# Patient Record
Sex: FEMALE | Race: WHITE | NOT HISPANIC OR LATINO | Employment: OTHER | URBAN - NONMETROPOLITAN AREA
[De-identification: names, ages, dates, MRNs, and addresses within clinical notes are randomized per-mention and may not be internally consistent; named-entity substitution may affect disease eponyms.]

---

## 2017-01-09 RX ORDER — TAMOXIFEN CITRATE 10 MG/1
10 TABLET ORAL 2 TIMES DAILY
Qty: 180 TABLET | Refills: 3 | Status: SHIPPED | OUTPATIENT
Start: 2017-01-09 | End: 2017-01-16 | Stop reason: SDUPTHER

## 2017-01-16 RX ORDER — TAMOXIFEN CITRATE 10 MG/1
10 TABLET ORAL 2 TIMES DAILY
Qty: 180 TABLET | Refills: 3 | Status: SHIPPED | OUTPATIENT
Start: 2017-01-16 | End: 2017-05-15 | Stop reason: SDUPTHER

## 2017-01-16 NOTE — TELEPHONE ENCOUNTER
Refill request from [facility] to fill tamoxifen.  Order from Dr. Copeland to fill script.

## 2017-03-05 ENCOUNTER — HOSPITAL ENCOUNTER (INPATIENT)
Facility: HOSPITAL | Age: 77
LOS: 5 days | Discharge: HOME HEALTH | End: 2017-03-10
Attending: EMERGENCY MEDICINE | Admitting: FAMILY MEDICINE

## 2017-03-05 ENCOUNTER — APPOINTMENT (OUTPATIENT)
Dept: GENERAL RADIOLOGY | Facility: HOSPITAL | Age: 77
End: 2017-03-05

## 2017-03-05 ENCOUNTER — APPOINTMENT (OUTPATIENT)
Dept: CT IMAGING | Facility: HOSPITAL | Age: 77
End: 2017-03-05

## 2017-03-05 DIAGNOSIS — N28.1 RENAL CYST: ICD-10-CM

## 2017-03-05 DIAGNOSIS — K52.9 ENTERITIS: ICD-10-CM

## 2017-03-05 DIAGNOSIS — J18.9 PNEUMONIA OF BOTH LOWER LOBES DUE TO INFECTIOUS ORGANISM: ICD-10-CM

## 2017-03-05 DIAGNOSIS — A41.9 SEPSIS, DUE TO UNSPECIFIED ORGANISM: Primary | ICD-10-CM

## 2017-03-05 DIAGNOSIS — A41.51 SEPSIS DUE TO ESCHERICHIA COLI (HCC): ICD-10-CM

## 2017-03-05 DIAGNOSIS — R74.01 TRANSAMINITIS: ICD-10-CM

## 2017-03-05 DIAGNOSIS — K52.9 ACUTE COLITIS: ICD-10-CM

## 2017-03-05 LAB
ALBUMIN SERPL-MCNC: 3.6 G/DL (ref 3.5–5)
ALBUMIN/GLOB SERPL: 1.4 G/DL (ref 1.1–2.5)
ALP SERPL-CCNC: 76 U/L (ref 24–120)
ALT SERPL W P-5'-P-CCNC: 269 U/L (ref 0–54)
AMYLASE SERPL-CCNC: 71 U/L (ref 30–110)
ANION GAP SERPL CALCULATED.3IONS-SCNC: 10 MMOL/L (ref 4–13)
ARTERIAL PATENCY WRIST A: ABNORMAL
AST SERPL-CCNC: 468 U/L (ref 7–45)
ATMOSPHERIC PRESS: ABNORMAL MMHG
BACTERIA UR QL AUTO: ABNORMAL /HPF
BASE EXCESS BLDA CALC-SCNC: -1.6 MMOL/L (ref -2–2)
BASOPHILS # BLD AUTO: 0.02 10*3/MM3 (ref 0–0.2)
BASOPHILS NFR BLD AUTO: 0.1 % (ref 0–2)
BDY SITE: ABNORMAL
BILIRUB SERPL-MCNC: 4.4 MG/DL (ref 0.1–1)
BILIRUB UR QL STRIP: ABNORMAL
BILIRUB UR QL STRIP: NEGATIVE
BUN BLD-MCNC: 20 MG/DL (ref 5–21)
BUN/CREAT SERPL: 32.3 (ref 7–25)
CALCIUM SPEC-SCNC: 9.4 MG/DL (ref 8.4–10.4)
CHLORIDE SERPL-SCNC: 99 MMOL/L (ref 98–110)
CLARITY UR: CLEAR
CLARITY UR: CLEAR
CO2 SERPL-SCNC: 25 MMOL/L (ref 24–31)
COLOR UR: ABNORMAL
COLOR UR: ABNORMAL
CREAT BLD-MCNC: 0.62 MG/DL (ref 0.5–1.4)
D-LACTATE SERPL-SCNC: 2.8 MMOL/L (ref 0.5–2)
D-LACTATE SERPL-SCNC: 3.7 MMOL/L (ref 0.5–2)
DEPRECATED RDW RBC AUTO: 41.2 FL (ref 40–54)
EOSINOPHIL # BLD AUTO: 0.01 10*3/MM3 (ref 0–0.7)
EOSINOPHIL NFR BLD AUTO: 0.1 % (ref 0–4)
ERYTHROCYTE [DISTWIDTH] IN BLOOD BY AUTOMATED COUNT: 12.2 % (ref 12–15)
GFR SERPL CREATININE-BSD FRML MDRD: 93 ML/MIN/1.73
GLOBULIN UR ELPH-MCNC: 2.6 GM/DL
GLUCOSE BLD-MCNC: 196 MG/DL (ref 70–100)
GLUCOSE UR STRIP-MCNC: ABNORMAL MG/DL
GLUCOSE UR STRIP-MCNC: NEGATIVE MG/DL
HCO3 BLDA-SCNC: 22.1 MMOL/L (ref 22–26)
HCT VFR BLD AUTO: 37.8 % (ref 37–47)
HGB BLD-MCNC: 13.4 G/DL (ref 12–16)
HGB UR QL STRIP.AUTO: NEGATIVE
HGB UR QL STRIP.AUTO: NEGATIVE
HOLD SPECIMEN: NORMAL
HOLD SPECIMEN: NORMAL
HYALINE CASTS UR QL AUTO: ABNORMAL /LPF
IMM GRANULOCYTES # BLD: 0.04 10*3/MM3 (ref 0–0.03)
IMM GRANULOCYTES NFR BLD: 0.3 % (ref 0–5)
KETONES UR QL STRIP: ABNORMAL
KETONES UR QL STRIP: NEGATIVE
LEUKOCYTE ESTERASE UR QL STRIP.AUTO: ABNORMAL
LEUKOCYTE ESTERASE UR QL STRIP.AUTO: NEGATIVE
LIPASE SERPL-CCNC: 113 U/L (ref 23–203)
LYMPHOCYTES # BLD AUTO: 0.35 10*3/MM3 (ref 0.72–4.86)
LYMPHOCYTES NFR BLD AUTO: 2.3 % (ref 15–45)
MAGNESIUM SERPL-MCNC: 1.1 MG/DL (ref 1.4–2.2)
MCH RBC QN AUTO: 32.6 PG (ref 28–32)
MCHC RBC AUTO-ENTMCNC: 35.4 G/DL (ref 33–36)
MCV RBC AUTO: 92 FL (ref 82–98)
MODALITY: ABNORMAL
MONOCYTES # BLD AUTO: 0.41 10*3/MM3 (ref 0.19–1.3)
MONOCYTES NFR BLD AUTO: 2.7 % (ref 4–12)
NEUTROPHILS # BLD AUTO: 14.57 10*3/MM3 (ref 1.87–8.4)
NEUTROPHILS NFR BLD AUTO: 94.5 % (ref 39–78)
NITRITE UR QL STRIP: NEGATIVE
NITRITE UR QL STRIP: NEGATIVE
PCO2 BLDA: 34.4 MM HG (ref 35–45)
PH BLDA: 7.42 PH UNITS (ref 7.35–7.45)
PH UR STRIP.AUTO: 6 [PH] (ref 5–8)
PH UR STRIP.AUTO: 6.5 [PH] (ref 5–8)
PLATELET # BLD AUTO: 127 10*3/MM3 (ref 130–400)
PMV BLD AUTO: 10.6 FL (ref 6–12)
PO2 BLDA: 64.4 MM HG (ref 80–100)
POTASSIUM BLD-SCNC: 3 MMOL/L (ref 3.5–5.3)
PROCALCITONIN SERPL-MCNC: 4.58 NG/ML
PROT SERPL-MCNC: 6.2 G/DL (ref 6.3–8.7)
PROT UR QL STRIP: NEGATIVE
PROT UR QL STRIP: NEGATIVE
RBC # BLD AUTO: 4.11 10*6/MM3 (ref 4.2–5.4)
RBC # UR: ABNORMAL /HPF
REF LAB TEST METHOD: ABNORMAL
SAO2 % BLDCOA: 93.2 % (ref 94–100)
SAO2 % BLDCOA: 93.2 % (ref 94–100)
SODIUM BLD-SCNC: 134 MMOL/L (ref 135–145)
SP GR UR STRIP: 1.02 (ref 1–1.03)
SP GR UR STRIP: 1.03 (ref 1–1.03)
SQUAMOUS #/AREA URNS HPF: ABNORMAL /HPF
TROPONIN I SERPL-MCNC: 0 NG/ML (ref 0–0.07)
UROBILINOGEN UR QL STRIP: ABNORMAL
UROBILINOGEN UR QL STRIP: ABNORMAL
WBC NRBC COR # BLD: 15.4 10*3/MM3 (ref 4.8–10.8)
WBC UR QL AUTO: ABNORMAL /HPF
WHOLE BLOOD HOLD SPECIMEN: NORMAL
WHOLE BLOOD HOLD SPECIMEN: NORMAL

## 2017-03-05 PROCEDURE — 71010 HC CHEST PA OR AP: CPT

## 2017-03-05 PROCEDURE — 94640 AIRWAY INHALATION TREATMENT: CPT

## 2017-03-05 PROCEDURE — 80053 COMPREHEN METABOLIC PANEL: CPT | Performed by: EMERGENCY MEDICINE

## 2017-03-05 PROCEDURE — 84484 ASSAY OF TROPONIN QUANT: CPT

## 2017-03-05 PROCEDURE — 0 IOPAMIDOL 61 % SOLUTION: Performed by: EMERGENCY MEDICINE

## 2017-03-05 PROCEDURE — 87150 DNA/RNA AMPLIFIED PROBE: CPT | Performed by: EMERGENCY MEDICINE

## 2017-03-05 PROCEDURE — 85025 COMPLETE CBC W/AUTO DIFF WBC: CPT | Performed by: EMERGENCY MEDICINE

## 2017-03-05 PROCEDURE — 87040 BLOOD CULTURE FOR BACTERIA: CPT | Performed by: EMERGENCY MEDICINE

## 2017-03-05 PROCEDURE — 87186 SC STD MICRODIL/AGAR DIL: CPT | Performed by: EMERGENCY MEDICINE

## 2017-03-05 PROCEDURE — 36600 WITHDRAWAL OF ARTERIAL BLOOD: CPT

## 2017-03-05 PROCEDURE — 81001 URINALYSIS AUTO W/SCOPE: CPT | Performed by: EMERGENCY MEDICINE

## 2017-03-05 PROCEDURE — 25010000002 ENOXAPARIN PER 10 MG: Performed by: FAMILY MEDICINE

## 2017-03-05 PROCEDURE — 93005 ELECTROCARDIOGRAM TRACING: CPT | Performed by: EMERGENCY MEDICINE

## 2017-03-05 PROCEDURE — 94760 N-INVAS EAR/PLS OXIMETRY 1: CPT

## 2017-03-05 PROCEDURE — 74177 CT ABD & PELVIS W/CONTRAST: CPT

## 2017-03-05 PROCEDURE — 83690 ASSAY OF LIPASE: CPT | Performed by: EMERGENCY MEDICINE

## 2017-03-05 PROCEDURE — 82150 ASSAY OF AMYLASE: CPT | Performed by: EMERGENCY MEDICINE

## 2017-03-05 PROCEDURE — 84145 PROCALCITONIN (PCT): CPT | Performed by: EMERGENCY MEDICINE

## 2017-03-05 PROCEDURE — 83735 ASSAY OF MAGNESIUM: CPT | Performed by: EMERGENCY MEDICINE

## 2017-03-05 PROCEDURE — 25010000002 POTASSIUM CHLORIDE PER 2 MEQ: Performed by: EMERGENCY MEDICINE

## 2017-03-05 PROCEDURE — 87077 CULTURE AEROBIC IDENTIFY: CPT | Performed by: EMERGENCY MEDICINE

## 2017-03-05 PROCEDURE — 99285 EMERGENCY DEPT VISIT HI MDM: CPT

## 2017-03-05 PROCEDURE — 81003 URINALYSIS AUTO W/O SCOPE: CPT | Performed by: EMERGENCY MEDICINE

## 2017-03-05 PROCEDURE — 94799 UNLISTED PULMONARY SVC/PX: CPT

## 2017-03-05 PROCEDURE — 25010000002 ONDANSETRON PER 1 MG: Performed by: EMERGENCY MEDICINE

## 2017-03-05 PROCEDURE — 82803 BLOOD GASES ANY COMBINATION: CPT

## 2017-03-05 PROCEDURE — P9612 CATHETERIZE FOR URINE SPEC: HCPCS

## 2017-03-05 PROCEDURE — 83605 ASSAY OF LACTIC ACID: CPT | Performed by: EMERGENCY MEDICINE

## 2017-03-05 PROCEDURE — 87086 URINE CULTURE/COLONY COUNT: CPT | Performed by: EMERGENCY MEDICINE

## 2017-03-05 PROCEDURE — 93010 ELECTROCARDIOGRAM REPORT: CPT | Performed by: INTERNAL MEDICINE

## 2017-03-05 RX ORDER — POTASSIUM CHLORIDE 14.9 MG/ML
20 INJECTION INTRAVENOUS ONCE
Status: COMPLETED | OUTPATIENT
Start: 2017-03-05 | End: 2017-03-05

## 2017-03-05 RX ORDER — ONDANSETRON 2 MG/ML
4 INJECTION INTRAMUSCULAR; INTRAVENOUS ONCE
Status: COMPLETED | OUTPATIENT
Start: 2017-03-05 | End: 2017-03-05

## 2017-03-05 RX ORDER — BUDESONIDE 0.5 MG/2ML
0.5 INHALANT ORAL
Status: DISCONTINUED | OUTPATIENT
Start: 2017-03-05 | End: 2017-03-10 | Stop reason: HOSPADM

## 2017-03-05 RX ORDER — METRONIDAZOLE 500 MG/1
500 TABLET ORAL EVERY 8 HOURS SCHEDULED
Status: DISCONTINUED | OUTPATIENT
Start: 2017-03-05 | End: 2017-03-09

## 2017-03-05 RX ORDER — SODIUM CHLORIDE 9 MG/ML
1000 INJECTION, SOLUTION INTRAVENOUS ONCE
Status: COMPLETED | OUTPATIENT
Start: 2017-03-05 | End: 2017-03-05

## 2017-03-05 RX ORDER — AZELASTINE 1 MG/ML
2 SPRAY, METERED NASAL 2 TIMES DAILY
COMMUNITY
End: 2017-11-02

## 2017-03-05 RX ORDER — MONTELUKAST SODIUM 10 MG/1
10 TABLET ORAL NIGHTLY
Status: DISCONTINUED | OUTPATIENT
Start: 2017-03-05 | End: 2017-03-10 | Stop reason: HOSPADM

## 2017-03-05 RX ORDER — FLUTICASONE PROPIONATE 50 MCG
2 SPRAY, SUSPENSION (ML) NASAL DAILY
COMMUNITY
End: 2018-07-09

## 2017-03-05 RX ORDER — FLUTICASONE PROPIONATE 50 MCG
2 SPRAY, SUSPENSION (ML) NASAL DAILY
Status: DISCONTINUED | OUTPATIENT
Start: 2017-03-05 | End: 2017-03-10 | Stop reason: HOSPADM

## 2017-03-05 RX ORDER — MONTELUKAST SODIUM 10 MG/1
10 TABLET ORAL DAILY
Status: DISCONTINUED | OUTPATIENT
Start: 2017-03-05 | End: 2017-03-05

## 2017-03-05 RX ORDER — SODIUM CHLORIDE 0.9 % (FLUSH) 0.9 %
10 SYRINGE (ML) INJECTION AS NEEDED
Status: DISCONTINUED | OUTPATIENT
Start: 2017-03-05 | End: 2017-03-10 | Stop reason: HOSPADM

## 2017-03-05 RX ORDER — ONDANSETRON 2 MG/ML
4 INJECTION INTRAMUSCULAR; INTRAVENOUS EVERY 6 HOURS PRN
Status: DISCONTINUED | OUTPATIENT
Start: 2017-03-05 | End: 2017-03-10 | Stop reason: HOSPADM

## 2017-03-05 RX ORDER — ACETAMINOPHEN 325 MG/1
650 TABLET ORAL EVERY 4 HOURS PRN
Status: DISCONTINUED | OUTPATIENT
Start: 2017-03-05 | End: 2017-03-10 | Stop reason: HOSPADM

## 2017-03-05 RX ORDER — TAMOXIFEN CITRATE 10 MG/1
10 TABLET ORAL 2 TIMES DAILY
Status: DISCONTINUED | OUTPATIENT
Start: 2017-03-05 | End: 2017-03-10 | Stop reason: HOSPADM

## 2017-03-05 RX ORDER — SODIUM CHLORIDE 0.9 % (FLUSH) 0.9 %
1-10 SYRINGE (ML) INJECTION AS NEEDED
Status: DISCONTINUED | OUTPATIENT
Start: 2017-03-05 | End: 2017-03-10 | Stop reason: HOSPADM

## 2017-03-05 RX ORDER — SODIUM CHLORIDE 9 MG/ML
100 INJECTION, SOLUTION INTRAVENOUS CONTINUOUS
Status: DISCONTINUED | OUTPATIENT
Start: 2017-03-05 | End: 2017-03-07

## 2017-03-05 RX ORDER — ASPIRIN 81 MG/1
81 TABLET ORAL DAILY
Status: DISCONTINUED | OUTPATIENT
Start: 2017-03-05 | End: 2017-03-10 | Stop reason: HOSPADM

## 2017-03-05 RX ADMIN — MONTELUKAST SODIUM 10 MG: 10 TABLET ORAL at 21:44

## 2017-03-05 RX ADMIN — ASPIRIN 81 MG: 81 TABLET ORAL at 15:39

## 2017-03-05 RX ADMIN — TAMOXIFEN CITRATE 10 MG: 10 TABLET ORAL at 13:50

## 2017-03-05 RX ADMIN — METRONIDAZOLE 500 MG: 500 TABLET ORAL at 23:13

## 2017-03-05 RX ADMIN — SODIUM CHLORIDE 1000 ML: 9 INJECTION, SOLUTION INTRAVENOUS at 09:00

## 2017-03-05 RX ADMIN — ENOXAPARIN SODIUM 30 MG: 100 INJECTION SUBCUTANEOUS at 13:49

## 2017-03-05 RX ADMIN — ERTAPENEM SODIUM 1 G: 1 INJECTION, POWDER, LYOPHILIZED, FOR SOLUTION INTRAMUSCULAR; INTRAVENOUS at 14:01

## 2017-03-05 RX ADMIN — FLUTICASONE PROPIONATE 2 SPRAY: 50 SPRAY, METERED NASAL at 13:48

## 2017-03-05 RX ADMIN — SODIUM CHLORIDE 1000 ML: 9 INJECTION, SOLUTION INTRAVENOUS at 07:16

## 2017-03-05 RX ADMIN — ACETAMINOPHEN 650 MG: 325 TABLET ORAL at 15:42

## 2017-03-05 RX ADMIN — BUDESONIDE 0.5 MG: 0.5 INHALANT RESPIRATORY (INHALATION) at 20:07

## 2017-03-05 RX ADMIN — AZTREONAM 2 G: 2 INJECTION, POWDER, FOR SOLUTION INTRAMUSCULAR; INTRAVENOUS at 09:04

## 2017-03-05 RX ADMIN — ONDANSETRON 4 MG: 2 INJECTION INTRAMUSCULAR; INTRAVENOUS at 07:16

## 2017-03-05 RX ADMIN — POTASSIUM CHLORIDE 20 MEQ: 200 INJECTION, SOLUTION INTRAVENOUS at 12:05

## 2017-03-05 RX ADMIN — SODIUM CHLORIDE 125 ML/HR: 9 INJECTION, SOLUTION INTRAVENOUS at 12:05

## 2017-03-05 RX ADMIN — IOPAMIDOL 100 ML: 612 INJECTION, SOLUTION INTRAVENOUS at 07:51

## 2017-03-05 RX ADMIN — VANCOMYCIN HYDROCHLORIDE 1000 MG: 1 INJECTION, POWDER, LYOPHILIZED, FOR SOLUTION INTRAVENOUS at 10:15

## 2017-03-05 NOTE — ED PROVIDER NOTES
Subjective   Patient is a 76 y.o. female presenting with abdominal pain.   Abdominal Pain   Pain location:  Epigastric  Pain quality: aching, fullness and pressure    Pain radiates to:  Does not radiate  Pain severity:  Moderate  Onset quality:  Gradual  Timing:  Constant  Progression:  Worsening  Chronicity:  New  Context: not alcohol use, not awakening from sleep, not diet changes, not eating, not medication withdrawal, not previous surgeries, not recent illness, not recent travel, not retching, not sick contacts and not trauma    Relieved by:  Nothing  Worsened by:  Nothing  Ineffective treatments:  None tried  Associated symptoms: anorexia, belching, diarrhea, nausea and vomiting    Associated symptoms: no chest pain, no chills, no constipation, no cough, no fever, no flatus, no hematemesis, no hematochezia, no hematuria, no melena, no sore throat and no vaginal bleeding    Risk factors: no alcohol abuse, has not had multiple surgeries, no NSAID use and not obese        Review of Systems   Constitutional: Negative.  Negative for chills and fever.   HENT: Negative.  Negative for sore throat.    Eyes: Negative.    Respiratory: Negative.  Negative for cough.    Cardiovascular: Negative.  Negative for chest pain.   Gastrointestinal: Positive for abdominal pain, anorexia, diarrhea, nausea and vomiting. Negative for constipation, flatus, hematemesis, hematochezia and melena.   Endocrine: Negative.    Genitourinary: Negative.  Negative for hematuria and vaginal bleeding.   Skin: Negative.    Neurological: Negative.    Hematological: Negative.    All other systems reviewed and are negative.      Past Medical History   Diagnosis Date   • Anemia, unspecified    • Bone/cartilage disorder    • Bronchitis    • Chronic obstructive asthma with status asthmaticus    • History of bone density study      DR. 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 breast      RIGHT SIDED BREAST CANCER  WITH LUMPECTOMY   • Osteoporosis    • Pure hypercholesterolemia    • Type 2 diabetes mellitus without complications        Allergies   Allergen Reactions   • Septra [Sulfamethoxazole-Trimethoprim] Hives   • Keflex [Cephalexin] Hives and GI Intolerance   • Ceftin [Cefuroxime] Hives and GI Intolerance   • Ciprofloxacin Hives and Rash       Past Surgical History   Procedure Laterality Date   • Breast biopsy Right 11/11/2013   • Breast lumpectomy Right 12/03/2013   • Breast biopsy Right 1972      with Benign findings   • Cholecystectomy     • Tubal abdominal ligation     • Skin biopsy Right 11/16/2013     FOOT AND LEG; BENIGN FINDINGS   • Foot surgery  09/2014      Dr. Sherri Saha in [facility]   • Colonoscopy  2010      Dr. Gardiner. facility used [facility]   • Mammo bilateral  08/19/2014   • Pap smear     • Breast lumpectomy       RIGHT       Family History   Problem Relation Age of Onset   • Stroke Brother    • No Known Problems Daughter    • No Known Problems Son    • Other Brother      hx of many comorbidities   • Down syndrome Brother    • Stroke Mother    • Pneumonia Father    • Stroke Father        Social History     Social History   • Marital status:      Spouse name: N/A   • Number of children: N/A   • Years of education: N/A     Social History Main Topics   • Smoking status: Never Smoker   • Smokeless tobacco: None   • Alcohol use No   • Drug use: No   • Sexual activity: Not Asked     Other Topics Concern   • None     Social History Narrative   • None           Objective   Physical Exam   Constitutional: She is oriented to person, place, and time. She appears well-developed and well-nourished.  Non-toxic appearance.   HENT:   Head: Normocephalic and atraumatic.   Mouth/Throat: Oropharynx is clear and moist.   Eyes: Conjunctivae are normal. Pupils are equal, round, and reactive to light.   Neck: Normal range of motion. Neck supple. No hepatojugular reflux and no JVD present.   Cardiovascular:  Normal rate, regular rhythm, normal heart sounds and intact distal pulses.  PMI is not displaced.  Exam reveals no decreased pulses.    No murmur heard.  Pulmonary/Chest: Effort normal and breath sounds normal. No accessory muscle usage. No apnea. No respiratory distress. She has no decreased breath sounds. She has no wheezes.   Abdominal: Normal appearance, normal aorta and bowel sounds are normal. She exhibits no shifting dullness, no distension, no fluid wave, no abdominal bruit, no ascites, no pulsatile midline mass and no mass. There is no tenderness. There is no guarding.   Musculoskeletal: Normal range of motion.   Neurological: She is alert and oriented to person, place, and time. She has normal strength and normal reflexes. No cranial nerve deficit. GCS eye subscore is 4. GCS verbal subscore is 5. GCS motor subscore is 6.   Skin: Skin is warm and dry.   Psychiatric: She has a normal mood and affect. Her behavior is normal.   Nursing note and vitals reviewed.      Procedures         ED Course  ED Course   Value Comment By Time    TACHYCARDIA Robert Guthrie MD 03/05 0826   Glucose: (!) 196 (Reviewed) Robert Guthrie MD 03/05 0925   ALT (SGPT): (!) 269 (Reviewed) Robert Guthrie MD 03/05 0925   AST (SGOT): (!) 468 (Reviewed) Robert Guthrie MD 03/05 0925   WBC: (!) 15.40 (Reviewed) Robert Guthrie MD 03/05 0925   Color, UA: (!) Dark Yellow (Reviewed) Robert Guthrie MD 03/05 0925   pCO2, Arterial: (!) 34.4 (Reviewed) Robert Guthrie MD 03/05 0925   pO2, Arterial: (!) 64.4 (Reviewed) Robert Guthrie MD 03/05 0926    Patient is a do not discomfort and shortness of breath lactic acid 3.7 was given fluid boluses IV antibiotics and Robert Guthrie MD 03/05 0927    mild small bowel wall thickening is not excluded. Mild enteritis  considered. Correlate with patient presentation. No dilated bowel loops  or evidence of obstruction or definite acute bowel pathology otherwise.  2. Right nephrogenic cyst.  This report has been reviewed  with this patient as well as admitting physician and they need to follow up on the nephrogenic cyst has been conveyed Robert Guthrie MD 03/05 0927    I think the patient has got pneumonia will be admitted to the hospital hospitalist Dr. Valles informed Robert Guthrie MD 03/05 0928                  [facility]      Final diagnoses:   Sepsis, due to unspecified organism   Pneumonia of both lower lobes due to infectious organism   Transaminitis   Renal cyst   Enteritis            Robert Guthrie MD  03/05/17 3041

## 2017-03-05 NOTE — H&P
[facility]  HISTORY AND PHYSICAL    Date of Admission: 3/5/2017  Primary Care Physician: Lorenzo Vogel MD    Subjective     Chief Complaint: abd pain, diarrhea, vomiting    History of Present Illness  She is a 76-year-old white female who states that she had a sudden onset of mid epigastric discomfort that began the evening prior to presentation.  She states a few hours after that she began having diarrhea and had about 3 episodes of diarrhea and 1 episode of vomiting followed by dry heaving.  Abdominal discomfort began to improve and she had no further episodes of diarrhea however she developed chills and rigors.  His is what prompted her to present to the ER.  She had no sick contacts.  She did not have any new food intake and did not go out to eat.  She has not had recent antibiotic use.  She denied symptoms of shortness of breath, cough, chest pain.  She states that she did have some mild bilateral lower rib discomfort the evening prior.  Currently she has no abdominal discomfort and has tolerated clear liquids in her hospital room.  In the ER patient had a CT scan with IV contrast however no oral contrast commented on difficult to tell but possibly a mild enteritis.  She had a chest x-ray commented on mild bilateral opacities concerning for atelectasis versus pneumonia.  She had an elevated white count and elevated lactic acid and mildly low blood pressure therefore admission was requested for concern of sepsis possibly related to pneumonia.        Review of Systems     Otherwise complete ROS reviewed and negative except as mentioned in the HPI.      Past Medical History:   Past Medical History   Diagnosis Date   • Anemia, unspecified    • Bone/cartilage disorder    • Bronchitis    • Chronic obstructive asthma with status asthmaticus    • History of bone density study      DR. 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ri [place] [facility] [place]   • Colonoscopy  2010      Dr. Gardiner. facility used [facility]   • Mammo bilateral  08/19/2014   • Pap smear     • Breast lumpectomy       RIGHT       Social History:  reports that she has never smoked. She does not have any smokeless tobacco history on file. She reports that she does not drink alcohol or use illicit drugs.    Family History: family history includes Down syndrome in her brother; No Known Problems in her daughter and son; Other in her brother; Pneumonia in her father; Stroke in her brother, father, and mother.       Allergies:  Allergies   Allergen Reactions   • Septra [Sulfamethoxazole-Trimethoprim] Hives   • Keflex [Cephalexin] Hives and GI Intolerance   • Ceftin [Cefuroxime] Hives and GI Intolerance   • Ciprofloxacin Hives and Rash       Medications:  Prior to Admission medications    Medication Sig Start Date End Date Taking? Authorizing Provider   aspirin 81 MG EC tablet Take 81 mg by mouth Daily.   Yes Historical Provider, MD   azelastine (ASTELIN) 0.1 % nasal spray 2 sprays into each nostril 2 (Two) Times a Day. Use in each nostril as directed   Yes Historical Provider, MD   beclomethasone (QVAR) 80 MCG/ACT inhaler Inhale 1 puff 2 (Two) Times a Day.   Yes Historical Provider, MD   esomeprazole (nexIUM) 40 MG capsule Take 40 mg by mouth Daily.   Yes Historical Provider, MD   fluticasone (FLONASE) 50 MCG/ACT nasal spray 2 sprays into each nostril  "Daily.   Yes Historical Provider, MD   levocetirizine (XYZAL) 5 MG tablet Take 5 mg by mouth Daily.   Yes Historical Provider, MD   metFORMIN (GLUCOPHAGE) 500 MG tablet Take 500 mg by mouth 2 (Two) Times a Day.   Yes Historical Provider, MD   montelukast (SINGULAIR) 10 MG tablet Take 10 mg by mouth Daily.   Yes Historical Provider, MD   rosuvastatin (CRESTOR) 10 MG tablet Take 10 mg by mouth Daily.   Yes Historical Provider, MD   tamoxifen (NOLVADEX) 10 MG tablet Take 1 tablet by mouth 2 (Two) Times a Day. 1/16/17  Yes Robert Copeland MD   triamterene-hydrochlorothiazide (DYAZIDE) 37.5-25 MG per capsule Take 1 capsule by mouth Daily.   Yes Historical Provider, MD   Azelastine-Fluticasone (DYMISTA) 137-50 MCG/ACT suspension into each nostril. as directed  3/5/17  Historical Provider, MD   budesonide (PULMICORT) 180 MCG/ACT inhaler as directed  3/5/17  Historical Provider, MD   ibandronate (BONIVA) 150 MG tablet as directed  3/5/17  Historical Provider, MD       Objective     Vital Signs:   Visit Vitals   • /52 (BP Location: Left arm, Patient Position: Lying)   • Pulse 91   • Temp 98.1 °F (36.7 °C) (Oral)   • Resp 18   • Ht 63\" (160 cm)   • Wt 149 lb (67.6 kg)   • SpO2 97%   • BMI 26.39 kg/m2     Physical Exam   Constitutional: She is oriented to person, place, and time. She appears well-developed and well-nourished.   HENT:   Head: Normocephalic and atraumatic.   Eyes: Conjunctivae and EOM are normal. Pupils are equal, round, and reactive to light.   Neck: Neck supple. No JVD present. No thyromegaly present.   Cardiovascular: Normal rate, regular rhythm, normal heart sounds and intact distal pulses.  Exam reveals no gallop and no friction rub.    No murmur heard.  Pulmonary/Chest: Effort normal and breath sounds normal. No respiratory distress. She has no wheezes. She has no rales. She exhibits no tenderness.   Abdominal: Soft. Bowel sounds are normal. She exhibits no distension. There is tenderness (enderness " with palpation more so on the left). There is no rebound and no guarding.   Musculoskeletal: Normal range of motion. She exhibits no edema, tenderness or deformity.   Lymphadenopathy:     She has no cervical adenopathy.   Neurological: She is alert and oriented to person, place, and time. She displays normal reflexes. No cranial nerve deficit. She exhibits normal muscle tone.   Skin: Skin is warm and dry. No rash noted.   Psychiatric: She has a normal mood and affect. Her behavior is normal. Judgment and thought content normal.           Results Reviewed:  Lab Results (last 24 hours)     Procedure Component Value Units Date/Time    Comprehensive Metabolic Panel [[id number]]  (Abnormal) Collected:  03/05/17 0635    Specimen:  Blood from Arm, Left Updated:  03/05/17 0713     Glucose 196 (H) mg/dL      BUN 20 mg/dL      Creatinine 0.62 mg/dL      Sodium 134 (L) mmol/L      Potassium 3.0 (L) mmol/L      Chloride 99 mmol/L      CO2 25.0 mmol/L      Calcium 9.4 mg/dL      Total Protein 6.2 (L) g/dL      Albumin 3.60 g/dL      ALT (SGPT) 269 (H) U/L      AST (SGOT) 468 (H) U/L      Alkaline Phosphatase 76 U/L      Total Bilirubin 4.4 (H) mg/dL      eGFR Non African Amer 93 mL/min/1.73      Globulin 2.6 gm/dL      A/G Ratio 1.4 g/dL      BUN/Creatinine Ratio 32.3 (H)      Anion Gap 10.0 mmol/L     Narrative:       The MDRD GFR formula is only valid for adults with stable renal function between ages 18 and 70.    Lipase [[id number]]  (Normal) Collected:  03/05/17 0635    Specimen:  Blood from Arm, Left Updated:  03/05/17 0713     Lipase 113 U/L     Amylase [[id number]]  (Normal) Collected:  03/05/17 0635    Specimen:  Blood from Arm, Left Updated:  03/05/17 0713     Amylase 71 U/L     POC Troponin, Rapid [[id number]]  (Normal) Collected:  03/05/17 0714    Specimen:  Blood Updated:  03/05/17 0726     Troponin I 0.00 ng/mL       Serial Number: [id number]    : [id number]       Lactic Acid, Plasma [[id number]]  (Abnormal) Collected:   03/05/17 0708    Specimen:  Blood Updated:  03/05/17 0726     Lactate 3.7 (C) mmol/L     CBC & Differential [[id number]] Collected:  03/05/17 0635    Specimen:  Blood Updated:  03/05/17 0845    Narrative:       The following orders were created for panel order CBC & Differential.  Procedure                               Abnormality         Status                     ---------                               -----------         ------                     CBC Auto Differential[[id number]]         Abnormal            Final result                 Please view results for these tests on the individual orders.    CBC Auto Differential [[id number]]  (Abnormal) Collected:  03/05/17 0635    Specimen:  Blood from Arm, Left Updated:  03/05/17 0845     WBC 15.40 (H) 10*3/mm3      RBC 4.11 (L) 10*6/mm3      Hemoglobin 13.4 g/dL      Hematocrit 37.8 %      MCV 92.0 fL      MCH 32.6 (H) pg      MCHC 35.4 g/dL      RDW 12.2 %      RDW-SD 41.2 fl      MPV 10.6 fL      Platelets 127 (L) 10*3/mm3      Neutrophil % 94.5 (H) %      Lymphocyte % 2.3 (L) %      Monocyte % 2.7 (L) %      Eosinophil % 0.1 %      Basophil % 0.1 %      Immature Grans % 0.3 %      Neutrophils, Absolute 14.57 (H) 10*3/mm3      Lymphocytes, Absolute 0.35 (L) 10*3/mm3      Monocytes, Absolute 0.41 10*3/mm3      Eosinophils, Absolute 0.01 10*3/mm3      Basophils, Absolute 0.02 10*3/mm3      Immature Grans, Absolute 0.04 (H) 10*3/mm3     Magnesium [[id number]]  (Abnormal) Collected:  03/05/17 0635    Specimen:  Blood from Arm, Left Updated:  03/05/17 0847     Magnesium 1.1 (L) mg/dL     Blood Gas, Arterial [[id number]]  (Abnormal) Collected:  03/05/17 0851    Specimen:  Arterial Blood Updated:  03/05/17 0853     Site Arterial: left brachial      Ronnie's Test --       Documented in Rapid Comm        pH, Arterial 7.425 pH units      pCO2, Arterial 34.4 (L) mm Hg      pO2, Arterial 64.4 (L) mm Hg      HCO3, Arterial 22.1 mmol/L      Base Excess, Arterial -1.6 mmol/L      O2  Saturation, Arterial 93.2 (L) %      O2 Saturation Calculated 93.2 (L) %      Barometric Pressure for Blood Gas -- mmHg       Component not reported at this site.        Modality Room air     Narrative:       Serial Number: [id number]    : [id number]    Urinalysis, Microscopic Only [[id number]]  (Abnormal) Collected:  03/05/17 0751    Specimen:  Urine from Urine, Clean Catch Updated:  03/05/17 0914     RBC, UA None Seen /HPF      WBC, UA 3-5 (A) /HPF      Bacteria, UA None Seen /HPF      Squamous Epithelial Cells, UA 0-2 /HPF      Hyaline Casts, UA None Seen /LPF      Methodology Automated Microscopy     Urinalysis With / Culture If Indicated [[id number]]  (Abnormal) Collected:  03/05/17 0751    Specimen:  Urine from Urine, Clean Catch Updated:  03/05/17 0914     Color, UA Dark Yellow (A)      Appearance, UA Clear      pH, UA 6.0      Specific Gravity, UA 1.016      Glucose,  mg/dL (Trace) (A)      Ketones, UA Trace (A)      Bilirubin, UA Small (1+) (A)      Blood, UA Negative      Protein, UA Negative      Leuk Esterase, UA Trace (A)      Nitrite, UA Negative      Urobilinogen, UA 1.0 E.U./dL     Procalcitonin [[id number]]  (Abnormal) Collected:  03/05/17 0635    Specimen:  Blood from Arm, Left Updated:  03/05/17 0916     Procalcitonin 4.58 (H) ng/mL     Narrative:       SIRS, sepsis, severe sepsis, and septic shock are categorized according to the criteria of the consensus conference of the American College of Chest Physicians/Society of Critical Care Medicine.    PCT < 0.5 ng/mL     Systemic infection (sepsis) is not likely.    PCT >0.5 and < 2.0 ng/mL Systemic infection (sepsis) is possible, but other conditions are known to elevate PCT as well.    PCT > 2.0 ng/mL     Systemic infection (sepsis) is likely, unless other causes are known.      PCT > 10.0 ng/mL    Important systemic inflammatory response, almost exclusively due to severe bacterial sepsis or septic shock.    PCT values of < 0.5 ng/mL do not exclude  an infection, because localized infections (without systemic signs) may be associated with such low concentrations, or a systemic infection in its initial stages (<6 hours).  Increased PCT can occur without infection.  PCT concentrations between 0.5 and 2.0 ng/mL should be interpreted taking into account the patients history.  It is recommended to retest PCT within 6-24 hours if any concentrations < 2.0 ng/mL are obtained.    Blood Culture [[id number]] Collected:  03/05/17 0854    Specimen:  Blood from Arm, Left Updated:  03/05/17 1003    Blood Culture [[id number]] Collected:  03/05/17 0859    Specimen:  Blood from Arm, Left Updated:  03/05/17 1004    Urinalysis With / Culture If Indicated [[id number]]  (Abnormal) Collected:  03/05/17 1000    Specimen:  Urine from Urine, Catheter Updated:  03/05/17 1013     Color, UA Dark Yellow (A)      Appearance, UA Clear      pH, UA 6.5      Specific Gravity, UA 1.027      Glucose, UA Negative      Ketones, UA Negative      Bilirubin, UA Negative      Blood, UA Negative      Protein, UA Negative      Leuk Esterase, UA Negative      Nitrite, UA Negative      Urobilinogen, UA 1.0 E.U./dL     Narrative:       Urine microscopic not indicated.    Urine Culture [[id number]] Collected:  03/05/17 1000    Specimen:  Urine from Urine, Catheter Updated:  03/05/17 1055    Green Top (Gel) [[id number]] Collected:  03/05/17 0635    Specimen:  Blood from Arm, Left Updated:  03/05/17 1101     Extra Tube Hold for add-ons.       Auto resulted.       Red Top [[id number]] Collected:  03/05/17 0635    Specimen:  Blood from Arm, Left Updated:  03/05/17 1101     Extra Tube Hold for add-ons.       Auto resulted.       Pender Draw [[id number]] Collected:  03/05/17 0635    Specimen:  Blood Updated:  03/05/17 1101    Narrative:       The following orders were created for panel order Pender Draw.  Procedure                               Abnormality         Status                     ---------                                -----------         ------                     Light Blue Top[[id number]]                                    Final result               Green Top (Gel)[[id number]]                                   Final result               Lavender Top[[id number]]                                      Final result               Red Top[[id number]]                                           Final result               Green Top (No Gel)[[id number]]                                                             Please view results for these tests on the individual orders.    Light Blue Top [[id number]] Collected:  03/05/17 0635    Specimen:  Blood from Arm, Left Updated:  03/05/17 1101     Extra Tube hold for add-on       Auto resulted       Lavender Top [[id number]] Collected:  03/05/17 0635    Specimen:  Blood from Arm, Left Updated:  03/05/17 1101     Extra Tube hold for add-on       Auto resulted       Lactate Acid, Reflex [[id number]]  (Abnormal) Collected:  03/05/17 1106    Specimen:  Blood Updated:  03/05/17 1143     Lactate 2.8 (C) mmol/L         Imaging Results (last 24 hours)     Procedure Component Value Units Date/Time    CT Abdomen Pelvis With Contrast [[id number]] Collected:  03/05/17 0813     Updated:  03/05/17 1258    Narrative:       HISTORY: Abdominal pain. History of breast cancer.     CT ABDOMEN PELVIS W CONTRAST-  CT evaluation of the abdomen and pelvis was performed with intravenous  contrast. Oral contrast was not ingested. 5 mm sequential transaxial  images were obtained. 2-D sagittal and coronal reconstructions images  were generated.     Calcified granuloma noted in the left lung base anteriorly. Left  hemidiaphragm elevation appreciated with associated atelectasis/chronic  changes, scar. Mild probable linear scarring also noted in the right  lung base.     The gallbladder is surgically absent. Biliary tree is minimally  prominent, suspect mild reservoir effect post cholecystectomy. There are  no focal hepatic lesions with  calcified hepatic granuloma observed. The  hepatic venous and portal venous structures are imaged normally.     Calcified granuloma identified in the spleen.     There are no adrenal masses. No pancreatic lesions observed.     A pedunculated 3 cm cyst arising from the interpolar region of the right  kidney is observed. There are no urinary tract calculi or evidence of  obstruction. The urinary bladder is minimally distended without focal  bladder wall abnormality.     The GYN structures are appreciated without adnexal masses.     There is stool and gas identified in the colon which is not dilated.  There is no CT evidence of significant diverticular disease.     Evaluation of the bowel without enteric contrast is suboptimal.  Particularly, the small bowel is difficult to assess for possible small  bowel wall thickening. Particularly, small bowel loops in the left upper  quadrant are difficult to assess for mild circumferential wall  thickening.     Diverticulum along the proximal duodenal sweep suspected. The stomach is  not distended.     There is no ascites or pneumoperitoneum.     There are no pathologically enlarged lymph nodes.     The bony structures are intact. Spondylosis appreciated in the  thoracolumbar spine with disc degeneration.     These findings were discussed with Dr. Robert Guthrie, emergency room  physician, at the time of dictation.       Impression:       1. Suboptimal evaluation of the bowel without enteric contrast. Mild  small bowel wall thickening is not excluded. Mild enteritis considered.  Correlate with patient presentation. No dilated bowel loops or evidence  of obstruction or definite acute bowel pathology otherwise.  2. Right nephrogenic cyst.  This report was finalized on 03/05/2017 12:56 by Dr. Oni Barrera MD.    XR Chest 1 View [[id number]] Collected:  03/05/17 0739     Updated:  03/05/17 1305    Narrative:       CHEST, ONE VIEW:     HISTORY: Abdominal pain     COMPARISON: 11/27/2013  and 11/9/2012     A single frontal chest radiograph was obtained.     FINDINGS:     The level of inspiration is shallow and lung volumes diminished.     There is mild interstitial and vague increased airspace opacities in the  lower lobes. Atelectasis and/or infiltrate considered. Correlate with  patient presentation.     The upper lung fields are clear.     The heart is normal in size without evidence of heart failure.     Degenerative spurring noted in the thoracic spine.                                  Impression:       Shallow inspiration with diminished lung volumes with interstitial and  mild airspace opacities in the lung bases. Differential considerations  include atelectasis and/or infiltrate.     This report was finalized on 03/05/2017 13:03 by Dr. Oni Barrera MD.          I have personally reviewed and interpreted the radiology studies and ECG obtained at time of admission.     Assessment / Plan     Assessment & Plan  Hospital Problem List     Sepsis        1. Sepsis felt to more related to a colitis or enteritis rather than pneumonia.  Patient is been given adequate fluid resuscitation at this point.  We will keep on a low-dose IV maintenance rate.  Will continue to cover with ertapenem has multiple allergies.  Will add Flagyl to her regimen.  More lactic acid until normal.  Follow up labs in the morning.  Follow up final blood cultures.    2.  Colitis versus enteritis continue ertapenem.  Add Flagyl.  Check GI panel.  Clear liquid diet today and hope to advance tomorrow    3.  chest x-ray suggesting atelectasis versus pneumonia.  Patient with no symptoms at this time to suggest pneumonia.  We will check a PA lateral chest x-ray in the morning.    4 .  mildly low blood pressure with a history of hypertension.  Continue to hold home medications at this time      Code Status: full     I discussed the patients findings and my recommendations with pt and   Estimated length of stay 1-2    Erna  Chidi Ramos MD   03/05/17   1:39 PM

## 2017-03-05 NOTE — PLAN OF CARE
Problem: Patient Care Overview (Adult)  Goal: Plan of Care Review  Outcome: Ongoing (interventions implemented as appropriate)  Admitted from ER with symptoms of abdominal pain which pt denies at this time.  Nausea and Vomitting none present since arrival to ER.  Lung sounds WNL but chest xray shows pneumonia.  Pt denies cough or SOA.  Antibiotics ordered continue to monitor for fever.    Problem: Pneumonia (Adult)  Goal: Signs and Symptoms of Listed Potential Problems Will be Absent or Manageable (Pneumonia)  Outcome: Ongoing (interventions implemented as appropriate)    Problem: Sepsis (Adult)  Goal: Signs and Symptoms of Listed Potential Problems Will be Absent or Manageable (Sepsis)  Outcome: Ongoing (interventions implemented as appropriate)

## 2017-03-06 ENCOUNTER — APPOINTMENT (OUTPATIENT)
Dept: GENERAL RADIOLOGY | Facility: HOSPITAL | Age: 77
End: 2017-03-06

## 2017-03-06 LAB
ALBUMIN SERPL-MCNC: 2.7 G/DL (ref 3.5–5)
ALBUMIN/GLOB SERPL: 1.1 G/DL (ref 1.1–2.5)
ALP SERPL-CCNC: 56 U/L (ref 24–120)
ALT SERPL W P-5'-P-CCNC: 170 U/L (ref 0–54)
ANION GAP SERPL CALCULATED.3IONS-SCNC: 7 MMOL/L (ref 4–13)
AST SERPL-CCNC: 135 U/L (ref 7–45)
BASOPHILS # BLD AUTO: 0.02 10*3/MM3 (ref 0–0.2)
BASOPHILS NFR BLD AUTO: 0.1 % (ref 0–2)
BILIRUB SERPL-MCNC: 3.1 MG/DL (ref 0.1–1)
BUN BLD-MCNC: 10 MG/DL (ref 5–21)
BUN/CREAT SERPL: 18.9 (ref 7–25)
CALCIUM SPEC-SCNC: 7.7 MG/DL (ref 8.4–10.4)
CHLORIDE SERPL-SCNC: 108 MMOL/L (ref 98–110)
CO2 SERPL-SCNC: 24 MMOL/L (ref 24–31)
CREAT BLD-MCNC: 0.53 MG/DL (ref 0.5–1.4)
D-LACTATE SERPL-SCNC: 0.7 MMOL/L (ref 0.5–2)
DEPRECATED RDW RBC AUTO: 43.2 FL (ref 40–54)
EOSINOPHIL # BLD AUTO: 0.07 10*3/MM3 (ref 0–0.7)
EOSINOPHIL NFR BLD AUTO: 0.4 % (ref 0–4)
ERYTHROCYTE [DISTWIDTH] IN BLOOD BY AUTOMATED COUNT: 12.9 % (ref 12–15)
GFR SERPL CREATININE-BSD FRML MDRD: 112 ML/MIN/1.73
GLOBULIN UR ELPH-MCNC: 2.4 GM/DL
GLUCOSE BLD-MCNC: 77 MG/DL (ref 70–100)
HCT VFR BLD AUTO: 31.2 % (ref 37–47)
HGB BLD-MCNC: 11.1 G/DL (ref 12–16)
IMM GRANULOCYTES # BLD: 0.06 10*3/MM3 (ref 0–0.03)
IMM GRANULOCYTES NFR BLD: 0.4 % (ref 0–5)
LYMPHOCYTES # BLD AUTO: 0.92 10*3/MM3 (ref 0.72–4.86)
LYMPHOCYTES NFR BLD AUTO: 5.7 % (ref 15–45)
MCH RBC QN AUTO: 32.8 PG (ref 28–32)
MCHC RBC AUTO-ENTMCNC: 35.6 G/DL (ref 33–36)
MCV RBC AUTO: 92.3 FL (ref 82–98)
MONOCYTES # BLD AUTO: 0.73 10*3/MM3 (ref 0.19–1.3)
MONOCYTES NFR BLD AUTO: 4.5 % (ref 4–12)
NEUTROPHILS # BLD AUTO: 14.44 10*3/MM3 (ref 1.87–8.4)
NEUTROPHILS NFR BLD AUTO: 88.9 % (ref 39–78)
PLATELET # BLD AUTO: 100 10*3/MM3 (ref 130–400)
PMV BLD AUTO: 10.3 FL (ref 6–12)
POTASSIUM BLD-SCNC: 2.9 MMOL/L (ref 3.5–5.3)
PROT SERPL-MCNC: 5.1 G/DL (ref 6.3–8.7)
RBC # BLD AUTO: 3.38 10*6/MM3 (ref 4.2–5.4)
SODIUM BLD-SCNC: 139 MMOL/L (ref 135–145)
WBC NRBC COR # BLD: 16.24 10*3/MM3 (ref 4.8–10.8)

## 2017-03-06 PROCEDURE — 25010000002 ONDANSETRON PER 1 MG: Performed by: FAMILY MEDICINE

## 2017-03-06 PROCEDURE — 80053 COMPREHEN METABOLIC PANEL: CPT | Performed by: FAMILY MEDICINE

## 2017-03-06 PROCEDURE — 83605 ASSAY OF LACTIC ACID: CPT | Performed by: FAMILY MEDICINE

## 2017-03-06 PROCEDURE — 94799 UNLISTED PULMONARY SVC/PX: CPT

## 2017-03-06 PROCEDURE — 25010000002 ENOXAPARIN PER 10 MG: Performed by: FAMILY MEDICINE

## 2017-03-06 PROCEDURE — 85025 COMPLETE CBC W/AUTO DIFF WBC: CPT | Performed by: FAMILY MEDICINE

## 2017-03-06 PROCEDURE — 71020 HC CHEST PA AND LATERAL: CPT

## 2017-03-06 RX ORDER — POTASSIUM CHLORIDE 750 MG/1
40 CAPSULE, EXTENDED RELEASE ORAL 2 TIMES DAILY
Status: COMPLETED | OUTPATIENT
Start: 2017-03-06 | End: 2017-03-06

## 2017-03-06 RX ADMIN — ENOXAPARIN SODIUM 30 MG: 100 INJECTION SUBCUTANEOUS at 12:30

## 2017-03-06 RX ADMIN — METRONIDAZOLE 500 MG: 500 TABLET ORAL at 21:34

## 2017-03-06 RX ADMIN — FLUTICASONE PROPIONATE 2 SPRAY: 50 SPRAY, METERED NASAL at 09:28

## 2017-03-06 RX ADMIN — METRONIDAZOLE 500 MG: 500 TABLET ORAL at 13:57

## 2017-03-06 RX ADMIN — SODIUM CHLORIDE 100 ML/HR: 9 INJECTION, SOLUTION INTRAVENOUS at 11:05

## 2017-03-06 RX ADMIN — BUDESONIDE 0.5 MG: 0.5 INHALANT RESPIRATORY (INHALATION) at 20:31

## 2017-03-06 RX ADMIN — METRONIDAZOLE 500 MG: 500 TABLET ORAL at 08:08

## 2017-03-06 RX ADMIN — SODIUM CHLORIDE 100 ML/HR: 9 INJECTION, SOLUTION INTRAVENOUS at 21:37

## 2017-03-06 RX ADMIN — TAMOXIFEN CITRATE 10 MG: 10 TABLET ORAL at 20:40

## 2017-03-06 RX ADMIN — ONDANSETRON HYDROCHLORIDE 4 MG: 2 SOLUTION INTRAMUSCULAR; INTRAVENOUS at 18:59

## 2017-03-06 RX ADMIN — ASPIRIN 81 MG: 81 TABLET ORAL at 08:08

## 2017-03-06 RX ADMIN — POTASSIUM CHLORIDE 40 MEQ: 750 CAPSULE, EXTENDED RELEASE ORAL at 20:39

## 2017-03-06 RX ADMIN — TAMOXIFEN CITRATE 10 MG: 10 TABLET ORAL at 08:09

## 2017-03-06 RX ADMIN — POTASSIUM CHLORIDE 40 MEQ: 750 CAPSULE, EXTENDED RELEASE ORAL at 08:09

## 2017-03-06 RX ADMIN — MONTELUKAST SODIUM 10 MG: 10 TABLET ORAL at 20:40

## 2017-03-06 RX ADMIN — ERTAPENEM SODIUM 1 G: 1 INJECTION, POWDER, LYOPHILIZED, FOR SOLUTION INTRAMUSCULAR; INTRAVENOUS at 13:57

## 2017-03-06 RX ADMIN — BUDESONIDE 0.5 MG: 0.5 INHALANT RESPIRATORY (INHALATION) at 07:38

## 2017-03-06 NOTE — PLAN OF CARE
Problem: Patient Care Overview (Adult)  Goal: Plan of Care Review  Outcome: Ongoing (interventions implemented as appropriate)    03/06/17 0651   Coping/Psychosocial Response Interventions   Plan Of Care Reviewed With patient   Patient Care Overview   Progress progress toward functional goals as expected

## 2017-03-06 NOTE — PROGRESS NOTES
[facility] Medicine Services  INPATIENT PROGRESS NOTE    Length of Stay: 1  Date of Admission: 3/5/2017  Primary Care Physician: Lorenzo Vogel MD    Subjective   Chief Complaint: no more abd pain or diarrhea  HPI   She feeling much better.  She denies any further abdominal pain or diarrhea.  She denies shortness of breath or chest pain.  She has tolerated a clear liquid diet all day and would like to be advanced.  ambulating to the bathroom and back to her bed.  She and her  had many questions.        Review of Systems     All pertinent negatives and positives are as above. All other systems have been reviewed and are negative unless otherwise stated.     Objective    Temp:  [96.7 °F (35.9 °C)-98.4 °F (36.9 °C)] 98.4 °F (36.9 °C)  Heart Rate:  [] 81  Resp:  [18] 18  BP: (115-138)/(49-59) 126/51  Physical Exam   Constitutional: She is oriented to person, place, and time. She appears well-developed and well-nourished.   HENT:   Head: Normocephalic and atraumatic.   Eyes: Conjunctivae and EOM are normal. Pupils are equal, round, and reactive to light.   Neck: Neck supple. No JVD present. No thyromegaly present.   Cardiovascular: Normal rate, regular rhythm, normal heart sounds and intact distal pulses.  Exam reveals no gallop and no friction rub.    No murmur heard.  Pulmonary/Chest: Effort normal and breath sounds normal. No respiratory distress. She has no wheezes. She has no rales. She exhibits no tenderness.   Abdominal: Soft. Bowel sounds are normal. She exhibits no distension. There is no tenderness. There is no rebound and no guarding.   Musculoskeletal: Normal range of motion. She exhibits no edema, tenderness or deformity.   Lymphadenopathy:     She has no cervical adenopathy.   Neurological: She is alert and oriented to person, place, and time. She displays normal reflexes. No cranial nerve deficit. She exhibits normal muscle tone.   Skin: Skin is warm and  dry. No rash noted.   Psychiatric: She has a normal mood and affect. Her behavior is normal. Judgment and thought content normal.           Results Review:  I have reviewed the labs, radiology results, and diagnostic studies.    Laboratory Data:     Results from last 7 days  Lab Units 03/06/17  0657 03/05/17  0635   WBC 10*3/mm3 16.24* 15.40*   HEMOGLOBIN g/dL 11.1* 13.4   HEMATOCRIT % 31.2* 37.8   PLATELETS 10*3/mm3 100* 127*          Results from last 7 days  Lab Units 03/06/17  0657 03/05/17  0635   SODIUM mmol/L 139 134*   POTASSIUM mmol/L 2.9* 3.0*   CHLORIDE mmol/L 108 99   TOTAL CO2 mmol/L 24.0 25.0   BUN mg/dL 10 20   CREATININE mg/dL 0.53 0.62   CALCIUM mg/dL 7.7* 9.4   BILIRUBIN mg/dL 3.1* 4.4*   ALK PHOS U/L 56 76   ALT (SGPT) U/L 170* 269*   AST (SGOT) U/L 135* 468*   GLUCOSE mg/dL 77 196*       Culture Data:   BLOOD CULTURE   Date Value Ref Range Status   03/05/2017 No growth at less than 24 hours  Preliminary   03/05/2017 No growth at less than 24 hours  Preliminary     URINE CULTURE   Date Value Ref Range Status   03/05/2017 No growth at 24 hours  Preliminary       Radiology Data:   Imaging Results (last 24 hours)     Procedure Component Value Units Date/Time    CT Abdomen Pelvis With Contrast [[id number]] Collected:  03/05/17 0813     Updated:  03/05/17 1258    Narrative:       HISTORY: Abdominal pain. History of breast cancer.     CT ABDOMEN PELVIS W CONTRAST-  CT evaluation of the abdomen and pelvis was performed with intravenous  contrast. Oral contrast was not ingested. 5 mm sequential transaxial  images were obtained. 2-D sagittal and coronal reconstructions images  were generated.     Calcified granuloma noted in the left lung base anteriorly. Left  hemidiaphragm elevation appreciated with associated atelectasis/chronic  changes, scar. Mild probable linear scarring also noted in the right  lung base.     The gallbladder is surgically absent. Biliary tree is minimally  prominent, suspect mild  reservoir effect post cholecystectomy. There are  no focal hepatic lesions with calcified hepatic granuloma observed. The  hepatic venous and portal venous structures are imaged normally.     Calcified granuloma identified in the spleen.     There are no adrenal masses. No pancreatic lesions observed.     A pedunculated 3 cm cyst arising from the interpolar region of the right  kidney is observed. There are no urinary tract calculi or evidence of  obstruction. The urinary bladder is minimally distended without focal  bladder wall abnormality.     The GYN structures are appreciated without adnexal masses.     There is stool and gas identified in the colon which is not dilated.  There is no CT evidence of significant diverticular disease.     Evaluation of the bowel without enteric contrast is suboptimal.  Particularly, the small bowel is difficult to assess for possible small  bowel wall thickening. Particularly, small bowel loops in the left upper  quadrant are difficult to assess for mild circumferential wall  thickening.     Diverticulum along the proximal duodenal sweep suspected. The stomach is  not distended.     There is no ascites or pneumoperitoneum.     There are no pathologically enlarged lymph nodes.     The bony structures are intact. Spondylosis appreciated in the  thoracolumbar spine with disc degeneration.     These findings were discussed with Dr. Robert Guthrie, emergency room  physician, at the time of dictation.       Impression:       1. Suboptimal evaluation of the bowel without enteric contrast. Mild  small bowel wall thickening is not excluded. Mild enteritis considered.  Correlate with patient presentation. No dilated bowel loops or evidence  of obstruction or definite acute bowel pathology otherwise.  2. Right nephrogenic cyst.  This report was finalized on 03/05/2017 12:56 by Dr. Oni Barrera MD.    XR Chest 1 View [[id number]] Collected:  03/05/17 0739     Updated:  03/05/17 1303     Narrative:       CHEST, ONE VIEW:     HISTORY: Abdominal pain     COMPARISON: 11/27/2013 and 11/9/2012     A single frontal chest radiograph was obtained.     FINDINGS:     The level of inspiration is shallow and lung volumes diminished.     There is mild interstitial and vague increased airspace opacities in the  lower lobes. Atelectasis and/or infiltrate considered. Correlate with  patient presentation.     The upper lung fields are clear.     The heart is normal in size without evidence of heart failure.     Degenerative spurring noted in the thoracic spine.                                  Impression:       Shallow inspiration with diminished lung volumes with interstitial and  mild airspace opacities in the lung bases. Differential considerations  include atelectasis and/or infiltrate.     This report was finalized on 03/05/2017 13:03 by Dr. Oni Barrera MD.          I have reviewed the patient current medications.     Assessment/Plan     Hospital Problem List     Sepsis        1. Sepsis-will d/c fluids. Improving however bp still borderline. White count increased today despite abx. Follow up cbc and procalcitonin in am. Cont abx. Consider repeat CT abd if still elevated in am  2. Colitis-cont abx. GI panel pending b/c pt with no bm. Family asking about a GI consult. Consider in am if not going home  3. N/v/d-resolved  4. Leukocytosis-likely related to abd issues  5. Hypokalemia-replace  6. DM2-stable  7. Hx HTN-hold fluids. Hold home meds. Monitor.              Discharge Planning: I expect patient to be discharged to home tomorrow    Erna Ramos MD   03/06/17   7:57 AM

## 2017-03-06 NOTE — PLAN OF CARE
Problem: Patient Care Overview (Adult)  Goal: Plan of Care Review  Outcome: Ongoing (interventions implemented as appropriate)    03/06/17 1541   Coping/Psychosocial Response Interventions   Plan Of Care Reviewed With patient   Patient Care Overview   Progress improving   Outcome Evaluation   Outcome Summary/Follow up Plan Await cultures and lab work in the AM. IV and PO antibiotics given. Tolerated. Continue to monitor.         Problem: Pneumonia (Adult)  Goal: Signs and Symptoms of Listed Potential Problems Will be Absent or Manageable (Pneumonia)  Outcome: Ongoing (interventions implemented as appropriate)    Problem: Sepsis (Adult)  Goal: Signs and Symptoms of Listed Potential Problems Will be Absent or Manageable (Sepsis)  Outcome: Ongoing (interventions implemented as appropriate)

## 2017-03-07 LAB
ANION GAP SERPL CALCULATED.3IONS-SCNC: 5 MMOL/L (ref 4–13)
BACTERIA BLD CULT: ABNORMAL
BACTERIA SPEC AEROBE CULT: ABNORMAL
BASOPHILS # BLD AUTO: 0.02 10*3/MM3 (ref 0–0.2)
BASOPHILS NFR BLD AUTO: 0.2 % (ref 0–2)
BUN BLD-MCNC: 8 MG/DL (ref 5–21)
BUN/CREAT SERPL: 15.7 (ref 7–25)
CALCIUM SPEC-SCNC: 7.6 MG/DL (ref 8.4–10.4)
CHLORIDE SERPL-SCNC: 110 MMOL/L (ref 98–110)
CO2 SERPL-SCNC: 24 MMOL/L (ref 24–31)
CREAT BLD-MCNC: 0.51 MG/DL (ref 0.5–1.4)
DEPRECATED RDW RBC AUTO: 43.3 FL (ref 40–54)
EOSINOPHIL # BLD AUTO: 0.16 10*3/MM3 (ref 0–0.7)
EOSINOPHIL NFR BLD AUTO: 1.6 % (ref 0–4)
ERYTHROCYTE [DISTWIDTH] IN BLOOD BY AUTOMATED COUNT: 12.8 % (ref 12–15)
GFR SERPL CREATININE-BSD FRML MDRD: 117 ML/MIN/1.73
GLUCOSE BLD-MCNC: 99 MG/DL (ref 70–100)
HCT VFR BLD AUTO: 31.6 % (ref 37–47)
HGB BLD-MCNC: 11.1 G/DL (ref 12–16)
IMM GRANULOCYTES # BLD: 0.03 10*3/MM3 (ref 0–0.03)
IMM GRANULOCYTES NFR BLD: 0.3 % (ref 0–5)
LYMPHOCYTES # BLD AUTO: 0.87 10*3/MM3 (ref 0.72–4.86)
LYMPHOCYTES NFR BLD AUTO: 8.8 % (ref 15–45)
MCH RBC QN AUTO: 32.6 PG (ref 28–32)
MCHC RBC AUTO-ENTMCNC: 35.1 G/DL (ref 33–36)
MCV RBC AUTO: 92.7 FL (ref 82–98)
MONOCYTES # BLD AUTO: 0.41 10*3/MM3 (ref 0.19–1.3)
MONOCYTES NFR BLD AUTO: 4.1 % (ref 4–12)
NEUTROPHILS # BLD AUTO: 8.41 10*3/MM3 (ref 1.87–8.4)
NEUTROPHILS NFR BLD AUTO: 85 % (ref 39–78)
PLATELET # BLD AUTO: 94 10*3/MM3 (ref 130–400)
PMV BLD AUTO: 10.5 FL (ref 6–12)
POTASSIUM BLD-SCNC: 4 MMOL/L (ref 3.5–5.3)
PROCALCITONIN SERPL-MCNC: 6.76 NG/ML
RBC # BLD AUTO: 3.41 10*6/MM3 (ref 4.2–5.4)
SODIUM BLD-SCNC: 139 MMOL/L (ref 135–145)
WBC NRBC COR # BLD: 9.9 10*3/MM3 (ref 4.8–10.8)

## 2017-03-07 PROCEDURE — 84145 PROCALCITONIN (PCT): CPT | Performed by: FAMILY MEDICINE

## 2017-03-07 PROCEDURE — 94799 UNLISTED PULMONARY SVC/PX: CPT

## 2017-03-07 PROCEDURE — 94760 N-INVAS EAR/PLS OXIMETRY 1: CPT

## 2017-03-07 PROCEDURE — 80048 BASIC METABOLIC PNL TOTAL CA: CPT | Performed by: FAMILY MEDICINE

## 2017-03-07 PROCEDURE — 25010000002 ENOXAPARIN PER 10 MG: Performed by: FAMILY MEDICINE

## 2017-03-07 PROCEDURE — 85025 COMPLETE CBC W/AUTO DIFF WBC: CPT | Performed by: FAMILY MEDICINE

## 2017-03-07 PROCEDURE — 25010000002 ONDANSETRON PER 1 MG: Performed by: FAMILY MEDICINE

## 2017-03-07 PROCEDURE — 25010000002 MAGNESIUM SULFATE 2 GM/50ML SOLUTION: Performed by: FAMILY MEDICINE

## 2017-03-07 RX ORDER — METRONIDAZOLE 500 MG/1
500 TABLET ORAL EVERY 8 HOURS SCHEDULED
Qty: 21 TABLET | Refills: 0 | Status: SHIPPED | OUTPATIENT
Start: 2017-03-07 | End: 2017-03-10 | Stop reason: HOSPADM

## 2017-03-07 RX ORDER — MAGNESIUM SULFATE HEPTAHYDRATE 40 MG/ML
2 INJECTION, SOLUTION INTRAVENOUS AS NEEDED
Status: DISCONTINUED | OUTPATIENT
Start: 2017-03-07 | End: 2017-03-10 | Stop reason: HOSPADM

## 2017-03-07 RX ORDER — PROMETHAZINE HYDROCHLORIDE 12.5 MG/1
12.5 TABLET ORAL EVERY 6 HOURS PRN
Qty: 15 TABLET | Refills: 0 | Status: SHIPPED | OUTPATIENT
Start: 2017-03-07 | End: 2017-03-10 | Stop reason: HOSPADM

## 2017-03-07 RX ADMIN — METRONIDAZOLE 500 MG: 500 TABLET ORAL at 22:13

## 2017-03-07 RX ADMIN — MONTELUKAST SODIUM 10 MG: 10 TABLET ORAL at 22:13

## 2017-03-07 RX ADMIN — METRONIDAZOLE 500 MG: 500 TABLET ORAL at 15:02

## 2017-03-07 RX ADMIN — ENOXAPARIN SODIUM 30 MG: 100 INJECTION SUBCUTANEOUS at 12:11

## 2017-03-07 RX ADMIN — TAMOXIFEN CITRATE 10 MG: 10 TABLET ORAL at 19:00

## 2017-03-07 RX ADMIN — METRONIDAZOLE 500 MG: 500 TABLET ORAL at 06:36

## 2017-03-07 RX ADMIN — ERTAPENEM SODIUM 1 G: 1 INJECTION, POWDER, LYOPHILIZED, FOR SOLUTION INTRAMUSCULAR; INTRAVENOUS at 15:02

## 2017-03-07 RX ADMIN — MAGNESIUM SULFATE HEPTAHYDRATE 2 G: 40 INJECTION, SOLUTION INTRAVENOUS at 12:50

## 2017-03-07 RX ADMIN — ONDANSETRON HYDROCHLORIDE 4 MG: 2 SOLUTION INTRAMUSCULAR; INTRAVENOUS at 18:36

## 2017-03-07 RX ADMIN — ASPIRIN 81 MG: 81 TABLET ORAL at 08:51

## 2017-03-07 RX ADMIN — TAMOXIFEN CITRATE 10 MG: 10 TABLET ORAL at 08:51

## 2017-03-07 RX ADMIN — FLUTICASONE PROPIONATE 2 SPRAY: 50 SPRAY, METERED NASAL at 08:52

## 2017-03-07 RX ADMIN — BUDESONIDE 0.5 MG: 0.5 INHALANT RESPIRATORY (INHALATION) at 20:20

## 2017-03-07 RX ADMIN — BUDESONIDE 0.5 MG: 0.5 INHALANT RESPIRATORY (INHALATION) at 08:18

## 2017-03-07 NOTE — PROGRESS NOTES
[facility] Medicine Services  INPATIENT PROGRESS NOTE    Length of Stay: 2  Date of Admission: 3/5/2017  Primary Care Physician: Lorenzo Vogel MD    Subjective   Chief Complaint: no more abd pain or diarrhea  HPI   She feeling much better.  She denies any further abdominal pain or diarrhea.  She denies shortness of breath or chest pain.  She has tolerated a clear liquid however when we advanced her to a full diet yesterday she had an episode of nausea.  This has since resolved.  She has been ambulating well.  She has been off fluid since this morning her blood pressure has been stable.  Plan was to discharge her however in the process of doing so my Dorian called with a reported positive blood culture which was greater than 48 hours after drawn.  We will await further identification.        Review of Systems     All pertinent negatives and positives are as above. All other systems have been reviewed and are negative unless otherwise stated.     Objective    Temp:  [97.6 °F (36.4 °C)-98.7 °F (37.1 °C)] 98.1 °F (36.7 °C)  Heart Rate:  [67-87] 74  Resp:  [16-20] 16  BP: (133-165)/(60-77) 165/77  Physical Exam   Constitutional: She is oriented to person, place, and time. She appears well-developed and well-nourished.   HENT:   Head: Normocephalic and atraumatic.   Eyes: Conjunctivae and EOM are normal. Pupils are equal, round, and reactive to light.   Neck: Neck supple. No JVD present. No thyromegaly present.   Cardiovascular: Normal rate, regular rhythm, normal heart sounds and intact distal pulses.  Exam reveals no gallop and no friction rub.    No murmur heard.  Pulmonary/Chest: Effort normal and breath sounds normal. No respiratory distress. She has no wheezes. She has no rales. She exhibits no tenderness.   Abdominal: Soft. Bowel sounds are normal. She exhibits no distension. There is no tenderness. There is no rebound and no guarding.   Musculoskeletal: Normal range of  motion. She exhibits no edema, tenderness or deformity.   Lymphadenopathy:     She has no cervical adenopathy.   Neurological: She is alert and oriented to person, place, and time. She displays normal reflexes. No cranial nerve deficit. She exhibits normal muscle tone.   Skin: Skin is warm and dry. No rash noted.   Psychiatric: She has a normal mood and affect. Her behavior is normal. Judgment and thought content normal.           Results Review:  I have reviewed the labs, radiology results, and diagnostic studies.    Laboratory Data:     Results from last 7 days  Lab Units 03/07/17 0535 03/06/17 0657 03/05/17 0635   WBC 10*3/mm3 9.90 16.24* 15.40*   HEMOGLOBIN g/dL 11.1* 11.1* 13.4   HEMATOCRIT % 31.6* 31.2* 37.8   PLATELETS 10*3/mm3 94* 100* 127*          Results from last 7 days  Lab Units 03/07/17 0535 03/06/17 0657 03/05/17 0635   SODIUM mmol/L 139 139 134*   POTASSIUM mmol/L 4.0 2.9* 3.0*   CHLORIDE mmol/L 110 108 99   TOTAL CO2 mmol/L 24.0 24.0 25.0   BUN mg/dL 8 10 20   CREATININE mg/dL 0.51 0.53 0.62   CALCIUM mg/dL 7.6* 7.7* 9.4   BILIRUBIN mg/dL  --  3.1* 4.4*   ALK PHOS U/L  --  56 76   ALT (SGPT) U/L  --  170* 269*   AST (SGOT) U/L  --  135* 468*   GLUCOSE mg/dL 99 77 196*       Culture Data:   BLOOD CULTURE   Date Value Ref Range Status   03/05/2017 No growth at less than 24 hours  Preliminary   03/05/2017 No growth at less than 24 hours  Preliminary     URINE CULTURE   Date Value Ref Range Status   03/05/2017 No growth at 24 hours  Preliminary       Radiology Data:   Imaging Results (last 24 hours)     Procedure Component Value Units Date/Time    CT Abdomen Pelvis With Contrast [[id number]] Collected:  03/05/17 0813     Updated:  03/05/17 1258    Narrative:       HISTORY: Abdominal pain. History of breast cancer.     CT ABDOMEN PELVIS W CONTRAST-  CT evaluation of the abdomen and pelvis was performed with intravenous  contrast. Oral contrast was not ingested. 5 mm sequential transaxial  images  were obtained. 2-D sagittal and coronal reconstructions images  were generated.     Calcified granuloma noted in the left lung base anteriorly. Left  hemidiaphragm elevation appreciated with associated atelectasis/chronic  changes, scar. Mild probable linear scarring also noted in the right  lung base.     The gallbladder is surgically absent. Biliary tree is minimally  prominent, suspect mild reservoir effect post cholecystectomy. There are  no focal hepatic lesions with calcified hepatic granuloma observed. The  hepatic venous and portal venous structures are imaged normally.     Calcified granuloma identified in the spleen.     There are no adrenal masses. No pancreatic lesions observed.     A pedunculated 3 cm cyst arising from the interpolar region of the right  kidney is observed. There are no urinary tract calculi or evidence of  obstruction. The urinary bladder is minimally distended without focal  bladder wall abnormality.     The GYN structures are appreciated without adnexal masses.     There is stool and gas identified in the colon which is not dilated.  There is no CT evidence of significant diverticular disease.     Evaluation of the bowel without enteric contrast is suboptimal.  Particularly, the small bowel is difficult to assess for possible small  bowel wall thickening. Particularly, small bowel loops in the left upper  quadrant are difficult to assess for mild circumferential wall  thickening.     Diverticulum along the proximal duodenal sweep suspected. The stomach is  not distended.     There is no ascites or pneumoperitoneum.     There are no pathologically enlarged lymph nodes.     The bony structures are intact. Spondylosis appreciated in the  thoracolumbar spine with disc degeneration.     These findings were discussed with Dr. Robert Guthrie, emergency room  physician, at the time of dictation.       Impression:       1. Suboptimal evaluation of the bowel without enteric contrast. Mild  small  bowel wall thickening is not excluded. Mild enteritis considered.  Correlate with patient presentation. No dilated bowel loops or evidence  of obstruction or definite acute bowel pathology otherwise.  2. Right nephrogenic cyst.  This report was finalized on 03/05/2017 12:56 by Dr. Oni Barrera MD.    XR Chest 1 View [[id number]] Collected:  03/05/17 0739     Updated:  03/05/17 1305    Narrative:       CHEST, ONE VIEW:     HISTORY: Abdominal pain     COMPARISON: 11/27/2013 and 11/9/2012     A single frontal chest radiograph was obtained.     FINDINGS:     The level of inspiration is shallow and lung volumes diminished.     There is mild interstitial and vague increased airspace opacities in the  lower lobes. Atelectasis and/or infiltrate considered. Correlate with  patient presentation.     The upper lung fields are clear.     The heart is normal in size without evidence of heart failure.     Degenerative spurring noted in the thoracic spine.                                  Impression:       Shallow inspiration with diminished lung volumes with interstitial and  mild airspace opacities in the lung bases. Differential considerations  include atelectasis and/or infiltrate.     This report was finalized on 03/05/2017 13:03 by Dr. Oni Barrera MD.          I have reviewed the patient current medications.     Assessment/Plan     Hospital Problem List     Sepsis        1. Sepsis-white count normal. bp better. Pt much improved  2. Colitis-cont abx. GI panel pending b/c pt with no bm. Family asking about a GI consult. Consider following up as outpt to discuss need for scope or not  3. N/v/d-resolved  4. Leukocytosis-likely related to abd issues  5. Hypokalemia-replace  6. DM2-stable  7. Hx HTN-hold fluids. Hold home meds. Monitor.  8. hypomag-replace  9. Bacteremia-Ecoli per PCR. On invanz. Will ask sw to see in regards to possible need for IV outpt abx due to multiple abx allergies.                Discharge Planning: I  expect patient to be discharged to home in 1-2 days    Erna Ramos MD   03/07/17   5:14 PM

## 2017-03-07 NOTE — PLAN OF CARE
Problem: Patient Care Overview (Adult)  Goal: Plan of Care Review  Outcome: Ongoing (interventions implemented as appropriate)    03/07/17 1717   Coping/Psychosocial Response Interventions   Plan Of Care Reviewed With patient   Patient Care Overview   Progress improving   Outcome Evaluation   Outcome Summary/Follow up Plan no c/o pain. blood cultures resulted positive for E. Coli 1 out of 4 bottles. pt was planning on d/c today but awaiting MD decision on iv abx or not.       Goal: Adult Individualization and Mutuality  Outcome: Ongoing (interventions implemented as appropriate)  Goal: Discharge Needs Assessment  Outcome: Ongoing (interventions implemented as appropriate)    Problem: Pneumonia (Adult)  Goal: Signs and Symptoms of Listed Potential Problems Will be Absent or Manageable (Pneumonia)  Outcome: Ongoing (interventions implemented as appropriate)    Problem: Sepsis (Adult)  Goal: Signs and Symptoms of Listed Potential Problems Will be Absent or Manageable (Sepsis)  Outcome: Ongoing (interventions implemented as appropriate)    03/07/17 1717   Sepsis   Problems Assessed (Sepsis) all   Problems Present (Sepsis) situational response

## 2017-03-08 PROCEDURE — 25010000002 ONDANSETRON PER 1 MG: Performed by: FAMILY MEDICINE

## 2017-03-08 RX ORDER — POLYETHYLENE GLYCOL 3350 17 G/17G
17 POWDER, FOR SOLUTION ORAL DAILY
Status: DISCONTINUED | OUTPATIENT
Start: 2017-03-08 | End: 2017-03-10 | Stop reason: HOSPADM

## 2017-03-08 RX ORDER — TRIAMTERENE AND HYDROCHLOROTHIAZIDE 37.5; 25 MG/1; MG/1
1 TABLET ORAL DAILY
Status: DISCONTINUED | OUTPATIENT
Start: 2017-03-08 | End: 2017-03-10 | Stop reason: HOSPADM

## 2017-03-08 RX ADMIN — ERTAPENEM SODIUM 1 G: 1 INJECTION, POWDER, LYOPHILIZED, FOR SOLUTION INTRAMUSCULAR; INTRAVENOUS at 14:03

## 2017-03-08 RX ADMIN — MONTELUKAST SODIUM 10 MG: 10 TABLET ORAL at 22:10

## 2017-03-08 RX ADMIN — TAMOXIFEN CITRATE 10 MG: 10 TABLET ORAL at 09:39

## 2017-03-08 RX ADMIN — POLYETHYLENE GLYCOL (3350) 17 G: 17 POWDER, FOR SOLUTION ORAL at 19:00

## 2017-03-08 RX ADMIN — FLUTICASONE PROPIONATE 2 SPRAY: 50 SPRAY, METERED NASAL at 09:40

## 2017-03-08 RX ADMIN — METRONIDAZOLE 500 MG: 500 TABLET ORAL at 15:59

## 2017-03-08 RX ADMIN — ASPIRIN 81 MG: 81 TABLET ORAL at 09:40

## 2017-03-08 RX ADMIN — TAMOXIFEN CITRATE 10 MG: 10 TABLET ORAL at 19:00

## 2017-03-08 RX ADMIN — METRONIDAZOLE 500 MG: 500 TABLET ORAL at 06:46

## 2017-03-08 RX ADMIN — METRONIDAZOLE 500 MG: 500 TABLET ORAL at 22:10

## 2017-03-08 RX ADMIN — TRIAMTERENE AND HYDROCHLOROTHIAZIDE 1 TABLET: 37.5; 25 TABLET ORAL at 19:01

## 2017-03-08 RX ADMIN — ONDANSETRON HYDROCHLORIDE 4 MG: 2 SOLUTION INTRAMUSCULAR; INTRAVENOUS at 21:12

## 2017-03-08 NOTE — PROGRESS NOTES
[facility] Medicine Services  INPATIENT PROGRESS NOTE    Length of Stay: 3  Date of Admission: 3/5/2017  Primary Care Physician: Lorenzo Vogel MD    Subjective   Chief Complaint: no more abd pain or diarrhea  HPI   She feeling much better.  He does state that she has not been able have a bowel movement since arrival.  She also states that if she finishes her meal she has some mild abdominal cramping.  She states that she has some increased lower extremity swelling that has improved since yesterday.        Review of Systems     All pertinent negatives and positives are as above. All other systems have been reviewed and are negative unless otherwise stated.     Objective    Temp:  [97.4 °F (36.3 °C)-98.4 °F (36.9 °C)] 97.9 °F (36.6 °C)  Heart Rate:  [66-81] 67  Resp:  [16-20] 16  BP: (146-172)/(65-82) 172/70  Physical Exam   Constitutional: She is oriented to person, place, and time. She appears well-developed and well-nourished.   HENT:   Head: Normocephalic and atraumatic.   Eyes: Conjunctivae and EOM are normal. Pupils are equal, round, and reactive to light.   Neck: Neck supple. No JVD present. No thyromegaly present.   Cardiovascular: Normal rate, regular rhythm, normal heart sounds and intact distal pulses.  Exam reveals no gallop and no friction rub.    No murmur heard.  Pulmonary/Chest: Effort normal and breath sounds normal. No respiratory distress. She has no wheezes. She has no rales. She exhibits no tenderness.   Abdominal: Soft. Bowel sounds are normal. She exhibits no distension. There is no tenderness. There is no rebound and no guarding.   Musculoskeletal: Normal range of motion. She exhibits edema (trace). She exhibits no tenderness or deformity.   Lymphadenopathy:     She has no cervical adenopathy.   Neurological: She is alert and oriented to person, place, and time. She displays normal reflexes. No cranial nerve deficit. She exhibits normal muscle tone.    Skin: Skin is warm and dry. No rash noted.   Psychiatric: She has a normal mood and affect. Her behavior is normal. Judgment and thought content normal.           Results Review:  I have reviewed the labs, radiology results, and diagnostic studies.    Laboratory Data:     Results from last 7 days  Lab Units 03/07/17  0535 03/06/17 0657 03/05/17 0635   WBC 10*3/mm3 9.90 16.24* 15.40*   HEMOGLOBIN g/dL 11.1* 11.1* 13.4   HEMATOCRIT % 31.6* 31.2* 37.8   PLATELETS 10*3/mm3 94* 100* 127*          Results from last 7 days  Lab Units 03/07/17  0535 03/06/17  0657 03/05/17  0635   SODIUM mmol/L 139 139 134*   POTASSIUM mmol/L 4.0 2.9* 3.0*   CHLORIDE mmol/L 110 108 99   TOTAL CO2 mmol/L 24.0 24.0 25.0   BUN mg/dL 8 10 20   CREATININE mg/dL 0.51 0.53 0.62   CALCIUM mg/dL 7.6* 7.7* 9.4   BILIRUBIN mg/dL  --  3.1* 4.4*   ALK PHOS U/L  --  56 76   ALT (SGPT) U/L  --  170* 269*   AST (SGOT) U/L  --  135* 468*   GLUCOSE mg/dL 99 77 196*       Culture Data:   BLOOD CULTURE   Date Value Ref Range Status   03/05/2017 No growth at less than 24 hours  Preliminary   03/05/2017 No growth at less than 24 hours  Preliminary     URINE CULTURE   Date Value Ref Range Status   03/05/2017 No growth at 24 hours  Preliminary       Radiology Data:   Imaging Results (last 24 hours)     Procedure Component Value Units Date/Time    CT Abdomen Pelvis With Contrast [[id number]] Collected:  03/05/17 0813     Updated:  03/05/17 1258    Narrative:       HISTORY: Abdominal pain. History of breast cancer.     CT ABDOMEN PELVIS W CONTRAST-  CT evaluation of the abdomen and pelvis was performed with intravenous  contrast. Oral contrast was not ingested. 5 mm sequential transaxial  images were obtained. 2-D sagittal and coronal reconstructions images  were generated.     Calcified granuloma noted in the left lung base anteriorly. Left  hemidiaphragm elevation appreciated with associated atelectasis/chronic  changes, scar. Mild probable linear scarring also  noted in the right  lung base.     The gallbladder is surgically absent. Biliary tree is minimally  prominent, suspect mild reservoir effect post cholecystectomy. There are  no focal hepatic lesions with calcified hepatic granuloma observed. The  hepatic venous and portal venous structures are imaged normally.     Calcified granuloma identified in the spleen.     There are no adrenal masses. No pancreatic lesions observed.     A pedunculated 3 cm cyst arising from the interpolar region of the right  kidney is observed. There are no urinary tract calculi or evidence of  obstruction. The urinary bladder is minimally distended without focal  bladder wall abnormality.     The GYN structures are appreciated without adnexal masses.     There is stool and gas identified in the colon which is not dilated.  There is no CT evidence of significant diverticular disease.     Evaluation of the bowel without enteric contrast is suboptimal.  Particularly, the small bowel is difficult to assess for possible small  bowel wall thickening. Particularly, small bowel loops in the left upper  quadrant are difficult to assess for mild circumferential wall  thickening.     Diverticulum along the proximal duodenal sweep suspected. The stomach is  not distended.     There is no ascites or pneumoperitoneum.     There are no pathologically enlarged lymph nodes.     The bony structures are intact. Spondylosis appreciated in the  thoracolumbar spine with disc degeneration.     These findings were discussed with Dr. Robert Guthrie, emergency room  physician, at the time of dictation.       Impression:       1. Suboptimal evaluation of the bowel without enteric contrast. Mild  small bowel wall thickening is not excluded. Mild enteritis considered.  Correlate with patient presentation. No dilated bowel loops or evidence  of obstruction or definite acute bowel pathology otherwise.  2. Right nephrogenic cyst.  This report was finalized on 03/05/2017  12:56 by Dr. Oni Barrera MD.    XR Chest 1 View [[id number]] Collected:  03/05/17 0739     Updated:  03/05/17 1305    Narrative:       CHEST, ONE VIEW:     HISTORY: Abdominal pain     COMPARISON: 11/27/2013 and 11/9/2012     A single frontal chest radiograph was obtained.     FINDINGS:     The level of inspiration is shallow and lung volumes diminished.     There is mild interstitial and vague increased airspace opacities in the  lower lobes. Atelectasis and/or infiltrate considered. Correlate with  patient presentation.     The upper lung fields are clear.     The heart is normal in size without evidence of heart failure.     Degenerative spurring noted in the thoracic spine.                                  Impression:       Shallow inspiration with diminished lung volumes with interstitial and  mild airspace opacities in the lung bases. Differential considerations  include atelectasis and/or infiltrate.     This report was finalized on 03/05/2017 13:03 by Dr. Oni Barrera MD.          I have reviewed the patient current medications.     Assessment/Plan     Hospital Problem List     Sepsis        1. Sepsis-white count normal. bp better. Pt much improved  2. Colitis-cont abx. GI panel pending b/c pt with no bm. Family asking about a GI consult. Consider following up as outpt to discuss need for scope or not  3. N/v/d-resolved  4. Leukocytosis-likely related to abd issues  5. Hypokalemia-replace  6. DM2-stable  7. Hx HTN-hold fluids. Hold home meds. Monitor.  8. hypomag-replace  9. Bacteremia-Ecoli per PCR. On invanz. Will ask sw to see in regards to possible need for IV outpt abx due to multiple abx allergies. Sensitivities still pending. Awaiting these before i ask for a picc in case there is an oral agent that would work.                 Discharge Planning: I expect patient to be discharged to home in 1-2 days    Erna Ramos MD   03/08/17   4:14 PM

## 2017-03-08 NOTE — PROGRESS NOTES
Discharge Planning Assessment  [facility]     Patient Name: Suzanna Jolly  MRN: [id number]  Today's Date: 3/8/2017    Admit Date: 3/5/2017          Discharge Needs Assessment       03/08/17 1110    Living Environment    Lives With spouse    Living Arrangements house    Provides Primary Care For no one    Quality Of Family Relationships supportive    Able to Return to Prior Living Arrangements yes    Discharge Needs Assessment    Concerns To Be Addressed other (see comments)    Concerns Comments Possible need for outpt IV abx.      Equipment Currently Used at Home cane, quad;walker, rolling;commode    Equipment Needed After Discharge none    Discharge Facility/Level Of Care Needs home with home health    Transportation Available car            Discharge Plan       03/08/17 1113    Case Management/Social Work Plan    Plan Possible HH for IV abx.      Additional Comments Spoke with pt's spouse, pt in bathroom, to assess for home needs.  Pt is getting around fine, no need for further DME.  She will only need HH if IV abx needed at discharge. Waiting to see if this is needed/ordered.  Provided options of providers in their area, they prefer [facility].        Discharge Placement     No information found        Expected Discharge Date and Time     Expected Discharge Date Expected Discharge Time    Mar 7, 2017               Demographic Summary     None            Functional Status     None            Psychosocial     None            Abuse/Neglect     None            Legal     None            Substance Abuse     None            Patient Forms     None          JENNIFER Clifford

## 2017-03-08 NOTE — PLAN OF CARE
Problem: Patient Care Overview (Adult)  Goal: Plan of Care Review  Outcome: Ongoing (interventions implemented as appropriate)    03/08/17 0335   Coping/Psychosocial Response Interventions   Plan Of Care Reviewed With patient   Patient Care Overview   Progress improving   Outcome Evaluation   Outcome Summary/Follow up Plan Afebrile, no SS of infection, cont Flagyl. Up ad annabella, safety maintained.          Problem: Pneumonia (Adult)  Goal: Signs and Symptoms of Listed Potential Problems Will be Absent or Manageable (Pneumonia)  Outcome: Ongoing (interventions implemented as appropriate)    Problem: Sepsis (Adult)  Goal: Signs and Symptoms of Listed Potential Problems Will be Absent or Manageable (Sepsis)  Outcome: Ongoing (interventions implemented as appropriate)

## 2017-03-08 NOTE — PLAN OF CARE
Problem: Patient Care Overview (Adult)  Goal: Plan of Care Review  Outcome: Ongoing (interventions implemented as appropriate)    03/08/17 1605   Coping/Psychosocial Response Interventions   Plan Of Care Reviewed With patient;spouse   Patient Care Overview   Progress improving   Outcome Evaluation   Outcome Summary/Follow up Plan No complaints of pain. LS clear, no SOA noted. Continue IV antibiotics, waiting on blood culture sensitivities.       Goal: Adult Individualization and Mutuality  Outcome: Ongoing (interventions implemented as appropriate)  Goal: Discharge Needs Assessment  Outcome: Ongoing (interventions implemented as appropriate)    Problem: Pneumonia (Adult)  Goal: Signs and Symptoms of Listed Potential Problems Will be Absent or Manageable (Pneumonia)  Outcome: Ongoing (interventions implemented as appropriate)    Problem: Sepsis (Adult)  Goal: Signs and Symptoms of Listed Potential Problems Will be Absent or Manageable (Sepsis)  Outcome: Ongoing (interventions implemented as appropriate)

## 2017-03-09 PROCEDURE — 94799 UNLISTED PULMONARY SVC/PX: CPT

## 2017-03-09 PROCEDURE — 94760 N-INVAS EAR/PLS OXIMETRY 1: CPT

## 2017-03-09 RX ADMIN — BUDESONIDE 0.5 MG: 0.5 INHALANT RESPIRATORY (INHALATION) at 09:07

## 2017-03-09 RX ADMIN — BUDESONIDE 0.5 MG: 0.5 INHALANT RESPIRATORY (INHALATION) at 22:11

## 2017-03-09 RX ADMIN — FLUTICASONE PROPIONATE 2 SPRAY: 50 SPRAY, METERED NASAL at 08:04

## 2017-03-09 RX ADMIN — MONTELUKAST SODIUM 10 MG: 10 TABLET ORAL at 19:40

## 2017-03-09 RX ADMIN — METRONIDAZOLE 500 MG: 500 TABLET ORAL at 05:47

## 2017-03-09 RX ADMIN — TRIAMTERENE AND HYDROCHLOROTHIAZIDE 1 TABLET: 37.5; 25 TABLET ORAL at 08:03

## 2017-03-09 RX ADMIN — ERTAPENEM SODIUM 1 G: 1 INJECTION, POWDER, LYOPHILIZED, FOR SOLUTION INTRAMUSCULAR; INTRAVENOUS at 15:37

## 2017-03-09 RX ADMIN — METRONIDAZOLE 500 MG: 500 TABLET ORAL at 15:39

## 2017-03-09 RX ADMIN — TAMOXIFEN CITRATE 10 MG: 10 TABLET ORAL at 08:03

## 2017-03-09 RX ADMIN — TAMOXIFEN CITRATE 10 MG: 10 TABLET ORAL at 19:40

## 2017-03-09 RX ADMIN — POLYETHYLENE GLYCOL (3350) 17 G: 17 POWDER, FOR SOLUTION ORAL at 08:03

## 2017-03-09 RX ADMIN — ASPIRIN 81 MG: 81 TABLET ORAL at 09:02

## 2017-03-09 NOTE — PLAN OF CARE
Problem: Patient Care Overview (Adult)  Goal: Plan of Care Review  Outcome: Ongoing (interventions implemented as appropriate)    03/09/17 0339   Coping/Psychosocial Response Interventions   Plan Of Care Reviewed With patient   Patient Care Overview   Progress improving   Outcome Evaluation   Outcome Summary/Follow up Plan No CO pain, LS clear. Nauseous and upset stomach at times, no BM, tolerating full liquid diet.          Problem: Pneumonia (Adult)  Goal: Signs and Symptoms of Listed Potential Problems Will be Absent or Manageable (Pneumonia)  Outcome: Ongoing (interventions implemented as appropriate)    Problem: Sepsis (Adult)  Goal: Signs and Symptoms of Listed Potential Problems Will be Absent or Manageable (Sepsis)  Outcome: Ongoing (interventions implemented as appropriate)

## 2017-03-09 NOTE — PLAN OF CARE
"Problem: Patient Care Overview (Adult)  Goal: Plan of Care Review  Outcome: Ongoing (interventions implemented as appropriate)    03/09/17 1636   Coping/Psychosocial Response Interventions   Plan Of Care Reviewed With patient   Patient Care Overview   Progress no change   Outcome Evaluation   Outcome Summary/Follow up Plan No fever today, IV antibiotics continue. Pt has no complaints. States \"I want to go home\".       Goal: Adult Individualization and Mutuality  Outcome: Ongoing (interventions implemented as appropriate)  Goal: Discharge Needs Assessment  Outcome: Ongoing (interventions implemented as appropriate)    Problem: Pneumonia (Adult)  Goal: Signs and Symptoms of Listed Potential Problems Will be Absent or Manageable (Pneumonia)  Outcome: Ongoing (interventions implemented as appropriate)    Problem: Sepsis (Adult)  Goal: Signs and Symptoms of Listed Potential Problems Will be Absent or Manageable (Sepsis)  Outcome: Ongoing (interventions implemented as appropriate)      "

## 2017-03-09 NOTE — PROGRESS NOTES
"    [facility] Medicine Services  INPATIENT PROGRESS NOTE    Length of Stay: 4  Date of Admission: 3/5/2017  Primary Care Physician: Lorenzo Vogel MD    Subjective   Chief Complaint: \"I'm feeling better\"  HPI     Patient reports that she's starting to feel better.  Less abdominal pain.  Less nausea; no vomiting.  Tolerating liquids without problem.  Has not had a BM in a few days, and started taking some Miralax yesterday.  Feels much better as compared to 3-4 days ago.      Review of Systems     All pertinent negatives and positives are as above. All other systems have been reviewed and are negative unless otherwise stated.     Objective    Temp:  [97.4 °F (36.3 °C)-98.3 °F (36.8 °C)] 98.1 °F (36.7 °C)  Heart Rate:  [65-76] 65  Resp:  [16-20] 18  BP: (148-173)/(69-76) 159/69  Physical Exam   Constitutional: She is oriented to person, place, and time. She appears well-developed. No distress.   HENT:   Head: Normocephalic.   Mouth/Throat: No oropharyngeal exudate.   Eyes: No scleral icterus.   Neck: No tracheal deviation present.   Pulmonary/Chest: Effort normal.   Neurological: She is alert and oriented to person, place, and time.   Psychiatric: She has a normal mood and affect. Her behavior is normal.   Vitals reviewed.      Results Review:  I have reviewed the labs, radiology results, and diagnostic studies.    Laboratory Data:     Results from last 7 days  Lab Units 03/07/17 0535 03/06/17 0657 03/05/17  0635   WBC 10*3/mm3 9.90 16.24* 15.40*   HEMOGLOBIN g/dL 11.1* 11.1* 13.4   HEMATOCRIT % 31.6* 31.2* 37.8   PLATELETS 10*3/mm3 94* 100* 127*          Results from last 7 days  Lab Units 03/07/17 0535 03/06/17 0657 03/05/17  0635   SODIUM mmol/L 139 139 134*   POTASSIUM mmol/L 4.0 2.9* 3.0*   CHLORIDE mmol/L 110 108 99   TOTAL CO2 mmol/L 24.0 24.0 25.0   BUN mg/dL 8 10 20   CREATININE mg/dL 0.51 0.53 0.62   CALCIUM mg/dL 7.6* 7.7* 9.4   BILIRUBIN mg/dL  --  3.1* 4.4*   ALK " PHOS U/L  --  56 76   ALT (SGPT) U/L  --  170* 269*   AST (SGOT) U/L  --  135* 468*   GLUCOSE mg/dL 99 77 196*       Culture Data:   BLOOD CULTURE   Date Value Ref Range Status   03/05/2017 Abnormal Stain (A)  Preliminary   03/05/2017 Escherichia coli (A)  Preliminary   03/05/2017 No growth at 4 days  Preliminary     URINE CULTURE   Date Value Ref Range Status   03/05/2017 30,000-40,000 CFU/mL Mixed Gram Positive [place] (A)  Final       Radiology Data:   Imaging Results (last 24 hours)     ** No results found for the last 24 hours. **          I have reviewed the patient current medications.     Assessment/Plan     Hospital Problem List     Sepsis        Assessment:  1.  Sepsis  2.  Acute colitis  3.  E. Coli bacteremia (1/4 bottles)  4.  N/V/D  5.  Diabetes mellitus type II  6.  Hypertension  7.  Abnormal LFTs    Plan:  1.  IV Invanz - will complete 10 days  2.  Anticipate home tomorrow with IV Invanz (multiple Abx allergies) either via home health or outpatient IV Abx at [facility].  3.  Check surveillance blood cultures  4.  CBC and CMP in AM  5.  Advance to GI soft/bland diet  6.  Case mgmt consult regarding outpt Abx arrangements    Bharat Fierro MD   03/09/17   5:34 PM

## 2017-03-10 VITALS
HEIGHT: 63 IN | HEART RATE: 67 BPM | WEIGHT: 149 LBS | RESPIRATION RATE: 20 BRPM | SYSTOLIC BLOOD PRESSURE: 109 MMHG | TEMPERATURE: 98.6 F | DIASTOLIC BLOOD PRESSURE: 60 MMHG | BODY MASS INDEX: 26.4 KG/M2 | OXYGEN SATURATION: 93 %

## 2017-03-10 PROBLEM — K52.9 ACUTE COLITIS: Status: ACTIVE | Noted: 2017-03-10

## 2017-03-10 PROBLEM — R11.0 NAUSEA: Status: ACTIVE | Noted: 2017-03-10

## 2017-03-10 PROBLEM — R94.5 ABNORMAL LIVER FUNCTION: Status: ACTIVE | Noted: 2017-03-10

## 2017-03-10 LAB
ALBUMIN SERPL-MCNC: 3.2 G/DL (ref 3.5–5)
ALBUMIN/GLOB SERPL: 1.3 G/DL (ref 1.1–2.5)
ALP SERPL-CCNC: 77 U/L (ref 24–120)
ALT SERPL W P-5'-P-CCNC: 165 U/L (ref 0–54)
ANION GAP SERPL CALCULATED.3IONS-SCNC: 6 MMOL/L (ref 4–13)
AST SERPL-CCNC: 153 U/L (ref 7–45)
BACTERIA SPEC AEROBE CULT: ABNORMAL
BACTERIA SPEC AEROBE CULT: ABNORMAL
BACTERIA SPEC AEROBE CULT: NORMAL
BILIRUB SERPL-MCNC: 0.9 MG/DL (ref 0.1–1)
BUN BLD-MCNC: 9 MG/DL (ref 5–21)
BUN/CREAT SERPL: 16.7 (ref 7–25)
CALCIUM SPEC-SCNC: 9.3 MG/DL (ref 8.4–10.4)
CHLORIDE SERPL-SCNC: 101 MMOL/L (ref 98–110)
CO2 SERPL-SCNC: 30 MMOL/L (ref 24–31)
CREAT BLD-MCNC: 0.54 MG/DL (ref 0.5–1.4)
DEPRECATED RDW RBC AUTO: 42.1 FL (ref 40–54)
ERYTHROCYTE [DISTWIDTH] IN BLOOD BY AUTOMATED COUNT: 12.4 % (ref 12–15)
GFR SERPL CREATININE-BSD FRML MDRD: 109 ML/MIN/1.73
GLOBULIN UR ELPH-MCNC: 2.5 GM/DL
GLUCOSE BLD-MCNC: 153 MG/DL (ref 70–100)
GLUCOSE BLDC GLUCOMTR-MCNC: 111 MG/DL (ref 70–130)
GLUCOSE BLDC GLUCOMTR-MCNC: 127 MG/DL (ref 70–130)
GRAM STN SPEC: ABNORMAL
HCT VFR BLD AUTO: 36.2 % (ref 37–47)
HGB BLD-MCNC: 12.4 G/DL (ref 12–16)
ISOLATED FROM: ABNORMAL
MCH RBC QN AUTO: 32 PG (ref 28–32)
MCHC RBC AUTO-ENTMCNC: 34.3 G/DL (ref 33–36)
MCV RBC AUTO: 93.3 FL (ref 82–98)
PLATELET # BLD AUTO: 170 10*3/MM3 (ref 130–400)
PMV BLD AUTO: 10 FL (ref 6–12)
POTASSIUM BLD-SCNC: 3.6 MMOL/L (ref 3.5–5.3)
PROT SERPL-MCNC: 5.7 G/DL (ref 6.3–8.7)
RBC # BLD AUTO: 3.88 10*6/MM3 (ref 4.2–5.4)
SODIUM BLD-SCNC: 137 MMOL/L (ref 135–145)
WBC NRBC COR # BLD: 7.28 10*3/MM3 (ref 4.8–10.8)

## 2017-03-10 PROCEDURE — 82962 GLUCOSE BLOOD TEST: CPT

## 2017-03-10 PROCEDURE — 94799 UNLISTED PULMONARY SVC/PX: CPT

## 2017-03-10 PROCEDURE — 85027 COMPLETE CBC AUTOMATED: CPT | Performed by: INTERNAL MEDICINE

## 2017-03-10 PROCEDURE — 94760 N-INVAS EAR/PLS OXIMETRY 1: CPT

## 2017-03-10 PROCEDURE — 80053 COMPREHEN METABOLIC PANEL: CPT | Performed by: INTERNAL MEDICINE

## 2017-03-10 PROCEDURE — 87040 BLOOD CULTURE FOR BACTERIA: CPT | Performed by: INTERNAL MEDICINE

## 2017-03-10 RX ORDER — ONDANSETRON 4 MG/1
4 TABLET, ORALLY DISINTEGRATING ORAL EVERY 8 HOURS PRN
Qty: 20 TABLET | Refills: 1 | Status: SHIPPED | OUTPATIENT
Start: 2017-03-10 | End: 2017-11-02

## 2017-03-10 RX ADMIN — ERTAPENEM SODIUM 1 G: 1 INJECTION, POWDER, LYOPHILIZED, FOR SOLUTION INTRAMUSCULAR; INTRAVENOUS at 13:21

## 2017-03-10 RX ADMIN — ASPIRIN 81 MG: 81 TABLET ORAL at 08:59

## 2017-03-10 RX ADMIN — BUDESONIDE 0.5 MG: 0.5 INHALANT RESPIRATORY (INHALATION) at 07:44

## 2017-03-10 RX ADMIN — POLYETHYLENE GLYCOL (3350) 17 G: 17 POWDER, FOR SOLUTION ORAL at 08:59

## 2017-03-10 RX ADMIN — FLUTICASONE PROPIONATE 2 SPRAY: 50 SPRAY, METERED NASAL at 09:00

## 2017-03-10 RX ADMIN — TRIAMTERENE AND HYDROCHLOROTHIAZIDE 1 TABLET: 37.5; 25 TABLET ORAL at 08:59

## 2017-03-10 RX ADMIN — TAMOXIFEN CITRATE 10 MG: 10 TABLET ORAL at 08:59

## 2017-03-10 NOTE — PROGRESS NOTES
Continued Stay Note  [facility]     Patient Name: Suzanna Jolly  MRN: [id number]  Today's Date: 3/10/2017    Admit Date: 3/5/2017          Discharge Plan       03/10/17 1532    Case Management/Social Work Plan    Plan Home Infusion / Home Health    Final Note    Final Note Libby at [facility] called SW back to inform that patient's insurance does have benefits for home infusion and patient can discharge to home today as her policy does not require precert. CHAUNCEY updated Dr. Fierro and requested home health orders. Patient has chosen [facility] and CHAUNCEY has arranged with Laura [phone]. Laura states that Dr. Vogel will not follow this patient for home health post-hospitalization. CHAUNCEY spoke to Dr. Fierro re this and he has agreed to follow for the course of the four days of IV abx. Laura with [facility] confirmed patient will be seen at discharge. Meds to be delivered to patient's home through [facility].      03/10/17 1410    Case Management/Social Work Plan    Plan Working towards discharge today    Additional Comments As an alternative plan, CHAUNCEY has submitted orders for invanz to [facility]. CHAUNCEY called and spoke to Libby at [facility] re this. Libby states that some medicare replacements do not include home infusion benefits but did confirm she will check patient's insurance policy. Charge nurse is still waiting on answer from Dr. Vogel on potential orders for patient to come outpatient for IV Invanz.               Discharge Codes     None        Expected Discharge Date and Time     Expected Discharge Date Expected Discharge Time    Mar 10, 2017             JEAN Cullen

## 2017-03-10 NOTE — PLAN OF CARE
Problem: Patient Care Overview (Adult)  Goal: Plan of Care Review  Outcome: Ongoing (interventions implemented as appropriate)    03/10/17 0438   Coping/Psychosocial Response Interventions   Plan Of Care Reviewed With patient   Patient Care Overview   Progress improving   Outcome Evaluation   Outcome Summary/Follow up Plan Ambulated in vizcaino at beginning of shift. Con't IV abx. Patient states she is ready to go home.       Goal: Adult Individualization and Mutuality  Outcome: Ongoing (interventions implemented as appropriate)  Goal: Discharge Needs Assessment  Outcome: Ongoing (interventions implemented as appropriate)    Problem: Pneumonia (Adult)  Goal: Signs and Symptoms of Listed Potential Problems Will be Absent or Manageable (Pneumonia)  Outcome: Ongoing (interventions implemented as appropriate)    Problem: Sepsis (Adult)  Goal: Signs and Symptoms of Listed Potential Problems Will be Absent or Manageable (Sepsis)  Outcome: Ongoing (interventions implemented as appropriate)

## 2017-03-10 NOTE — PLAN OF CARE
Problem: Patient Care Overview (Adult)  Goal: Plan of Care Review  Outcome: Ongoing (interventions implemented as appropriate)  Possible home today with antibiotics.  Pt states she is doing well with no problems.    Problem: Pneumonia (Adult)  Goal: Signs and Symptoms of Listed Potential Problems Will be Absent or Manageable (Pneumonia)  Outcome: Ongoing (interventions implemented as appropriate)    Problem: Sepsis (Adult)  Goal: Signs and Symptoms of Listed Potential Problems Will be Absent or Manageable (Sepsis)  Outcome: Ongoing (interventions implemented as appropriate)

## 2017-03-10 NOTE — PROGRESS NOTES
Spoke with patient about plans for [facility]. She is agreeable to admission. [facility] will go daily for IV antibiotic infusion and Dr. Fierro to follow for services. Laura Thomas RN, [facility]

## 2017-03-10 NOTE — PLAN OF CARE
Problem: Sepsis (Adult)  Goal: Signs and Symptoms of Listed Potential Problems Will be Absent or Manageable (Sepsis)  Outcome: Ongoing (interventions implemented as appropriate)  Pt being discharged home with home health for iv antibiotics.

## 2017-03-10 NOTE — PROGRESS NOTES
Continued Stay Note  [facility]     Patient Name: Suzanna Jolly  MRN: [id number]  Today's Date: 3/10/2017    Admit Date: 3/5/2017          Discharge Plan       03/10/17 0818    Case Management/Social Work Plan    Plan Potential outpatient IV abx     Additional Comments Received consult for five days of outpatient Invanz. [facility] does not take hospitalist orders which has become an issue in continuum of care for patients. However, CHAUNCEY has spoken to 3A charge nurse, Laura, re this and Laura states she will call Dr. Vogel's office to see if she can get a verbal order from Dr. Vogel. With this order, CHAUNCEY will certainly arrange for patient to come to [facility] outpatient for Invanz. Weekend doses will have to be given in ER and CHAUNCEY will arrange this as well.               Discharge Codes     None        Expected Discharge Date and Time     Expected Discharge Date Expected Discharge Time    Mar 7, 2017             JEAN Cullen

## 2017-03-10 NOTE — DISCHARGE SUMMARY
[facility] Medicine Services  DISCHARGE SUMMARY       Date of Admission: 3/5/2017  Date of Discharge:  3/10/2017  Primary Care Physician: Lorenzo Vogel MD    Presenting Problem/History of Present Illness:  Sepsis, due to unspecified organism [A41.9]     Final Discharge Diagnoses:  Hospital Problem List     Sepsis    Acute colitis    Nausea    Abnormal liver function      Discharge Diagnosis:  1. Sepsis  2. Acute colitis  3. E. Coli bacteremia (1 bottle)  4. N/V/D  5. Diabetes mellitus type II  6. Hypertension  7. Abnormal LFTs    Procedures Performed:   Imaging Results (last 7 days)     Procedure Component Value Units Date/Time    CT Abdomen Pelvis With Contrast [[id number]] Collected:  03/05/17 0813     Updated:  03/05/17 1258    Narrative:       HISTORY: Abdominal pain. History of breast cancer.     CT ABDOMEN PELVIS W CONTRAST-  CT evaluation of the abdomen and pelvis was performed with intravenous  contrast. Oral contrast was not ingested. 5 mm sequential transaxial  images were obtained. 2-D sagittal and coronal reconstructions images  were generated.     Calcified granuloma noted in the left lung base anteriorly. Left  hemidiaphragm elevation appreciated with associated atelectasis/chronic  changes, scar. Mild probable linear scarring also noted in the right  lung base.     The gallbladder is surgically absent. Biliary tree is minimally  prominent, suspect mild reservoir effect post cholecystectomy. There are  no focal hepatic lesions with calcified hepatic granuloma observed. The  hepatic venous and portal venous structures are imaged normally.     Calcified granuloma identified in the spleen.     There are no adrenal masses. No pancreatic lesions observed.     A pedunculated 3 cm cyst arising from the interpolar region of the right  kidney is observed. There are no urinary tract calculi or evidence of  obstruction. The urinary bladder is minimally distended without  focal  bladder wall abnormality.     The GYN structures are appreciated without adnexal masses.     There is stool and gas identified in the colon which is not dilated.  There is no CT evidence of significant diverticular disease.     Evaluation of the bowel without enteric contrast is suboptimal.  Particularly, the small bowel is difficult to assess for possible small  bowel wall thickening. Particularly, small bowel loops in the left upper  quadrant are difficult to assess for mild circumferential wall  thickening.     Diverticulum along the proximal duodenal sweep suspected. The stomach is  not distended.     There is no ascites or pneumoperitoneum.     There are no pathologically enlarged lymph nodes.     The bony structures are intact. Spondylosis appreciated in the  thoracolumbar spine with disc degeneration.     These findings were discussed with Dr. Robert Guthrie, emergency room  physician, at the time of dictation.       Impression:       1. Suboptimal evaluation of the bowel without enteric contrast. Mild  small bowel wall thickening is not excluded. Mild enteritis considered.  Correlate with patient presentation. No dilated bowel loops or evidence  of obstruction or definite acute bowel pathology otherwise.  2. Right nephrogenic cyst.  This report was finalized on 03/05/2017 12:56 by Dr. Oni Barrera MD.    XR Chest 1 View [[id number]] Collected:  03/05/17 0739     Updated:  03/05/17 1305    Narrative:       CHEST, ONE VIEW:     HISTORY: Abdominal pain     COMPARISON: 11/27/2013 and 11/9/2012     A single frontal chest radiograph was obtained.     FINDINGS:     The level of inspiration is shallow and lung volumes diminished.     There is mild interstitial and vague increased airspace opacities in the  lower lobes. Atelectasis and/or infiltrate considered. Correlate with  patient presentation.     The upper lung fields are clear.     The heart is normal in size without evidence of heart failure.      Degenerative spurring noted in the thoracic spine.                                  Impression:       Shallow inspiration with diminished lung volumes with interstitial and  mild airspace opacities in the lung bases. Differential considerations  include atelectasis and/or infiltrate.     This report was finalized on 03/05/2017 13:03 by Dr. Oni Barrera MD.    XR Chest PA & Lateral [[id number]] Collected:  03/06/17 0905     Updated:  03/06/17 0909    Narrative:       EXAMINATION:  XR CHEST PA AND LATERAL-  3/6/2017 9:02 AM CST     HISTORY: Follow-up abnormal chest x-ray.     COMPARISON: 03/05/2017.     TECHNIQUE: 2 views were obtained.     FINDINGS:  There is again noted slightly poor inspiration. There are  linear infiltrates in the lung bases. There is no dense consolidation.  The heart is normal in size. Minimal blunting of the costophrenic angles  is likely due to the poor inspiration. There is no pleural effusion seen  on recent CT.       Impression:       1. Linear infiltrate in both lung bases, likely atelectasis or scarring.  Pneumonia much less likely. The inspiration is not extremely deep.  2. Blunting of the costophrenic angles likely due to the changes listed  above.         This report was finalized on 03/06/2017 09:07 by Dr. Richard Haque MD.          Pertinent Test Results:   Lab Results (last 24 hours)     Procedure Component Value Units Date/Time    CBC (No Diff) [[id number]]  (Abnormal) Collected:  03/10/17 0314    Specimen:  Blood Updated:  03/10/17 0353     WBC 7.28 10*3/mm3      RBC 3.88 (L) 10*6/mm3      Hemoglobin 12.4 g/dL      Hematocrit 36.2 (L) %      MCV 93.3 fL      MCH 32.0 pg      MCHC 34.3 g/dL      RDW 12.4 %      RDW-SD 42.1 fl      MPV 10.0 fL      Platelets 170 10*3/mm3     Comprehensive Metabolic Panel [[id number]]  (Abnormal) Collected:  03/10/17 0314    Specimen:  Blood Updated:  03/10/17 0409     Glucose 153 (H) mg/dL      BUN 9 mg/dL      Creatinine 0.54 mg/dL      Sodium 137  mmol/L      Potassium 3.6 mmol/L      Chloride 101 mmol/L      CO2 30.0 mmol/L      Calcium 9.3 mg/dL      Total Protein 5.7 (L) g/dL      Albumin 3.20 (L) g/dL      ALT (SGPT) 165 (H) U/L      AST (SGOT) 153 (H) U/L      Alkaline Phosphatase 77 U/L      Total Bilirubin 0.9 mg/dL      eGFR Non African Amer 109 mL/min/1.73      Globulin 2.5 gm/dL      A/G Ratio 1.3 g/dL      BUN/Creatinine Ratio 16.7      Anion Gap 6.0 mmol/L     Narrative:       The MDRD GFR formula is only valid for adults with stable renal function between ages 18 and 70.    Blood Culture With ADILIA [[id number]] Collected:  03/10/17 0314    Specimen:  Blood from Arm, Left Updated:  03/10/17 0421    POC Glucose Fingerstick [[id number]]  (Normal) Collected:  03/10/17 0818    Specimen:  Blood Updated:  03/10/17 0841     Glucose 111 mg/dL       : [id number] [place] [facility] ID: [id number]       Blood Culture [[id number]]  (Abnormal)  (Susceptibility) Collected:  03/05/17 0859    Specimen:  Blood from Arm, Left Updated:  03/10/17 1056     Blood Culture Abnormal Stain (A)       Escherichia coli (A)      Isolated from Anaerobic Bottle      Gram Stain Result Gram negative bacilli     Narrative:       No growth aerobic bottle.    Susceptibility      Escherichia coli     MYCHAL     Ampicillin <=2 ug/ml Susceptible     Ampicillin + Sulbactam <=2 ug/ml Susceptible     Cefazolin <=4 ug/ml Susceptible     Cefepime <=1 ug/ml Susceptible     Ceftriaxone <=1 ug/ml Susceptible     Ertapenem <=0.5 ug/ml Susceptible     ESBL Confirmation Test NEG  Negative     Gentamicin <=1 ug/ml Susceptible     Levofloxacin <=0.12 ug/ml Susceptible     Meropenem <=0.25 ug/ml Susceptible     Piperacillin + Tazobactam <=4 ug/ml Susceptible     Trimethoprim + Sulfamethoxazole <=20 ug/ml Susceptible                    Blood Culture [[id number]]  (Normal) Collected:  03/05/17 0854    Specimen:  Blood from Arm, Left Updated:  03/10/17 1101     Blood Culture No growth at 5 days           Chief  "Complaint on Day of Discharge: \"I'm feeling better\"    Hospital Course:  The patient is a 76 y.o. female who presented to [facility] with a chief complaint of abdominal pain, vomiting, and diarrhea.  Records indicate the patient also was experiencing chills and rigors.  She had had no known sick contacts leading up to this hospitalization.  She has had no recent travel.  She had no recent unusual food intake.  She had generalized abdominal discomfort, worse near the midepigastric region.  In the emergency room a CAT scan was performed with IV contrast (no oral contrast) and findings were concerning for a possible enteritis.  She had evidence of leukocytosis with a white blood cell count of 16 upon arrival, in addition to an elevated lactic acid of 3.7.  She was admitted to the hospitalist service for further workup and management, with ongoing concern for acute colitis/enteritis.    Blood cultures were obtained, and did return positive for Escherichia coli (one bottle only).  Patient does have antibiotic allergies which make antibiotic selection much more difficult.  She was placed on IV ertapenem and is responded very well to this antibiotic regimen.  A gastrointestinal PCR panel was ordered, but has been unable to be performed as her symptoms of diarrhea have stopped.  Her abdominal pain is improving.  Her diet was initially nothing by mouth, and we have slowly advanced her diet during this hospitalization which she is now tolerating.  She is currently received 6 days of IV Invanz, and we will make arrangements for her to complete 4 additional days of Invanz on an outpatient basis for a total of 10 days of therapy.  Please note that I did repeat surveillance blood cultures to confirm that they are negative but this will take a total of 5 days to obtain final results.    Patient is remained afebrile.  Her white blood cell count is normalized.  Her diarrhea has stopped.  Clinically she is much better and " "plans are in place for her to receive outpatient IV Invanz for 4 additional days through our outpatient center at the time of discharge.  I would like her to follow-up with her primary care provider Dr. Vogel within 1 week for posthospitalization assessment regarding the above-mentioned issues.  I will also arrange for a repeat comprehensive metabolic profile to be completed at that time to reevaluate her transaminitis.  Please note that her transaminases have trended down, but would like this repeated prior to her outpatient follow-up to confirm that they are normalizing.    Condition on Discharge:  Stable    Physical Exam on Discharge:  Visit Vitals   • /60 (BP Location: Left arm, Patient Position: Sitting)   • Pulse 67   • Temp 98.6 °F (37 °C) (Oral)   • Resp 20   • Ht 63\" (160 cm)   • Wt 149 lb (67.6 kg)   • SpO2 93%   • BMI 26.39 kg/m2     Physical Exam  No acute distress, nontoxic-appearing, nonlabored breathing, abdomen soft, no focal tenderness to palpation, no rebound, guarding, or rigidity.    Discharge Disposition:  Home or Self Care    Discharge Medications:   Suzanna Jolly   Home Medication Instructions KOMAL:[id number]    Printed on:03/10/17 1152   Medication Information                      aspirin 81 MG EC tablet  Take 81 mg by mouth Daily.             azelastine (ASTELIN) 0.1 % nasal spray  2 sprays into each nostril 2 (Two) Times a Day. Use in each nostril as directed             beclomethasone (QVAR) 80 MCG/ACT inhaler  Inhale 1 puff 2 (Two) Times a Day.             esomeprazole (nexIUM) 40 MG capsule  Take 40 mg by mouth Daily.             fluticasone (FLONASE) 50 MCG/ACT nasal spray  2 sprays into each nostril Daily.             levocetirizine (XYZAL) 5 MG tablet  Take 5 mg by mouth Daily.             metFORMIN (GLUCOPHAGE) 500 MG tablet  Take 500 mg by mouth 2 (Two) Times a Day.             montelukast (SINGULAIR) 10 MG tablet  Take 10 mg by mouth Daily.             rosuvastatin " (CRESTOR) 10 MG tablet  Take 10 mg by mouth Daily.             tamoxifen (NOLVADEX) 10 MG tablet  Take 1 tablet by mouth 2 (Two) Times a Day.             triamterene-hydrochlorothiazide (DYAZIDE) 37.5-25 MG per capsule  Take 1 capsule by mouth Daily.               Please also note that Invanz will be prescribed for 4 additional days through our outpatient center to complete a total of 10 days of therapy.  I will also prescribe Zofran ODT which can be taken as needed for nausea.  Discharge Diet:   Diet Instructions     Advance Diet As Tolerated                     Activity at Discharge:   Activity Instructions     Activity as Tolerated                     Discharge Care Plan/Instructions: We will make arrangements to complete 4 additional days of intravenous Invanz through the [facility] outpatient center.  Follow-up with Dr. Vogel in the primary care office in one week with a repeat comprehensive metabolic profile.    Follow-up Appointments:   Future Appointments  Date Time Provider Department Center   3/28/2017 11:15 AM [facility] PAD LAB [facility] ON[facility] PAD PAD   3/28/2017 11:30 AM Montana Jimenez MD [facility] ONC PAD PAD   7/26/2017 9:30 AM PAD [facility] 2 BH PAD MA BI PAD       Test Results Pending at Discharge: surveillance blood cultures pending; repeat LFTs within 1 week    Bharat Fierro MD  03/10/17  11:57 AM    Time: 35 min

## 2017-03-10 NOTE — PROGRESS NOTES
Continued Stay Note   [place]     Patient Name: Suzanna Jolly  MRN: [id number]  Today's Date: 3/10/2017    Admit Date: 3/5/2017          Discharge Plan       03/10/17 1410    Case Management/Social Work Plan    Plan Working towards discharge today    Additional Comments As an alternative plan, CHAUNCEY has submitted orders for invanz to [facility]. CHAUNCEY called and spoke to Libby at [facility] re this. Libby states that some medicare replacements do not include home infusion benefits but did confirm she will check patient's insurance policy. Charge nurse is still waiting on answer from Dr. Vogel on potential orders for patient to come outpatient for IV Invanz.               Discharge Codes     None        Expected Discharge Date and Time     Expected Discharge Date Expected Discharge Time    Mar 10, 2017             JEAN Cullen

## 2017-03-13 ENCOUNTER — LAB REQUISITION (OUTPATIENT)
Dept: LAB | Facility: HOSPITAL | Age: 77
End: 2017-03-13

## 2017-03-13 DIAGNOSIS — Z00.00 ENCOUNTER FOR GENERAL ADULT MEDICAL EXAMINATION WITHOUT ABNORMAL FINDINGS: ICD-10-CM

## 2017-03-13 LAB
ALBUMIN SERPL-MCNC: 3.5 G/DL (ref 3.5–5)
ALBUMIN/GLOB SERPL: 1.3 G/DL (ref 1.1–2.5)
ALP SERPL-CCNC: 73 U/L (ref 24–120)
ALT SERPL W P-5'-P-CCNC: 77 U/L (ref 0–54)
ANION GAP SERPL CALCULATED.3IONS-SCNC: 9 MMOL/L (ref 4–13)
AST SERPL-CCNC: 41 U/L (ref 7–45)
BILIRUB SERPL-MCNC: 0.6 MG/DL (ref 0.1–1)
BUN BLD-MCNC: 17 MG/DL (ref 5–21)
BUN/CREAT SERPL: 27 (ref 7–25)
CALCIUM SPEC-SCNC: 9.8 MG/DL (ref 8.4–10.4)
CHLORIDE SERPL-SCNC: 97 MMOL/L (ref 98–110)
CO2 SERPL-SCNC: 30 MMOL/L (ref 24–31)
CREAT BLD-MCNC: 0.63 MG/DL (ref 0.5–1.4)
GFR SERPL CREATININE-BSD FRML MDRD: 92 ML/MIN/1.73
GLOBULIN UR ELPH-MCNC: 2.6 GM/DL
GLUCOSE BLD-MCNC: 151 MG/DL (ref 70–100)
POTASSIUM BLD-SCNC: 3.9 MMOL/L (ref 3.5–5.3)
PROT SERPL-MCNC: 6.1 G/DL (ref 6.3–8.7)
SODIUM BLD-SCNC: 136 MMOL/L (ref 135–145)

## 2017-03-13 PROCEDURE — 80053 COMPREHEN METABOLIC PANEL: CPT | Performed by: FAMILY MEDICINE

## 2017-03-13 NOTE — PAYOR COMM NOTE
"[facility] 3-10-17    [id number]    Suzanna Jolly (76 y.o. Female)     Date of Birth Social Security Number Address Home Phone MRN    1940  [address] [address] [phone] [id number]    Congregational Marital Status          [place]        Admission Date Admission Type Admitting Provider Attending Provider Department, Room/Bed    3/5/17 Emergency Bharat Fierro MD  [facility] 3A, 347/1    Discharge Date Discharge Disposition Discharge Destination        3/10/2017 Home or Self Care             Attending Provider: (none)    Allergies:  Septra [Sulfamethoxazole-trimethoprim], Keflex [Cephalexin], Ceftin [Cefuroxime], Ciprofloxacin    Isolation:  None   Infection:  None   Code Status:  Prior    Ht:  63\" (160 cm)   Wt:  149 lb (67.6 kg)    Admission Cmt:  None   Principal Problem:  None                Active Insurance as of 3/5/2017     Primary Coverage     Payor Plan Insurance Group Employer/Plan Group    [facility] MEDICARE REPLACEMENT [facility] [id number]     Payor Plan Address Payor Plan Phone Number Effective From Effective To    [address] [address]  1/1/2017     [place] [address]       Subscriber Name Subscriber Birth Date Member ID       SUZANNA JOLLY 1940 [id number]                 Emergency Contacts      (Rel.) Home Phone Work Phone Mobile Phone    Damian Jolly (Spouse) [phone] -- --            Insurance Information                [employer] MEDICARE REPLACEMENT/[employer] Phone:     Subscriber: Suzanna Jolly Subscriber#: [id number]    Group#: [id number] Precert#:           "

## 2017-03-15 LAB — BACTERIA SPEC AEROBE CULT: NORMAL

## 2017-03-27 DIAGNOSIS — C50.411 MALIGNANT NEOPLASM OF UPPER-OUTER QUADRANT OF RIGHT FEMALE BREAST (HCC): Primary | ICD-10-CM

## 2017-03-28 ENCOUNTER — LAB (OUTPATIENT)
Dept: ONCOLOGY | Facility: CLINIC | Age: 77
End: 2017-03-28

## 2017-03-28 ENCOUNTER — OFFICE VISIT (OUTPATIENT)
Dept: ONCOLOGY | Facility: CLINIC | Age: 77
End: 2017-03-28

## 2017-03-28 VITALS
OXYGEN SATURATION: 97 % | HEART RATE: 92 BPM | SYSTOLIC BLOOD PRESSURE: 98 MMHG | HEIGHT: 63 IN | DIASTOLIC BLOOD PRESSURE: 58 MMHG | TEMPERATURE: 98.3 F | BODY MASS INDEX: 25.04 KG/M2 | WEIGHT: 141.3 LBS | RESPIRATION RATE: 16 BRPM

## 2017-03-28 DIAGNOSIS — A41.51 SEPSIS DUE TO ESCHERICHIA COLI (HCC): Primary | ICD-10-CM

## 2017-03-28 DIAGNOSIS — C50.411 MALIGNANT NEOPLASM OF UPPER-OUTER QUADRANT OF RIGHT FEMALE BREAST (HCC): ICD-10-CM

## 2017-03-28 LAB
ALBUMIN SERPL-MCNC: 4.3 G/DL (ref 3.5–5)
ALBUMIN/GLOB SERPL: 1.7 G/DL
ALP SERPL-CCNC: 101 U/L (ref 38–126)
ALT SERPL W P-5'-P-CCNC: 55 U/L (ref 9–52)
ANION GAP SERPL CALCULATED.3IONS-SCNC: 13 MMOL/L
AST SERPL-CCNC: 49 U/L (ref 5–40)
AUTO MIXED CELLS #: 0.7 10*3/UL (ref 0.1–1.5)
AUTO MIXED CELLS %: 5.3 % (ref 0.2–15.1)
BILIRUB SERPL-MCNC: 0.7 MG/DL (ref 0.2–1.3)
BUN BLD-MCNC: 14 MG/DL (ref 7–26)
BUN/CREAT SERPL: 20 (ref 7–25)
CALCIUM SPEC-SCNC: 9.4 MG/DL (ref 8.4–10.2)
CHLORIDE SERPL-SCNC: 100 MMOL/L (ref 98–107)
CO2 SERPL-SCNC: 28 MMOL/L (ref 22–30)
CREAT BLD-MCNC: 0.7 MG/DL (ref 0.7–1.4)
ERYTHROCYTE [DISTWIDTH] IN BLOOD BY AUTOMATED COUNT: 12.9 % (ref 11.5–14.5)
GFR SERPL CREATININE-BSD FRML MDRD: 81 ML/MIN/1.73
GLOBULIN UR ELPH-MCNC: 2.6 GM/DL
GLUCOSE BLD-MCNC: 138 MG/DL (ref 75–110)
HCT VFR BLD AUTO: 39.9 % (ref 37–47)
HGB BLD-MCNC: 13.3 G/DL (ref 12–16)
LYMPHOCYTES # BLD AUTO: 1.6 10*3/MM3 (ref 0.8–7)
LYMPHOCYTES NFR BLD AUTO: 12.7 % (ref 10–58.5)
MCH RBC QN AUTO: 33.7 PG (ref 27–31)
MCHC RBC AUTO-ENTMCNC: 33.3 G/DL (ref 33–37)
MCV RBC AUTO: 100.9 FL (ref 81–99)
NEUTROPHILS # BLD AUTO: 10.2 10*3/MM3 (ref 2–7.8)
NEUTROPHILS NFR BLD AUTO: 82 % (ref 37–92)
PLATELET # BLD AUTO: 257 10*3/MM3 (ref 130–400)
PMV BLD AUTO: 8.2 FL (ref 6–12)
POTASSIUM BLD-SCNC: 4 MMOL/L (ref 3.6–5)
PROT SERPL-MCNC: 6.9 G/DL (ref 6.3–8.2)
RBC # BLD AUTO: 3.95 10*6/MM3 (ref 4.2–5.4)
SODIUM BLD-SCNC: 141 MMOL/L (ref 137–145)
WBC NRBC COR # BLD: 12.5 10*3/MM3 (ref 4.8–10.8)

## 2017-03-28 PROCEDURE — 80053 COMPREHEN METABOLIC PANEL: CPT | Performed by: INTERNAL MEDICINE

## 2017-03-28 PROCEDURE — 99214 OFFICE O/P EST MOD 30 MIN: CPT | Performed by: INTERNAL MEDICINE

## 2017-03-28 PROCEDURE — 85025 COMPLETE CBC W/AUTO DIFF WBC: CPT | Performed by: INTERNAL MEDICINE

## 2017-03-28 PROCEDURE — 36415 COLL VENOUS BLD VENIPUNCTURE: CPT | Performed by: INTERNAL MEDICINE

## 2017-04-27 ENCOUNTER — HOSPITAL ENCOUNTER (EMERGENCY)
Facility: HOSPITAL | Age: 77
Discharge: OTHER INSTITUTION | End: 2017-04-27
Attending: EMERGENCY MEDICINE | Admitting: EMERGENCY MEDICINE

## 2017-04-27 ENCOUNTER — APPOINTMENT (OUTPATIENT)
Dept: CT IMAGING | Facility: HOSPITAL | Age: 77
End: 2017-04-27

## 2017-04-27 VITALS
SYSTOLIC BLOOD PRESSURE: 152 MMHG | DIASTOLIC BLOOD PRESSURE: 72 MMHG | RESPIRATION RATE: 18 BRPM | HEIGHT: 63 IN | HEART RATE: 92 BPM | BODY MASS INDEX: 24.89 KG/M2 | TEMPERATURE: 100.2 F | OXYGEN SATURATION: 92 % | WEIGHT: 140.5 LBS

## 2017-04-27 DIAGNOSIS — K80.50 CHOLEDOCHOLITHIASIS: Primary | ICD-10-CM

## 2017-04-27 DIAGNOSIS — R50.9 FEVER CHILLS: ICD-10-CM

## 2017-04-27 DIAGNOSIS — R10.10 PAIN OF UPPER ABDOMEN: ICD-10-CM

## 2017-04-27 LAB
ALBUMIN SERPL-MCNC: 3.9 G/DL (ref 3.5–5)
ALBUMIN/GLOB SERPL: 1.4 G/DL (ref 1.1–2.5)
ALP SERPL-CCNC: 180 U/L (ref 24–120)
ALT SERPL W P-5'-P-CCNC: 126 U/L (ref 0–54)
AMYLASE SERPL-CCNC: 78 U/L (ref 30–110)
ANION GAP SERPL CALCULATED.3IONS-SCNC: 15 MMOL/L (ref 4–13)
APTT PPP: 25.9 SECONDS (ref 24.1–34.8)
AST SERPL-CCNC: 208 U/L (ref 7–45)
BASOPHILS # BLD AUTO: 0.01 10*3/MM3 (ref 0–0.2)
BASOPHILS NFR BLD AUTO: 0.1 % (ref 0–2)
BILIRUB SERPL-MCNC: 2.4 MG/DL (ref 0.1–1)
BILIRUB UR QL STRIP: NEGATIVE
BUN BLD-MCNC: 14 MG/DL (ref 5–21)
BUN/CREAT SERPL: 22.2 (ref 7–25)
CALCIUM SPEC-SCNC: 10.1 MG/DL (ref 8.4–10.4)
CHLORIDE SERPL-SCNC: 98 MMOL/L (ref 98–110)
CLARITY UR: CLEAR
CO2 SERPL-SCNC: 24 MMOL/L (ref 24–31)
COLOR UR: ABNORMAL
CREAT BLD-MCNC: 0.63 MG/DL (ref 0.5–1.4)
D DIMER PPP FEU-MCNC: 2.81 MG/L (FEU) (ref 0–0.5)
D-LACTATE SERPL-SCNC: 1.2 MMOL/L (ref 0.5–2)
D-LACTATE SERPL-SCNC: 2.2 MMOL/L (ref 0.5–2)
DEPRECATED RDW RBC AUTO: 41.6 FL (ref 40–54)
EOSINOPHIL # BLD AUTO: 0.01 10*3/MM3 (ref 0–0.7)
EOSINOPHIL NFR BLD AUTO: 0.1 % (ref 0–4)
ERYTHROCYTE [DISTWIDTH] IN BLOOD BY AUTOMATED COUNT: 12.4 % (ref 12–15)
GFR SERPL CREATININE-BSD FRML MDRD: 92 ML/MIN/1.73
GLOBULIN UR ELPH-MCNC: 2.8 GM/DL
GLUCOSE BLD-MCNC: 153 MG/DL (ref 70–100)
GLUCOSE UR STRIP-MCNC: NEGATIVE MG/DL
HCT VFR BLD AUTO: 38.8 % (ref 37–47)
HGB BLD-MCNC: 14.2 G/DL (ref 12–16)
HGB UR QL STRIP.AUTO: NEGATIVE
IMM GRANULOCYTES # BLD: 0.05 10*3/MM3 (ref 0–0.03)
IMM GRANULOCYTES NFR BLD: 0.3 % (ref 0–5)
INR PPP: 0.99 (ref 0.91–1.09)
KETONES UR QL STRIP: ABNORMAL
LEUKOCYTE ESTERASE UR QL STRIP.AUTO: NEGATIVE
LIPASE SERPL-CCNC: 115 U/L (ref 23–203)
LYMPHOCYTES # BLD AUTO: 0.52 10*3/MM3 (ref 0.72–4.86)
LYMPHOCYTES NFR BLD AUTO: 3.5 % (ref 15–45)
MCH RBC QN AUTO: 33.7 PG (ref 28–32)
MCHC RBC AUTO-ENTMCNC: 36.6 G/DL (ref 33–36)
MCV RBC AUTO: 92.2 FL (ref 82–98)
MONOCYTES # BLD AUTO: 0.29 10*3/MM3 (ref 0.19–1.3)
MONOCYTES NFR BLD AUTO: 2 % (ref 4–12)
NEUTROPHILS # BLD AUTO: 13.87 10*3/MM3 (ref 1.87–8.4)
NEUTROPHILS NFR BLD AUTO: 94 % (ref 39–78)
NITRITE UR QL STRIP: NEGATIVE
NT-PROBNP SERPL-MCNC: 310 PG/ML (ref 0–1800)
PH UR STRIP.AUTO: 8 [PH] (ref 5–8)
PLATELET # BLD AUTO: 161 10*3/MM3 (ref 130–400)
PMV BLD AUTO: 9.8 FL (ref 6–12)
POTASSIUM BLD-SCNC: 3.8 MMOL/L (ref 3.5–5.3)
PROCALCITONIN SERPL-MCNC: 0.64 NG/ML
PROT SERPL-MCNC: 6.7 G/DL (ref 6.3–8.7)
PROT UR QL STRIP: NEGATIVE
PROTHROMBIN TIME: 13.4 SECONDS (ref 11.9–14.6)
RBC # BLD AUTO: 4.21 10*6/MM3 (ref 4.2–5.4)
SODIUM BLD-SCNC: 137 MMOL/L (ref 135–145)
SP GR UR STRIP: 1.01 (ref 1–1.03)
TROPONIN I SERPL-MCNC: 0 NG/ML (ref 0–0.07)
TSH SERPL DL<=0.05 MIU/L-ACNC: 3.92 MIU/ML (ref 0.47–4.68)
UROBILINOGEN UR QL STRIP: ABNORMAL
WBC NRBC COR # BLD: 14.75 10*3/MM3 (ref 4.8–10.8)

## 2017-04-27 PROCEDURE — 87150 DNA/RNA AMPLIFIED PROBE: CPT | Performed by: EMERGENCY MEDICINE

## 2017-04-27 PROCEDURE — 25010000002 ONDANSETRON PER 1 MG: Performed by: EMERGENCY MEDICINE

## 2017-04-27 PROCEDURE — 87186 SC STD MICRODIL/AGAR DIL: CPT | Performed by: EMERGENCY MEDICINE

## 2017-04-27 PROCEDURE — 84145 PROCALCITONIN (PCT): CPT | Performed by: EMERGENCY MEDICINE

## 2017-04-27 PROCEDURE — 87077 CULTURE AEROBIC IDENTIFY: CPT | Performed by: EMERGENCY MEDICINE

## 2017-04-27 PROCEDURE — 96375 TX/PRO/DX INJ NEW DRUG ADDON: CPT

## 2017-04-27 PROCEDURE — 99284 EMERGENCY DEPT VISIT MOD MDM: CPT

## 2017-04-27 PROCEDURE — 74177 CT ABD & PELVIS W/CONTRAST: CPT

## 2017-04-27 PROCEDURE — 80053 COMPREHEN METABOLIC PANEL: CPT | Performed by: EMERGENCY MEDICINE

## 2017-04-27 PROCEDURE — 87040 BLOOD CULTURE FOR BACTERIA: CPT | Performed by: EMERGENCY MEDICINE

## 2017-04-27 PROCEDURE — 82150 ASSAY OF AMYLASE: CPT | Performed by: EMERGENCY MEDICINE

## 2017-04-27 PROCEDURE — 84484 ASSAY OF TROPONIN QUANT: CPT

## 2017-04-27 PROCEDURE — 85730 THROMBOPLASTIN TIME PARTIAL: CPT | Performed by: EMERGENCY MEDICINE

## 2017-04-27 PROCEDURE — 71275 CT ANGIOGRAPHY CHEST: CPT

## 2017-04-27 PROCEDURE — 85610 PROTHROMBIN TIME: CPT | Performed by: EMERGENCY MEDICINE

## 2017-04-27 PROCEDURE — 96376 TX/PRO/DX INJ SAME DRUG ADON: CPT

## 2017-04-27 PROCEDURE — 83880 ASSAY OF NATRIURETIC PEPTIDE: CPT | Performed by: EMERGENCY MEDICINE

## 2017-04-27 PROCEDURE — 93010 ELECTROCARDIOGRAM REPORT: CPT | Performed by: INTERNAL MEDICINE

## 2017-04-27 PROCEDURE — 0 IOPAMIDOL PER 1 ML: Performed by: EMERGENCY MEDICINE

## 2017-04-27 PROCEDURE — 93005 ELECTROCARDIOGRAM TRACING: CPT | Performed by: EMERGENCY MEDICINE

## 2017-04-27 PROCEDURE — 83690 ASSAY OF LIPASE: CPT | Performed by: EMERGENCY MEDICINE

## 2017-04-27 PROCEDURE — 84443 ASSAY THYROID STIM HORMONE: CPT | Performed by: EMERGENCY MEDICINE

## 2017-04-27 PROCEDURE — 83605 ASSAY OF LACTIC ACID: CPT | Performed by: EMERGENCY MEDICINE

## 2017-04-27 PROCEDURE — 96361 HYDRATE IV INFUSION ADD-ON: CPT

## 2017-04-27 PROCEDURE — 36415 COLL VENOUS BLD VENIPUNCTURE: CPT | Performed by: EMERGENCY MEDICINE

## 2017-04-27 PROCEDURE — 96365 THER/PROPH/DIAG IV INF INIT: CPT

## 2017-04-27 PROCEDURE — 81003 URINALYSIS AUTO W/O SCOPE: CPT | Performed by: EMERGENCY MEDICINE

## 2017-04-27 PROCEDURE — 25010000002 HYDROMORPHONE PER 4 MG: Performed by: EMERGENCY MEDICINE

## 2017-04-27 PROCEDURE — 85379 FIBRIN DEGRADATION QUANT: CPT | Performed by: EMERGENCY MEDICINE

## 2017-04-27 PROCEDURE — 85025 COMPLETE CBC W/AUTO DIFF WBC: CPT | Performed by: EMERGENCY MEDICINE

## 2017-04-27 RX ORDER — ONDANSETRON 2 MG/ML
4 INJECTION INTRAMUSCULAR; INTRAVENOUS ONCE
Status: COMPLETED | OUTPATIENT
Start: 2017-04-27 | End: 2017-04-27

## 2017-04-27 RX ORDER — SODIUM CHLORIDE 9 MG/ML
125 INJECTION, SOLUTION INTRAVENOUS CONTINUOUS
Status: DISCONTINUED | OUTPATIENT
Start: 2017-04-27 | End: 2017-04-27 | Stop reason: HOSPADM

## 2017-04-27 RX ADMIN — IOPAMIDOL 150 ML: 755 INJECTION, SOLUTION INTRAVENOUS at 16:47

## 2017-04-27 RX ADMIN — ONDANSETRON 4 MG: 2 INJECTION INTRAMUSCULAR; INTRAVENOUS at 17:51

## 2017-04-27 RX ADMIN — ERTAPENEM SODIUM 1 G: 1 INJECTION, POWDER, LYOPHILIZED, FOR SOLUTION INTRAMUSCULAR; INTRAVENOUS at 19:56

## 2017-04-27 RX ADMIN — ONDANSETRON 4 MG: 2 INJECTION INTRAMUSCULAR; INTRAVENOUS at 15:54

## 2017-04-27 RX ADMIN — SODIUM CHLORIDE 125 ML/HR: 9 INJECTION, SOLUTION INTRAVENOUS at 15:51

## 2017-04-27 RX ADMIN — HYDROMORPHONE HYDROCHLORIDE 1 MG: 1 INJECTION, SOLUTION INTRAMUSCULAR; INTRAVENOUS; SUBCUTANEOUS at 15:54

## 2017-04-27 RX ADMIN — ONDANSETRON 4 MG: 2 INJECTION INTRAMUSCULAR; INTRAVENOUS at 20:31

## 2017-04-28 LAB — BACTERIA BLD CULT: ABNORMAL

## 2017-05-02 LAB — BACTERIA SPEC AEROBE CULT: NORMAL

## 2017-05-03 LAB
BACTERIA SPEC AEROBE CULT: ABNORMAL
BACTERIA SPEC AEROBE CULT: ABNORMAL
GRAM STN SPEC: ABNORMAL
ISOLATED FROM: ABNORMAL

## 2017-05-15 RX ORDER — TAMOXIFEN CITRATE 10 MG/1
TABLET ORAL
Qty: 180 TABLET | Refills: 2 | Status: SHIPPED | OUTPATIENT
Start: 2017-05-15 | End: 2017-10-09 | Stop reason: SDUPTHER

## 2017-07-26 ENCOUNTER — HOSPITAL ENCOUNTER (OUTPATIENT)
Dept: MAMMOGRAPHY | Facility: HOSPITAL | Age: 77
Discharge: HOME | End: 2017-07-26
Attending: SPECIALIST | Admitting: SPECIALIST

## 2017-07-26 DIAGNOSIS — Z00.00 ROUTINE ADULT HEALTH MAINTENANCE: ICD-10-CM

## 2017-07-26 PROCEDURE — G0202 SCR MAMMO BI INCL CAD: HCPCS

## 2017-07-26 PROCEDURE — 77063 BREAST TOMOSYNTHESIS BI: CPT

## 2017-08-03 ENCOUNTER — TRANSCRIBE ORDERS (OUTPATIENT)
Dept: ADMINISTRATIVE | Facility: HOSPITAL | Age: 77
End: 2017-08-03

## 2017-08-03 DIAGNOSIS — R92.1 BREAST CALCIFICATION, RIGHT: ICD-10-CM

## 2017-08-03 DIAGNOSIS — R92.2 BREAST DENSITY: Primary | ICD-10-CM

## 2017-08-08 ENCOUNTER — HOSPITAL ENCOUNTER (OUTPATIENT)
Dept: MAMMOGRAPHY | Facility: HOSPITAL | Age: 77
Discharge: HOME | End: 2017-08-08
Attending: SPECIALIST | Admitting: SPECIALIST

## 2017-08-08 ENCOUNTER — HOSPITAL ENCOUNTER (OUTPATIENT)
Dept: MAMMOGRAPHY | Facility: HOSPITAL | Age: 77
Discharge: HOME | End: 2017-08-08
Attending: SPECIALIST

## 2017-08-08 ENCOUNTER — LAB (OUTPATIENT)
Dept: LAB | Facility: HOSPITAL | Age: 77
End: 2017-08-08
Attending: SPECIALIST

## 2017-08-08 ENCOUNTER — TRANSCRIBE ORDERS (OUTPATIENT)
Dept: ADMINISTRATIVE | Facility: HOSPITAL | Age: 77
End: 2017-08-08

## 2017-08-08 VITALS — DIASTOLIC BLOOD PRESSURE: 68 MMHG | SYSTOLIC BLOOD PRESSURE: 152 MMHG

## 2017-08-08 DIAGNOSIS — R92.1 BREAST CALCIFICATION, RIGHT: Primary | ICD-10-CM

## 2017-08-08 DIAGNOSIS — R92.2 BREAST DENSITY: ICD-10-CM

## 2017-08-08 DIAGNOSIS — R92.1 BREAST CALCIFICATION, RIGHT: ICD-10-CM

## 2017-08-08 LAB
INR PPP: 0.9 (ref 0.91–1.09)
PROTHROMBIN TIME: 11.1 SECONDS (ref 10–13.8)

## 2017-08-08 PROCEDURE — 88305 TISSUE EXAM BY PATHOLOGIST: CPT | Performed by: SPECIALIST

## 2017-08-08 PROCEDURE — A4648 IMPLANTABLE TISSUE MARKER: HCPCS

## 2017-08-08 PROCEDURE — 85610 PROTHROMBIN TIME: CPT | Performed by: SPECIALIST

## 2017-08-08 RX ORDER — LIDOCAINE HYDROCHLORIDE AND EPINEPHRINE 10; 10 MG/ML; UG/ML
10 INJECTION, SOLUTION INFILTRATION; PERINEURAL ONCE
Status: DISCONTINUED | OUTPATIENT
Start: 2017-08-08 | End: 2017-11-02

## 2017-08-08 RX ORDER — LIDOCAINE HYDROCHLORIDE 10 MG/ML
10 INJECTION, SOLUTION INFILTRATION; PERINEURAL ONCE
Status: DISCONTINUED | OUTPATIENT
Start: 2017-08-08 | End: 2017-11-02

## 2017-08-09 LAB
CYTO UR: NORMAL
LAB AP CASE REPORT: NORMAL
LAB AP CLINICAL INFORMATION: NORMAL
LAB AP INTRADEPARTMENTAL CONSULT: NORMAL
Lab: NORMAL
PATH REPORT.FINAL DX SPEC: NORMAL
PATH REPORT.GROSS SPEC: NORMAL

## 2017-09-13 ENCOUNTER — TRANSCRIBE ORDERS (OUTPATIENT)
Dept: ADMINISTRATIVE | Facility: HOSPITAL | Age: 77
End: 2017-09-13

## 2017-09-13 DIAGNOSIS — R92.1 BREAST CALCIFICATION, RIGHT: Primary | ICD-10-CM

## 2017-10-02 ENCOUNTER — LAB (OUTPATIENT)
Dept: LAB | Facility: HOSPITAL | Age: 77
End: 2017-10-02

## 2017-10-02 ENCOUNTER — OFFICE VISIT (OUTPATIENT)
Dept: ONCOLOGY | Facility: CLINIC | Age: 77
End: 2017-10-02

## 2017-10-02 VITALS
DIASTOLIC BLOOD PRESSURE: 80 MMHG | HEART RATE: 80 BPM | OXYGEN SATURATION: 97 % | BODY MASS INDEX: 25.71 KG/M2 | RESPIRATION RATE: 16 BRPM | WEIGHT: 145.1 LBS | SYSTOLIC BLOOD PRESSURE: 122 MMHG | HEIGHT: 63 IN | TEMPERATURE: 97.9 F

## 2017-10-02 DIAGNOSIS — D72.829 LEUKOCYTOSIS, UNSPECIFIED TYPE: Primary | ICD-10-CM

## 2017-10-02 DIAGNOSIS — C50.411 MALIGNANT NEOPLASM OF UPPER-OUTER QUADRANT OF RIGHT FEMALE BREAST, UNSPECIFIED ESTROGEN RECEPTOR STATUS (HCC): Primary | ICD-10-CM

## 2017-10-02 DIAGNOSIS — D05.11 BREAST NEOPLASM, TIS (DCIS), RIGHT: ICD-10-CM

## 2017-10-02 LAB
ALBUMIN SERPL-MCNC: 3.9 G/DL (ref 3.5–5)
ALBUMIN/GLOB SERPL: 1.4 G/DL (ref 1.1–2.5)
ALP SERPL-CCNC: 39 U/L (ref 24–120)
ALT SERPL W P-5'-P-CCNC: 34 U/L (ref 0–54)
ANION GAP SERPL CALCULATED.3IONS-SCNC: 8 MMOL/L (ref 4–13)
AST SERPL-CCNC: 27 U/L (ref 7–45)
AUTO MIXED CELLS #: 0.7 10*3/MM3 (ref 0.1–2.6)
AUTO MIXED CELLS %: 6 % (ref 0.1–24)
BILIRUB SERPL-MCNC: 0.4 MG/DL (ref 0.1–1)
BUN BLD-MCNC: 15 MG/DL (ref 5–21)
BUN/CREAT SERPL: 23.8
CALCIUM SPEC-SCNC: 9.2 MG/DL (ref 8.4–10.4)
CHLORIDE SERPL-SCNC: 102 MMOL/L (ref 98–110)
CO2 SERPL-SCNC: 29 MMOL/L (ref 24–31)
CREAT BLD-MCNC: 0.63 MG/DL (ref 0.5–1.4)
ERYTHROCYTE [DISTWIDTH] IN BLOOD BY AUTOMATED COUNT: 12.2 % (ref 12–15)
GFR SERPL CREATININE-BSD FRML MDRD: 91 ML/MIN/1.73
GLOBULIN UR ELPH-MCNC: 2.7 GM/DL
GLUCOSE BLD-MCNC: 94 MG/DL (ref 70–100)
HCT VFR BLD AUTO: 38.9 % (ref 37–47)
HGB BLD-MCNC: 13.8 G/DL (ref 12–16)
HOLD SPECIMEN: NORMAL
LYMPHOCYTES # BLD AUTO: 1.5 10*3/MM3 (ref 0.8–7)
LYMPHOCYTES NFR BLD AUTO: 13 % (ref 15–45)
MCH RBC QN AUTO: 33.2 PG (ref 28–32)
MCHC RBC AUTO-ENTMCNC: 35.5 G/DL (ref 33–36)
MCV RBC AUTO: 93.5 FL (ref 82–98)
NEUTROPHILS # BLD AUTO: 9 10*3/MM3 (ref 1.5–8.3)
NEUTROPHILS NFR BLD AUTO: 81 % (ref 39–78)
PLATELET # BLD AUTO: 164 10*3/MM3 (ref 130–400)
PMV BLD AUTO: 8.8 FL (ref 6–12)
POTASSIUM BLD-SCNC: 3.9 MMOL/L (ref 3.5–5.3)
PROT SERPL-MCNC: 6.6 G/DL (ref 6.3–8.7)
RBC # BLD AUTO: 4.16 10*6/MM3 (ref 4.2–5.4)
SODIUM BLD-SCNC: 139 MMOL/L (ref 135–145)
WBC NRBC COR # BLD: 11.2 10*3/MM3 (ref 4.8–10.8)

## 2017-10-02 PROCEDURE — 99214 OFFICE O/P EST MOD 30 MIN: CPT | Performed by: INTERNAL MEDICINE

## 2017-10-02 PROCEDURE — 36415 COLL VENOUS BLD VENIPUNCTURE: CPT

## 2017-10-02 PROCEDURE — 80053 COMPREHEN METABOLIC PANEL: CPT | Performed by: INTERNAL MEDICINE

## 2017-10-02 PROCEDURE — 85025 COMPLETE CBC W/AUTO DIFF WBC: CPT | Performed by: INTERNAL MEDICINE

## 2017-10-02 NOTE — PROGRESS NOTES
[facility]  HEMATOLOGY & ONCOLOGY    Cancer Staging Information:  No matching staging information was found for the patient.      Subjective     VISIT DIAGNOSIS:   Encounter Diagnoses   Name Primary?   • Leukocytosis, unspecified type Yes   • Breast neoplasm, Tis (DCIS), right        REASON FOR VISIT:     Chief Complaint   Patient presents with   • ER positive DCIS     Follow-up        HEMATOLOGY / ONCOLOGY HISTORY:    No history exists.           INTERVAL HISTORY  Patient ID: Suzanna Jolly is a 77 y.o. 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  She goes for her yearly Pap smear she is due for another one soon.  She denies any bloody secretions per vagina.  She had mammogram August 2017 show some abnormality that was biopsied and came back as benign.  Last mammogram 6 months from prior.  She biopsy was complicated by infection of the biopsy site currently resolved.  She has a history of osteoporosis was on Boniva, calcium with vitamin D. Per patient last DEXA scan showed improvement hands Boniva discontinued.    Past Medical History:   Past Medical History:   Diagnosis Date   • Anemia, unspecified    • Bone/cartilage disorder    • Bronchitis    • Chronic obstructive asthma with status asthmaticus    • History of bone density study     DR. HONEYCUTT   • Hx of radiation therapy    • Hypertension    • Malignant neoplasm of upper-outer quadrant of right female breast     RIGHT SIDED BREAST CANCER WITH LUMPECTOMY   • Osteoporosis    • Pure hypercholesterolemia    • Type 2 diabetes mellitus without complications      Past Surgical History:   Past Surgical History:   Procedure Laterality Date   • BREAST BIOPSY Right 11/11/2013   • BREAST BIOPSY Right 1972     with Benign findings   • BREAST  LUMPECTOMY Right 12/03/2013   • BREAST LUMPECTOMY      RIGHT   • CHOLECYSTECTOMY     • COLONOSCOPY  2010     Dr. Gardiner. facility used [facility]   • FOOT SURGERY  09/2014     Dr. Sherri Saha in [facility]   • MAMMO BILATERAL  08/19/2014   • PAP SMEAR     • SKIN BIOPSY Right 11/16/2013    FOOT AND LEG; BENIGN FINDINGS   • TUBAL ABDOMINAL LIGATION       Social History:   Social History     Social History   • Marital status:      Spouse name: N/A   • Number of children: N/A   • Years of education: N/A     Occupational History   • Not on file.     Social History Main Topics   • Smoking status: Never Smoker   • Smokeless tobacco: Not on file   • Alcohol use No   • Drug use: No   • Sexual activity: Not on file     Other Topics Concern   • Not on file     Social History Narrative     Family History:   Family History   Problem Relation Age of Onset   • Stroke Brother    • No Known Problems Daughter    • No Known Problems Son    • Other Brother      hx of many comorbidities   • Down syndrome Brother    • Stroke Mother    • Pneumonia Father    • Stroke Father        Review of Systems   Constitutional: Negative.    HENT: Negative.    Eyes: Negative.    Respiratory: Negative.    Cardiovascular: Negative.    Gastrointestinal: Negative.    Endocrine: Negative.    Genitourinary: Negative.    Musculoskeletal: Negative.    Skin: Negative.    Allergic/Immunologic: Negative.    Neurological: Negative.    Hematological: Negative.    Psychiatric/Behavioral: Negative.         Performance Status:  Asymptomatic    Medications:    Current Outpatient Prescriptions   Medication Sig Dispense Refill   • aspirin 81 MG EC tablet Take 81 mg by mouth Daily.     • azelastine (ASTELIN) 0.1 % nasal spray 2 sprays into each nostril 2 (Two) Times a Day. Use in each nostril as directed     • beclomethasone (QVAR) 80 MCG/ACT inhaler Inhale 1 puff 2 (Two) Times a Day.     • Dexlansoprazole (DEXILANT PO) Take  by mouth.     • Dextromethorphan  "HBr (DEXALONE PO) Take  by mouth.     • fluticasone (FLONASE) 50 MCG/ACT nasal spray 2 sprays into each nostril Daily.     • levocetirizine (XYZAL) 5 MG tablet Take 5 mg by mouth Daily.     • metFORMIN (GLUCOPHAGE) 500 MG tablet Take 500 mg by mouth 2 (Two) Times a Day.     • montelukast (SINGULAIR) 10 MG tablet Take 10 mg by mouth Daily.     • ondansetron ODT (ZOFRAN-ODT) 4 MG disintegrating tablet Take 1 tablet by mouth Every 8 (Eight) Hours As Needed for Nausea or Vomiting. 20 tablet 1   • rosuvastatin (CRESTOR) 10 MG tablet Take 10 mg by mouth Daily.     • tamoxifen (NOLVADEX) 10 MG tablet TAKE 1 TABLET BY MOUTH TWICE A  tablet 2   • triamterene-hydrochlorothiazide (DYAZIDE) 37.5-25 MG per capsule Take 1 capsule by mouth Daily.       Current Facility-Administered Medications   Medication Dose Route Frequency Provider Last Rate Last Dose   • lidocaine (XYLOCAINE) 1 % injection 10 mL  10 mL Subcutaneous Once Melina Lan MD       • lidocaine-EPINEPHrine (XYLOCAINE W/EPI) 1 %-1:[id number] injection 10 mL  10 mL Injection Once Melina Lan MD       • sodium bicarbonate injection 2.5 mEq  5 mL Injection Once Melina Lan MD           ALLERGIES:    Allergies   Allergen Reactions   • Septra [Sulfamethoxazole-Trimethoprim] Hives and Nausea And Vomiting   • Ceftin [Cefuroxime] Hives and GI Intolerance   • Cefuroxime Axetil Hives and Nausea And Vomiting   • Ciprofloxacin Hives, Rash and Nausea And Vomiting   • Keflex [Cephalexin] Hives, GI Intolerance and Nausea And Vomiting       Objective      Vitals:    10/02/17 1104   BP: 122/80   Pulse: 80   Resp: 16   Temp: 97.9 °F (36.6 °C)   TempSrc: Tympanic   SpO2: 97%   Weight: 145 lb 1.6 oz (65.8 kg)   Height: 63\" (160 cm)         Current Status 10/2/2017   ECOG score 0         Physical Exam    RECENT LABS:  Orders Only on 10/02/2017   Component Date Value Ref Range Status   • Glucose 10/02/2017 94  70 - 100 mg/dL Final   • BUN 10/02/2017 15  5 - 21 mg/dL Final   • " Creatinine 10/02/2017 0.63  0.50 - 1.40 mg/dL Final   • Sodium 10/02/2017 139  135 - 145 mmol/L Final   • Potassium 10/02/2017 3.9  3.5 - 5.3 mmol/L Final   • Chloride 10/02/2017 102  98 - 110 mmol/L Final   • CO2 10/02/2017 29.0  24.0 - 31.0 mmol/L Final   • Calcium 10/02/2017 9.2  8.4 - 10.4 mg/dL Final   • Total Protein 10/02/2017 6.6  6.3 - 8.7 g/dL Final   • Albumin 10/02/2017 3.90  3.50 - 5.00 g/dL Final   • ALT (SGPT) 10/02/2017 34  0 - 54 U/L Final   • AST (SGOT) 10/02/2017 27  7 - 45 U/L Final   • Alkaline Phosphatase 10/02/2017 39  24 - 120 U/L Final   • Total Bilirubin 10/02/2017 0.4  0.1 - 1.0 mg/dL Final   • eGFR Non African Amer 10/02/2017 91  >60 mL/min/1.73 Final   • Globulin 10/02/2017 2.7  gm/dL Final   • A/G Ratio 10/02/2017 1.4  1.1 - 2.5 g/dL Final   • BUN/Creatinine Ratio 10/02/2017 23.8    Final   • Anion Gap 10/02/2017 8.0  4.0 - 13.0 mmol/L Final   • WBC 10/02/2017 11.20* 4.80 - 10.80 10*3/mm3 Final   • RBC 10/02/2017 4.16* 4.20 - 5.40 10*6/mm3 Final   • Hemoglobin 10/02/2017 13.8  12.0 - 16.0 g/dL Final   • Hematocrit 10/02/2017 38.9  37.0 - 47.0 % Final   • MCV 10/02/2017 93.5  82.0 - 98.0 fL Final   • MCH 10/02/2017 33.2* 28.0 - 32.0 pg Final   • MCHC 10/02/2017 35.5  33.0 - 36.0 g/dL Final   • RDW 10/02/2017 12.2  12.0 - 15.0 % Final   • MPV 10/02/2017 8.8  6.0 - 12.0 fL Final   • Platelets 10/02/2017 164  130 - 400 10*3/mm3 Final   • Neutrophil % 10/02/2017 81.0* 39.0 - 78.0 % Final   • Lymphocyte % 10/02/2017 13.0* 15.0 - 45.0 % Final   • Auto Mixed Cells % 10/02/2017 6.0  0.1 - 24.0 % Final   • Neutrophils, Absolute 10/02/2017 9.00* 1.50 - 8.30 10*3/mm3 Final   • Lymphocytes, Absolute 10/02/2017 1.50  0.80 - 7.00 10*3/mm3 Final   • Auto Mixed Cells # 10/02/2017 0.70  0.10 - 2.60 10*3/mm3 Final       RADIOLOGY:  No results found.         Assessment/Plan  Suzanna Jolly is a 77 y.o. year old female with ER positive DCIS, status post right breast lumpectomy, status post radiation,  currently on tamoxifen since February 2013.    Patient Active Problem List   Diagnosis   • Sepsis   • Acute colitis   • Nausea   • Abnormal liver function   • Ductal carcinoma in situ (DCIS) of right breast          1.Comtinue tamoxifen for a total of 5 years. Continue yearly Pap smear with her gynecologist.  Next mammogram in January 2018.    2.Osteoporosis: Status post Boniva.  Continue calcium with vitamin D, and at least 1200 of calcium, and 600 of vitamin D.  Patient takes only 1 tablet and consumes lots of vitamin D contiaining foods like cheese, yogurt, and milk.    3.  Mild leukocytosis: Check flow cytometry for CLL.        Dread Moon MD    10/2/2017    11:42 AM

## 2017-10-09 RX ORDER — TAMOXIFEN CITRATE 10 MG/1
10 TABLET ORAL 2 TIMES DAILY
Qty: 180 TABLET | Refills: 3 | Status: SHIPPED | OUTPATIENT
Start: 2017-10-09 | End: 2017-10-11 | Stop reason: SDUPTHER

## 2017-10-11 RX ORDER — TAMOXIFEN CITRATE 10 MG/1
10 TABLET ORAL 2 TIMES DAILY
Qty: 540 TABLET | Refills: 3 | Status: SHIPPED | OUTPATIENT
Start: 2017-10-11 | End: 2018-04-03 | Stop reason: SDUPTHER

## 2017-11-28 ENCOUNTER — OFFICE VISIT (OUTPATIENT)
Dept: OBGYN | Age: 77
End: 2017-11-28
Payer: MEDICARE

## 2017-11-28 VITALS
HEIGHT: 63 IN | HEART RATE: 89 BPM | BODY MASS INDEX: 26.05 KG/M2 | SYSTOLIC BLOOD PRESSURE: 132 MMHG | WEIGHT: 147 LBS | DIASTOLIC BLOOD PRESSURE: 77 MMHG

## 2017-11-28 DIAGNOSIS — Z79.810 CARE RELATED TO CURRENT TAMOXIFEN USE: ICD-10-CM

## 2017-11-28 DIAGNOSIS — Z12.31 ENCOUNTER FOR SCREENING MAMMOGRAM FOR BREAST CANCER: Primary | ICD-10-CM

## 2017-11-28 DIAGNOSIS — Z12.4 SCREENING FOR CERVICAL CANCER: ICD-10-CM

## 2017-11-28 DIAGNOSIS — Z01.419 WELL WOMAN EXAM WITH ROUTINE GYNECOLOGICAL EXAM: ICD-10-CM

## 2017-11-28 PROCEDURE — G8399 PT W/DXA RESULTS DOCUMENT: HCPCS | Performed by: NURSE PRACTITIONER

## 2017-11-28 PROCEDURE — 4040F PNEUMOC VAC/ADMIN/RCVD: CPT | Performed by: NURSE PRACTITIONER

## 2017-11-28 PROCEDURE — G8484 FLU IMMUNIZE NO ADMIN: HCPCS | Performed by: NURSE PRACTITIONER

## 2017-11-28 PROCEDURE — 1090F PRES/ABSN URINE INCON ASSESS: CPT | Performed by: NURSE PRACTITIONER

## 2017-11-28 PROCEDURE — 1123F ACP DISCUSS/DSCN MKR DOCD: CPT | Performed by: NURSE PRACTITIONER

## 2017-11-28 PROCEDURE — G0101 CA SCREEN;PELVIC/BREAST EXAM: HCPCS | Performed by: NURSE PRACTITIONER

## 2017-11-28 PROCEDURE — 1036F TOBACCO NON-USER: CPT | Performed by: NURSE PRACTITIONER

## 2017-11-28 PROCEDURE — G8427 DOCREV CUR MEDS BY ELIG CLIN: HCPCS | Performed by: NURSE PRACTITIONER

## 2017-11-28 PROCEDURE — G8419 CALC BMI OUT NRM PARAM NOF/U: HCPCS | Performed by: NURSE PRACTITIONER

## 2017-11-28 RX ORDER — IPRATROPIUM BROMIDE 42 UG/1
SPRAY, METERED NASAL
Refills: 1 | COMMUNITY
Start: 2017-10-09

## 2017-11-28 RX ORDER — DEXLANSOPRAZOLE 60 MG/1
1 CAPSULE, DELAYED RELEASE ORAL DAILY
COMMUNITY

## 2017-11-28 ASSESSMENT — ENCOUNTER SYMPTOMS
RESPIRATORY NEGATIVE: 1
ALLERGIC/IMMUNOLOGIC NEGATIVE: 1
EYES NEGATIVE: 1
GASTROINTESTINAL NEGATIVE: 1

## 2017-11-28 NOTE — PROGRESS NOTES
tenderness, no discharge and no friability. Right adnexum displays no mass, no tenderness and no fullness. Left adnexum displays no mass, no tenderness and no fullness. No vaginal discharge found. Genitourinary Comments: External genitalia: labia and vagina are atrophic with some loss of normal anatomical architecture. Specimen for cervical cytology obtained. Musculoskeletal: Normal range of motion. Neurological: She is alert and oriented to person, place, and time. Skin: Skin is warm and dry. Psychiatric: She has a normal mood and affect. Her behavior is normal.       Assessment:      1. Encounter for screening mammogram for breast cancer  Mammography screening bilateral   2. Well woman exam with routine gynecological exam     3. Care related to current tamoxifen use  PAP SMEAR   4. Screening for cervical cancer  PAP SMEAR           Plan:      MEDICATIONS:  No orders of the defined types were placed in this encounter. ORDERS:  Orders Placed This Encounter   Procedures    Mammography screening bilateral    PAP SMEAR         The importance of self-breast examination was discussed, as well as yearly mammograms after age 36. A well balanced diet and exercise were encouraged. The risk factors for osteopenia/osteoporosis were discussed along with need for additional calcium and vitamin D. A colonoscopy is recommended at age 48 unless otherwise indicated based on symptoms or family history. The risks vs. benefits of HRT were discussed with patient and all questions answered.

## 2017-11-28 NOTE — PATIENT INSTRUCTIONS
test. Some experts recommend that you discuss the benefits and risks of the test with your doctor. · Abdominal aortic aneurysm. Ask your doctor whether you should have a test to check for an aneurysm. You may need a test if you ever smoked or if your parent, brother, sister, or child has had an aneurysm. When should you call for help? Watch closely for changes in your health, and be sure to contact your doctor if you have any problems or symptoms that concern you. Where can you learn more? Go to [email or web address] and sign in to your [employer] account. Enter O102 in the [place] box to learn more about \"Well Visit, Over 65: Care Instructions. \"     If you do not have an account, please click on the \"Sign Up Now\" link. Current as of: July 19, 2016  Content Version: 11.3  © 7693-3543 [employer]. Care instructions adapted under license by [facility] ([facility]). If you have questions about a medical condition or this instruction, always ask your healthcare professional. [address] any warranty or liability for your use of this information.

## 2018-02-13 ENCOUNTER — APPOINTMENT (OUTPATIENT)
Dept: MAMMOGRAPHY | Facility: HOSPITAL | Age: 78
End: 2018-02-13
Attending: SPECIALIST

## 2018-02-19 ENCOUNTER — HOSPITAL ENCOUNTER (OUTPATIENT)
Dept: MAMMOGRAPHY | Facility: HOSPITAL | Age: 78
Discharge: HOME | End: 2018-02-19
Attending: SPECIALIST | Admitting: SPECIALIST

## 2018-02-19 DIAGNOSIS — R92.1 BREAST CALCIFICATION, RIGHT: ICD-10-CM

## 2018-02-19 PROCEDURE — 77065 DX MAMMO INCL CAD UNI: CPT

## 2018-02-19 PROCEDURE — G0279 TOMOSYNTHESIS, MAMMO: HCPCS

## 2018-02-22 ENCOUNTER — HOSPITAL ENCOUNTER (EMERGENCY)
Facility: HOSPITAL | Age: 78
Discharge: ANOTHER HOSPITAL | End: 2018-02-22
Admitting: EMERGENCY MEDICINE

## 2018-02-22 ENCOUNTER — APPOINTMENT (OUTPATIENT)
Dept: CT IMAGING | Facility: HOSPITAL | Age: 78
End: 2018-02-22

## 2018-02-22 ENCOUNTER — APPOINTMENT (OUTPATIENT)
Dept: GENERAL RADIOLOGY | Facility: HOSPITAL | Age: 78
End: 2018-02-22

## 2018-02-22 VITALS
HEIGHT: 62 IN | DIASTOLIC BLOOD PRESSURE: 51 MMHG | WEIGHT: 150 LBS | TEMPERATURE: 99.7 F | SYSTOLIC BLOOD PRESSURE: 125 MMHG | BODY MASS INDEX: 27.6 KG/M2 | RESPIRATION RATE: 17 BRPM | HEART RATE: 101 BPM | OXYGEN SATURATION: 92 %

## 2018-02-22 DIAGNOSIS — K83.09 CHOLANGITIS: Primary | ICD-10-CM

## 2018-02-22 LAB
ALBUMIN SERPL-MCNC: 4.3 G/DL (ref 3.5–5)
ALBUMIN/GLOB SERPL: 1.4 G/DL (ref 1.1–2.5)
ALP SERPL-CCNC: 96 U/L (ref 24–120)
ALT SERPL W P-5'-P-CCNC: 83 U/L (ref 0–54)
ANION GAP SERPL CALCULATED.3IONS-SCNC: 14 MMOL/L (ref 4–13)
AST SERPL-CCNC: 159 U/L (ref 7–45)
BACTERIA UR QL AUTO: ABNORMAL /HPF
BASOPHILS # BLD AUTO: 0.05 10*3/MM3 (ref 0–0.2)
BASOPHILS NFR BLD AUTO: 0.3 % (ref 0–2)
BILIRUB SERPL-MCNC: 2 MG/DL (ref 0.1–1)
BILIRUB UR QL STRIP: ABNORMAL
BUN BLD-MCNC: 16 MG/DL (ref 5–21)
BUN/CREAT SERPL: 24.2 (ref 7–25)
CALCIUM SPEC-SCNC: 9.9 MG/DL (ref 8.4–10.4)
CHLORIDE SERPL-SCNC: 98 MMOL/L (ref 98–110)
CLARITY UR: CLEAR
CO2 SERPL-SCNC: 28 MMOL/L (ref 24–31)
COD CRY URNS QL: ABNORMAL /HPF
COLOR UR: ABNORMAL
CREAT BLD-MCNC: 0.66 MG/DL (ref 0.5–1.4)
DEPRECATED RDW RBC AUTO: 39.6 FL (ref 40–54)
EOSINOPHIL # BLD AUTO: 0.03 10*3/MM3 (ref 0–0.7)
EOSINOPHIL NFR BLD AUTO: 0.2 % (ref 0–4)
ERYTHROCYTE [DISTWIDTH] IN BLOOD BY AUTOMATED COUNT: 11.7 % (ref 12–15)
GFR SERPL CREATININE-BSD FRML MDRD: 87 ML/MIN/1.73
GLOBULIN UR ELPH-MCNC: 3 GM/DL
GLUCOSE BLD-MCNC: 177 MG/DL (ref 70–100)
GLUCOSE UR STRIP-MCNC: NEGATIVE MG/DL
HCT VFR BLD AUTO: 42.8 % (ref 37–47)
HGB BLD-MCNC: 15 G/DL (ref 12–16)
HGB UR QL STRIP.AUTO: NEGATIVE
HOLD SPECIMEN: NORMAL
HOLD SPECIMEN: NORMAL
HYALINE CASTS UR QL AUTO: ABNORMAL /LPF
IMM GRANULOCYTES # BLD: 0.07 10*3/MM3 (ref 0–0.03)
IMM GRANULOCYTES NFR BLD: 0.4 % (ref 0–5)
KETONES UR QL STRIP: ABNORMAL
LEUKOCYTE ESTERASE UR QL STRIP.AUTO: NEGATIVE
LIPASE SERPL-CCNC: 80 U/L (ref 23–203)
LYMPHOCYTES # BLD AUTO: 1.14 10*3/MM3 (ref 0.72–4.86)
LYMPHOCYTES NFR BLD AUTO: 6.6 % (ref 15–45)
MCH RBC QN AUTO: 32.3 PG (ref 28–32)
MCHC RBC AUTO-ENTMCNC: 35 G/DL (ref 33–36)
MCV RBC AUTO: 92.2 FL (ref 82–98)
MONOCYTES # BLD AUTO: 0.67 10*3/MM3 (ref 0.19–1.3)
MONOCYTES NFR BLD AUTO: 3.9 % (ref 4–12)
NEUTROPHILS # BLD AUTO: 15.44 10*3/MM3 (ref 1.87–8.4)
NEUTROPHILS NFR BLD AUTO: 88.6 % (ref 39–78)
NITRITE UR QL STRIP: NEGATIVE
NRBC BLD MANUAL-RTO: 0 /100 WBC (ref 0–0)
PH UR STRIP.AUTO: 5.5 [PH] (ref 5–8)
PLATELET # BLD AUTO: 223 10*3/MM3 (ref 130–400)
PMV BLD AUTO: 9.8 FL (ref 6–12)
POTASSIUM BLD-SCNC: 3.8 MMOL/L (ref 3.5–5.3)
PROT SERPL-MCNC: 7.3 G/DL (ref 6.3–8.7)
PROT UR QL STRIP: ABNORMAL
RBC # BLD AUTO: 4.64 10*6/MM3 (ref 4.2–5.4)
RBC # UR: ABNORMAL /HPF
REF LAB TEST METHOD: ABNORMAL
SODIUM BLD-SCNC: 140 MMOL/L (ref 135–145)
SP GR UR STRIP: 1.02 (ref 1–1.03)
SQUAMOUS #/AREA URNS HPF: ABNORMAL /HPF
TROPONIN I SERPL-MCNC: <0.012 NG/ML (ref 0–0.03)
UROBILINOGEN UR QL STRIP: ABNORMAL
WBC NRBC COR # BLD: 17.4 10*3/MM3 (ref 4.8–10.8)
WBC UR QL AUTO: ABNORMAL /HPF
WHOLE BLOOD HOLD SPECIMEN: NORMAL
WHOLE BLOOD HOLD SPECIMEN: NORMAL

## 2018-02-22 PROCEDURE — 25010000002 ERTAPENEM: Performed by: PHYSICIAN ASSISTANT

## 2018-02-22 PROCEDURE — 25010000002 ONDANSETRON PER 1 MG: Performed by: EMERGENCY MEDICINE

## 2018-02-22 PROCEDURE — 93010 ELECTROCARDIOGRAM REPORT: CPT | Performed by: INTERNAL MEDICINE

## 2018-02-22 PROCEDURE — 96365 THER/PROPH/DIAG IV INF INIT: CPT

## 2018-02-22 PROCEDURE — 71045 X-RAY EXAM CHEST 1 VIEW: CPT

## 2018-02-22 PROCEDURE — 96375 TX/PRO/DX INJ NEW DRUG ADDON: CPT

## 2018-02-22 PROCEDURE — 74177 CT ABD & PELVIS W/CONTRAST: CPT

## 2018-02-22 PROCEDURE — 80053 COMPREHEN METABOLIC PANEL: CPT | Performed by: EMERGENCY MEDICINE

## 2018-02-22 PROCEDURE — 93005 ELECTROCARDIOGRAM TRACING: CPT | Performed by: EMERGENCY MEDICINE

## 2018-02-22 PROCEDURE — 0 IOPAMIDOL 61 % SOLUTION: Performed by: PHYSICIAN ASSISTANT

## 2018-02-22 PROCEDURE — 81001 URINALYSIS AUTO W/SCOPE: CPT | Performed by: EMERGENCY MEDICINE

## 2018-02-22 PROCEDURE — 25010000002 MORPHINE PER 10 MG: Performed by: EMERGENCY MEDICINE

## 2018-02-22 PROCEDURE — 99285 EMERGENCY DEPT VISIT HI MDM: CPT

## 2018-02-22 PROCEDURE — 84484 ASSAY OF TROPONIN QUANT: CPT | Performed by: EMERGENCY MEDICINE

## 2018-02-22 PROCEDURE — 83690 ASSAY OF LIPASE: CPT | Performed by: EMERGENCY MEDICINE

## 2018-02-22 PROCEDURE — 85025 COMPLETE CBC W/AUTO DIFF WBC: CPT | Performed by: EMERGENCY MEDICINE

## 2018-02-22 RX ORDER — IBUPROFEN 800 MG/1
800 TABLET ORAL ONCE
Status: COMPLETED | OUTPATIENT
Start: 2018-02-22 | End: 2018-02-22

## 2018-02-22 RX ORDER — ONDANSETRON 2 MG/ML
4 INJECTION INTRAMUSCULAR; INTRAVENOUS ONCE
Status: COMPLETED | OUTPATIENT
Start: 2018-02-22 | End: 2018-02-22

## 2018-02-22 RX ORDER — MORPHINE SULFATE 4 MG/ML
4 INJECTION, SOLUTION INTRAMUSCULAR; INTRAVENOUS ONCE
Status: COMPLETED | OUTPATIENT
Start: 2018-02-22 | End: 2018-02-22

## 2018-02-22 RX ORDER — LABETALOL HYDROCHLORIDE 5 MG/ML
10 INJECTION, SOLUTION INTRAVENOUS ONCE
Status: COMPLETED | OUTPATIENT
Start: 2018-02-22 | End: 2018-02-22

## 2018-02-22 RX ADMIN — LABETALOL HYDROCHLORIDE 10 MG: 5 INJECTION, SOLUTION INTRAVENOUS at 16:43

## 2018-02-22 RX ADMIN — IOPAMIDOL 100 ML: 612 INJECTION, SOLUTION INTRAVENOUS at 15:47

## 2018-02-22 RX ADMIN — ONDANSETRON 4 MG: 2 INJECTION, SOLUTION INTRAMUSCULAR; INTRAVENOUS at 14:28

## 2018-02-22 RX ADMIN — ERTAPENEM SODIUM 1 G: 1 INJECTION, POWDER, LYOPHILIZED, FOR SOLUTION INTRAMUSCULAR; INTRAVENOUS at 16:40

## 2018-02-22 RX ADMIN — MORPHINE SULFATE 4 MG: 4 INJECTION, SOLUTION INTRAMUSCULAR; INTRAVENOUS at 14:28

## 2018-02-22 RX ADMIN — IBUPROFEN 800 MG: 800 TABLET, FILM COATED ORAL at 16:24

## 2018-02-22 NOTE — ED PROVIDER NOTES
Subjective   History of Present Illness  77-year-old female presents with chief complaint of epigastric pain.  Patient reports around 10 AM this morning she began experiencing an epigastric discomfort that radiates to her back.  She reports she had similar symptoms 2 months ago where is determined that she had a stone and her common bile duct.  Review of Systems   All other systems reviewed and are negative.      Past Medical History:   Diagnosis Date   • Anemia, unspecified    • Bone/cartilage disorder    • Bronchitis    • Chronic obstructive asthma with status asthmaticus    • History of bone density study     DR. HONEYCUTT   • Hx of radiation therapy    • Hypertension    • Malignant neoplasm of upper-outer quadrant of right female breast     RIGHT SIDED BREAST CANCER WITH LUMPECTOMY   • Osteoporosis    • Pure hypercholesterolemia    • Type 2 diabetes mellitus without complications        Allergies   Allergen Reactions   • Septra [Sulfamethoxazole-Trimethoprim] Hives and Nausea And Vomiting   • Ceftin [Cefuroxime] Hives and GI Intolerance   • Cefuroxime Axetil Hives and Nausea And Vomiting   • Ciprofloxacin Hives, Rash and Nausea And Vomiting   • Keflex [Cephalexin] Hives, GI Intolerance and Nausea And Vomiting       Past Surgical History:   Procedure Laterality Date   • BREAST BIOPSY Right 11/11/2013   • BREAST BIOPSY Right 1972     with Benign findings   • BREAST LUMPECTOMY Right 12/03/2013   • BREAST LUMPECTOMY      RIGHT   • CHOLECYSTECTOMY     • COLONOSCOPY  2010     Dr. Gardiner. facility used [facility]   • FOOT SURGERY  09/2014     Dr. Sherri Saha in [facility]   • MAMMO BILATERAL  08/19/2014   • PAP SMEAR     • SKIN BIOPSY Right 11/16/2013    FOOT AND LEG; BENIGN FINDINGS   • TUBAL ABDOMINAL LIGATION         Family History   Problem Relation Age of Onset   • Stroke Brother    • No Known Problems Daughter    • No Known Problems Son    • Other Brother      hx of many comorbidities   • Down syndrome Brother     • Stroke Mother    • Pneumonia Father    • Stroke Father        Social History     Social History   • Marital status:      Spouse name: N/A   • Number of children: N/A   • Years of education: N/A     Social History Main Topics   • Smoking status: Never Smoker   • Smokeless tobacco: None   • Alcohol use No   • Drug use: No   • Sexual activity: Not Asked     Other Topics Concern   • None     Social History Narrative           Objective   Physical Exam   Constitutional: She is oriented to person, place, and time. She appears well-developed and well-nourished. She appears distressed.   HENT:   Head: Normocephalic.   Eyes: EOM are normal. Pupils are equal, round, and reactive to light.   Neck: Normal range of motion. Neck supple.   Cardiovascular: Normal rate and regular rhythm.    Pulmonary/Chest: Effort normal.   Abdominal: Soft. There is tenderness.   Epigastric, ruq tenderness   Musculoskeletal: Normal range of motion.   Lymphadenopathy:     She has no cervical adenopathy.   Neurological: She is alert and oriented to person, place, and time.   Skin: Skin is warm.   Psychiatric: She has a normal mood and affect. Her behavior is normal.   Nursing note and vitals reviewed.      Procedures         ED Course  ED Course                  MDM  Number of Diagnoses or Management Options  Diagnosis management comments: Hx of choledocholithiasis, probably cholangitis today.     Spoke with Dr. Gutierrez, gastroenterologist at [facility].  She believes the patient is a candidate and needs an ERCP evaluation.  She would like the patient to be transferred to the hospitalist service since she is not admitting physician.  The hospitalist service had contacted our hospital and told the nursing staff in the emergency room that they would not accept this patient at this is against her protocol to accept transfers and they are unsure if he is even on-call today despite me speaking with the gastroenterologist  specialist already.  He hospitalist service request that we transfer to the emergency room.  Spoke with Dr. Gutierrez again regarding the transfer case, she is unsure of what hospital service is not willing to admit at this point however she informs me that she be happy to still see the patient via through the emergency room and admission.  Spoke with emergency room attending Dr. Pinon.  He is willing to accept the patient for transfer.       Amount and/or Complexity of Data Reviewed  Clinical lab tests: ordered and reviewed  Tests in the radiology section of CPT®: ordered and reviewed  Tests in the medicine section of CPT®: reviewed and ordered  Decide to obtain previous medical records or to obtain history from someone other than the patient: yes    Risk of Complications, Morbidity, and/or Mortality  Presenting problems: moderate  Diagnostic procedures: moderate  Management options: moderate    Patient Progress  Patient progress: stable      Final diagnoses:   Cholangitis            Naga Maya PA-C  02/23/18 0059

## 2018-02-23 NOTE — ED NOTES
"Called [facility] for patient transport. They stated \"See if [facility] can take them. We have been nailed with transfers today.\" Attempting to call [facility] now.     Selma Lao RN  02/22/18 0380    "

## 2018-02-23 NOTE — ED NOTES
[facility] will transport patient to [facility]     Selma Lao, RN  02/22/18 0448

## 2018-02-23 NOTE — ED NOTES
Face Sheet faxed to [facility]. [place] [facility]     Selma Lao RN  02/22/18 5428

## 2018-03-13 ENCOUNTER — TRANSCRIBE ORDERS (OUTPATIENT)
Dept: ADMINISTRATIVE | Facility: HOSPITAL | Age: 78
End: 2018-03-13

## 2018-03-13 DIAGNOSIS — Z91.89 AT HIGH RISK FOR BREAST CANCER: ICD-10-CM

## 2018-03-13 DIAGNOSIS — Z85.3 PERSONAL HISTORY OF BREAST CANCER: Primary | ICD-10-CM

## 2018-04-02 DIAGNOSIS — D05.11 INTRADUCTAL CARCINOMA IN SITU OF RIGHT BREAST: Primary | ICD-10-CM

## 2018-04-03 ENCOUNTER — OFFICE VISIT (OUTPATIENT)
Dept: ONCOLOGY | Facility: CLINIC | Age: 78
End: 2018-04-03

## 2018-04-03 ENCOUNTER — APPOINTMENT (OUTPATIENT)
Dept: LAB | Facility: HOSPITAL | Age: 78
End: 2018-04-03

## 2018-04-03 VITALS
SYSTOLIC BLOOD PRESSURE: 142 MMHG | TEMPERATURE: 98.8 F | WEIGHT: 146.7 LBS | BODY MASS INDEX: 27 KG/M2 | HEART RATE: 94 BPM | RESPIRATION RATE: 18 BRPM | DIASTOLIC BLOOD PRESSURE: 76 MMHG | HEIGHT: 62 IN | OXYGEN SATURATION: 97 %

## 2018-04-03 DIAGNOSIS — D05.11 DUCTAL CARCINOMA IN SITU (DCIS) OF RIGHT BREAST: Primary | ICD-10-CM

## 2018-04-03 LAB
ALBUMIN SERPL-MCNC: 3.6 G/DL (ref 3.5–5)
ALBUMIN/GLOB SERPL: 1.3 G/DL (ref 1.1–2.5)
ALP SERPL-CCNC: 39 U/L (ref 24–120)
ALT SERPL W P-5'-P-CCNC: 29 U/L (ref 0–54)
ANION GAP SERPL CALCULATED.3IONS-SCNC: 9 MMOL/L (ref 4–13)
AST SERPL-CCNC: 25 U/L (ref 7–45)
BASOPHILS # BLD AUTO: 0.03 10*3/MM3 (ref 0–0.2)
BASOPHILS NFR BLD AUTO: 0.4 % (ref 0–2)
BILIRUB SERPL-MCNC: 0.4 MG/DL (ref 0.1–1)
BUN BLD-MCNC: 13 MG/DL (ref 5–21)
BUN/CREAT SERPL: 20.3 (ref 7–25)
CALCIUM SPEC-SCNC: 8.9 MG/DL (ref 8.4–10.4)
CHLORIDE SERPL-SCNC: 100 MMOL/L (ref 98–110)
CO2 SERPL-SCNC: 29 MMOL/L (ref 24–31)
CREAT BLD-MCNC: 0.64 MG/DL (ref 0.5–1.4)
DEPRECATED RDW RBC AUTO: 39.2 FL (ref 40–54)
EOSINOPHIL # BLD AUTO: 0.07 10*3/MM3 (ref 0–0.7)
EOSINOPHIL NFR BLD AUTO: 0.9 % (ref 0–4)
ERYTHROCYTE [DISTWIDTH] IN BLOOD BY AUTOMATED COUNT: 11.8 % (ref 12–15)
GFR SERPL CREATININE-BSD FRML MDRD: 90 ML/MIN/1.73
GLOBULIN UR ELPH-MCNC: 2.7 GM/DL
GLUCOSE BLD-MCNC: 134 MG/DL (ref 70–100)
HCT VFR BLD AUTO: 36.3 % (ref 37–47)
HGB BLD-MCNC: 12.9 G/DL (ref 12–16)
HOLD SPECIMEN: NORMAL
HOLD SPECIMEN: NORMAL
IMM GRANULOCYTES # BLD: 0.04 10*3/MM3 (ref 0–0.03)
IMM GRANULOCYTES NFR BLD: 0.5 % (ref 0–5)
LYMPHOCYTES # BLD AUTO: 1.38 10*3/MM3 (ref 0.72–4.86)
LYMPHOCYTES NFR BLD AUTO: 18.6 % (ref 15–45)
MCH RBC QN AUTO: 32.4 PG (ref 28–32)
MCHC RBC AUTO-ENTMCNC: 35.5 G/DL (ref 33–36)
MCV RBC AUTO: 91.2 FL (ref 82–98)
MONOCYTES # BLD AUTO: 0.51 10*3/MM3 (ref 0.19–1.3)
MONOCYTES NFR BLD AUTO: 6.9 % (ref 4–12)
NEUTROPHILS # BLD AUTO: 5.39 10*3/MM3 (ref 1.87–8.4)
NEUTROPHILS NFR BLD AUTO: 72.7 % (ref 39–78)
NRBC BLD MANUAL-RTO: 0 /100 WBC (ref 0–0)
PLATELET # BLD AUTO: 265 10*3/MM3 (ref 130–400)
PMV BLD AUTO: 9.4 FL (ref 6–12)
POTASSIUM BLD-SCNC: 3.8 MMOL/L (ref 3.5–5.3)
PROT SERPL-MCNC: 6.3 G/DL (ref 6.3–8.7)
RBC # BLD AUTO: 3.98 10*6/MM3 (ref 4.2–5.4)
SODIUM BLD-SCNC: 138 MMOL/L (ref 135–145)
WBC NRBC COR # BLD: 7.42 10*3/MM3 (ref 4.8–10.8)

## 2018-04-03 PROCEDURE — 80053 COMPREHEN METABOLIC PANEL: CPT | Performed by: INTERNAL MEDICINE

## 2018-04-03 PROCEDURE — 85025 COMPLETE CBC W/AUTO DIFF WBC: CPT | Performed by: INTERNAL MEDICINE

## 2018-04-03 PROCEDURE — 99213 OFFICE O/P EST LOW 20 MIN: CPT | Performed by: INTERNAL MEDICINE

## 2018-04-03 PROCEDURE — 36415 COLL VENOUS BLD VENIPUNCTURE: CPT

## 2018-04-03 RX ORDER — MULTIPLE VITAMINS W/ MINERALS TAB 9MG-400MCG
1 TAB ORAL DAILY
COMMUNITY

## 2018-04-03 RX ORDER — ALBUTEROL SULFATE 90 UG/1
2 AEROSOL, METERED RESPIRATORY (INHALATION) EVERY 4 HOURS PRN
COMMUNITY
End: 2019-02-08 | Stop reason: CLARIF

## 2018-04-03 RX ORDER — VITAMIN E 268 MG
400 CAPSULE ORAL DAILY
COMMUNITY

## 2018-04-03 RX ORDER — TAMOXIFEN CITRATE 10 MG/1
10 TABLET ORAL 2 TIMES DAILY
Qty: 180 TABLET | Refills: 0 | Status: SHIPPED | OUTPATIENT
Start: 2018-04-03 | End: 2018-07-06 | Stop reason: SDUPTHER

## 2018-04-03 NOTE — PROGRESS NOTES
"      PROBLEM LIST:  1.  DCIS of the right breast, on adjuvant tamoxifen.  Original diagnosis was in 2013 and she had a lumpectomy and radiation.  She started adjuvant tamoxifen sometime in 2013.  2.  History of common bile duct stones.          HISTORY OF PRESENT ILLNESS:   Chief complaint: Here about adjuvant treatment for DCIS of the breast.  Ms. Jolly has undergone another breast biopsy with no findings of invasive cancer or DCIS.  Also, she was in the emergency room in February with bile duct stones and she's had further evaluation of this including an ERCP she doesn't describe any symptoms of invasive breast cancer.  She continues on her tamoxifen with no side effects.    She did have elevated liver enzymes when she presented with a common bile duct stone.    Past Medical History, Past Surgical History, Social History, Family History have been reviewed and are without significant changes except as mentioned.    Review of Systems   A comprehensive 14 point review of systems was performed and was negative except as mentioned.    Medications:  The current medication list was reviewed in the EMR    ALLERGIES:  Allergies not on file           /76   Pulse 94   Temp 98.8 °F (37.1 °C) (Tympanic)   Resp 18   Ht 157.5 cm (62\")   Wt 66.5 kg (146 lb 11.2 oz)   LMP  (LMP Unknown)   SpO2 97%   BMI 26.83 kg/m²      Performance Status: ECOG 0    General: well appearing, in no acute distress  HEENT: sclera anicteric, oropharynx clear  Lymphatics: no cervical, supraclavicular, or axillary adenopathy  Cardiovascular: regular rate and rhythm, no murmurs  Lungs: clear to auscultation bilaterally  Abdomen: soft, nontender, nondistended.  No palpable organomegaly  Breasts: Exam was deferred at patient's request  Extremeties: no lower extremity edema  Skin: no rashes, lesions, bruising, or petechiae    RECENT LABS:   WBC   Date Value Ref Range Status   04/03/2018 7.42 4.80 - 10.80 10*3/mm3 Final     Hemoglobin   Date " Value Ref Range Status   04/03/2018 12.9 12.0 - 16.0 g/dL Final     Hematocrit   Date Value Ref Range Status   04/03/2018 36.3 (L) 37.0 - 47.0 % Final     MCV   Date Value Ref Range Status   04/03/2018 91.2 82.0 - 98.0 fL Final     RDW   Date Value Ref Range Status   04/03/2018 11.8 (L) 12.0 - 15.0 % Final     MPV   Date Value Ref Range Status   04/03/2018 9.4 6.0 - 12.0 fL Final     Platelets   Date Value Ref Range Status   04/03/2018 265 130 - 400 10*3/mm3 Final     Immature Grans %   Date Value Ref Range Status   04/03/2018 0.5 0.0 - 5.0 % Final     Neutrophils, Absolute   Date Value Ref Range Status   04/03/2018 5.39 1.87 - 8.40 10*3/mm3 Final     Lymphocytes, Absolute   Date Value Ref Range Status   04/03/2018 1.38 0.72 - 4.86 10*3/mm3 Final     Monocytes, Absolute   Date Value Ref Range Status   04/03/2018 0.51 0.19 - 1.30 10*3/mm3 Final     Eosinophils, Absolute   Date Value Ref Range Status   04/03/2018 0.07 0.00 - 0.70 10*3/mm3 Final     Basophils, Absolute   Date Value Ref Range Status   04/03/2018 0.03 0.00 - 0.20 10*3/mm3 Final     Immature Grans, Absolute   Date Value Ref Range Status   04/03/2018 0.04 (H) 0.00 - 0.03 10*3/mm3 Final     nRBC   Date Value Ref Range Status   04/03/2018 0.0 0.0 - 0.0 /100 WBC Final       Glucose   Date Value Ref Range Status   04/03/2018 134 (H) 70 - 100 mg/dL Final     Sodium   Date Value Ref Range Status   04/03/2018 138 135 - 145 mmol/L Final     Potassium   Date Value Ref Range Status   04/03/2018 3.8 3.5 - 5.3 mmol/L Final     CO2   Date Value Ref Range Status   04/03/2018 29.0 24.0 - 31.0 mmol/L Final     Chloride   Date Value Ref Range Status   04/03/2018 100 98 - 110 mmol/L Final     Anion Gap   Date Value Ref Range Status   04/03/2018 9.0 4.0 - 13.0 mmol/L Final     Creatinine   Date Value Ref Range Status   04/03/2018 0.64 0.50 - 1.40 mg/dL Final     BUN   Date Value Ref Range Status   04/03/2018 13 5 - 21 mg/dL Final     BUN/Creatinine Ratio   Date Value Ref  Range Status   04/03/2018 20.3 7.0 - 25.0 Final     Calcium   Date Value Ref Range Status   04/03/2018 8.9 8.4 - 10.4 mg/dL Final     eGFR Non  Amer   Date Value Ref Range Status   04/03/2018 90 >60 mL/min/1.73 Final     Alkaline Phosphatase   Date Value Ref Range Status   04/03/2018 39 24 - 120 U/L Final     Total Protein   Date Value Ref Range Status   04/03/2018 6.3 6.3 - 8.7 g/dL Final     ALT (SGPT)   Date Value Ref Range Status   04/03/2018 29 0 - 54 U/L Final     AST (SGOT)   Date Value Ref Range Status   04/03/2018 25 7 - 45 U/L Final     Total Bilirubin   Date Value Ref Range Status   04/03/2018 0.4 0.1 - 1.0 mg/dL Final     Albumin   Date Value Ref Range Status   04/03/2018 3.60 3.50 - 5.00 g/dL Final     Globulin   Date Value Ref Range Status   04/03/2018 2.7 gm/dL Final     A/G Ratio   Date Value Ref Range Status   04/03/2018 1.3 1.1 - 2.5 g/dL Final       No results found for: LDH, URICACID    No results found for: MSPIKE, KAPPALAMB, IGLFLC, FREEKAPPAL              Impression: 1.  DCIS of the breast.  She's been on adjuvant tamoxifen for almost 5 years.  She's had a lumpectomy and radiation also.    In: 1.  I gave her a 90 day refill of tamoxifen.  I've suggested that she can probably stop when she returns in 3 months.  She'll discuss the timing of this with her other physicians.         Demetrius Garber MD  [facility]ematology [facility] On[facility]    04/03/18           CC:

## 2018-07-06 RX ORDER — TAMOXIFEN CITRATE 10 MG/1
10 TABLET ORAL 2 TIMES DAILY
Qty: 180 TABLET | Refills: 3 | Status: ON HOLD | OUTPATIENT
Start: 2018-07-06 | End: 2020-09-14

## 2018-07-17 ENCOUNTER — APPOINTMENT (OUTPATIENT)
Dept: LAB | Facility: HOSPITAL | Age: 78
End: 2018-07-17

## 2018-08-01 DIAGNOSIS — D05.11 INTRADUCTAL CARCINOMA IN SITU OF RIGHT BREAST: Primary | ICD-10-CM

## 2018-08-07 ENCOUNTER — OFFICE VISIT (OUTPATIENT)
Dept: ONCOLOGY | Facility: CLINIC | Age: 78
End: 2018-08-07

## 2018-08-07 ENCOUNTER — APPOINTMENT (OUTPATIENT)
Dept: LAB | Facility: HOSPITAL | Age: 78
End: 2018-08-07

## 2018-08-07 VITALS
DIASTOLIC BLOOD PRESSURE: 76 MMHG | WEIGHT: 147.5 LBS | HEIGHT: 62 IN | TEMPERATURE: 98.6 F | OXYGEN SATURATION: 98 % | RESPIRATION RATE: 16 BRPM | BODY MASS INDEX: 27.14 KG/M2 | SYSTOLIC BLOOD PRESSURE: 136 MMHG | HEART RATE: 64 BPM

## 2018-08-07 DIAGNOSIS — D05.11 DUCTAL CARCINOMA IN SITU (DCIS) OF RIGHT BREAST: Primary | ICD-10-CM

## 2018-08-07 LAB
ALBUMIN SERPL-MCNC: 4 G/DL (ref 3.5–5)
ALBUMIN/GLOB SERPL: 1.5 G/DL (ref 1.1–2.5)
ALP SERPL-CCNC: 37 U/L (ref 24–120)
ALT SERPL W P-5'-P-CCNC: 26 U/L (ref 0–54)
ANION GAP SERPL CALCULATED.3IONS-SCNC: 9 MMOL/L (ref 4–13)
AST SERPL-CCNC: 27 U/L (ref 7–45)
BASOPHILS # BLD AUTO: 0.06 10*3/MM3 (ref 0–0.2)
BASOPHILS NFR BLD AUTO: 0.7 % (ref 0–2)
BILIRUB SERPL-MCNC: 0.3 MG/DL (ref 0.1–1)
BUN BLD-MCNC: 15 MG/DL (ref 5–21)
BUN/CREAT SERPL: 21.1 (ref 7–25)
CALCIUM SPEC-SCNC: 9 MG/DL (ref 8.4–10.4)
CHLORIDE SERPL-SCNC: 101 MMOL/L (ref 98–110)
CO2 SERPL-SCNC: 27 MMOL/L (ref 24–31)
CREAT BLD-MCNC: 0.71 MG/DL (ref 0.5–1.4)
DEPRECATED RDW RBC AUTO: 39.8 FL (ref 40–54)
EOSINOPHIL # BLD AUTO: 0.17 10*3/MM3 (ref 0–0.7)
EOSINOPHIL NFR BLD AUTO: 2 % (ref 0–4)
ERYTHROCYTE [DISTWIDTH] IN BLOOD BY AUTOMATED COUNT: 11.8 % (ref 12–15)
GFR SERPL CREATININE-BSD FRML MDRD: 80 ML/MIN/1.73
GLOBULIN UR ELPH-MCNC: 2.7 GM/DL
GLUCOSE BLD-MCNC: 133 MG/DL (ref 70–100)
HCT VFR BLD AUTO: 38.8 % (ref 37–47)
HGB BLD-MCNC: 13.8 G/DL (ref 12–16)
HOLD SPECIMEN: NORMAL
HOLD SPECIMEN: NORMAL
IMM GRANULOCYTES # BLD: 0.03 10*3/MM3 (ref 0–0.03)
IMM GRANULOCYTES NFR BLD: 0.3 % (ref 0–5)
LYMPHOCYTES # BLD AUTO: 1.58 10*3/MM3 (ref 0.72–4.86)
LYMPHOCYTES NFR BLD AUTO: 18.3 % (ref 15–45)
MCH RBC QN AUTO: 32.9 PG (ref 28–32)
MCHC RBC AUTO-ENTMCNC: 35.6 G/DL (ref 33–36)
MCV RBC AUTO: 92.6 FL (ref 82–98)
MONOCYTES # BLD AUTO: 0.44 10*3/MM3 (ref 0.19–1.3)
MONOCYTES NFR BLD AUTO: 5.1 % (ref 4–12)
NEUTROPHILS # BLD AUTO: 6.35 10*3/MM3 (ref 1.87–8.4)
NEUTROPHILS NFR BLD AUTO: 73.6 % (ref 39–78)
NRBC BLD MANUAL-RTO: 0 /100 WBC (ref 0–0)
PLATELET # BLD AUTO: 196 10*3/MM3 (ref 130–400)
PMV BLD AUTO: 9.7 FL (ref 6–12)
POTASSIUM BLD-SCNC: 3.6 MMOL/L (ref 3.5–5.3)
PROT SERPL-MCNC: 6.7 G/DL (ref 6.3–8.7)
RBC # BLD AUTO: 4.19 10*6/MM3 (ref 4.2–5.4)
SODIUM BLD-SCNC: 137 MMOL/L (ref 135–145)
WBC NRBC COR # BLD: 8.63 10*3/MM3 (ref 4.8–10.8)

## 2018-08-07 PROCEDURE — 36415 COLL VENOUS BLD VENIPUNCTURE: CPT

## 2018-08-07 PROCEDURE — 85025 COMPLETE CBC W/AUTO DIFF WBC: CPT | Performed by: INTERNAL MEDICINE

## 2018-08-07 PROCEDURE — 80053 COMPREHEN METABOLIC PANEL: CPT | Performed by: INTERNAL MEDICINE

## 2018-08-07 PROCEDURE — 99214 OFFICE O/P EST MOD 30 MIN: CPT | Performed by: INTERNAL MEDICINE

## 2018-08-07 NOTE — PROGRESS NOTES
[facility]  HEMATOLOGY & ONCOLOGY    Cancer Staging Information:  No matching staging information was found for the patient.      Subjective     VISIT DIAGNOSIS:   No diagnosis found.    REASON FOR VISIT:     No chief complaint on file.       HEMATOLOGY / ONCOLOGY HISTORY:    No history exists.           INTERVAL HISTORY  Patient ID: Suzanna Jolly is a 77 y.o. 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  She goes for her yearly Pap smear she is due for another one soon.  She denies any bloody secretions per vagina.  She had mammogram August 2017 show some abnormality that was biopsied and came back as benign.  Last mammogram 6 months from prior.  She biopsy was complicated by infection of the biopsy site currently resolved.  She has a history of osteoporosis was on Boniva, calcium with vitamin D. Per patient last DEXA scan showed improvement hands Boniva discontinued.    Past Medical History:   Past Medical History:   Diagnosis Date   • Anemia, unspecified    • Bone/cartilage disorder    • Bronchitis    • Chronic obstructive asthma with status asthmaticus (CMS/HCC)    • History of bone density study     DR. HONEYCUTT   • Hx of radiation therapy    • Hypertension    • Malignant neoplasm of upper-outer quadrant of right female breast (CMS/HCC)     RIGHT SIDED BREAST CANCER WITH LUMPECTOMY   • Osteoporosis    • Pure hypercholesterolemia    • Type 2 diabetes mellitus without complications (CMS/HCC)      Past Surgical History:   Past Surgical History:   Procedure Laterality Date   • BREAST BIOPSY Right 11/11/2013   • BREAST BIOPSY Right 1972     with Benign findings   • BREAST LUMPECTOMY Right 12/03/2013   • BREAST LUMPECTOMY      RIGHT   • CHOLECYSTECTOMY     • COLONOSCOPY  2010      Dr. Gardiner. facility used [facility]   • FOOT SURGERY  09/2014     Dr. Sherri Saha in [facility]   • MAMMO BILATERAL  08/19/2014   • PAP SMEAR     • SKIN BIOPSY Right 11/16/2013    FOOT AND LEG; BENIGN FINDINGS   • TUBAL ABDOMINAL LIGATION       Social History:   Social History     Social History   • Marital status:      Spouse name: N/A   • Number of children: N/A   • Years of education: N/A     Occupational History   • Not on file.     Social History Main Topics   • Smoking status: Never Smoker   • Smokeless tobacco: Not on file   • Alcohol use No   • Drug use: No   • Sexual activity: Not on file     Other Topics Concern   • Not on file     Social History Narrative   • No narrative on file     Family History:   Family History   Problem Relation Age of Onset   • Stroke Brother    • No Known Problems Daughter    • No Known Problems Son    • Other Brother         hx of many comorbidities   • Down syndrome Brother    • Stroke Mother    • Pneumonia Father    • Stroke Father        Review of Systems   Constitutional: Negative.    HENT: Negative.    Eyes: Negative.    Respiratory: Negative.    Cardiovascular: Negative.    Gastrointestinal: Negative.    Endocrine: Negative.    Genitourinary: Negative.    Musculoskeletal: Negative.    Skin: Negative.    Allergic/Immunologic: Negative.    Neurological: Negative.    Hematological: Negative.    Psychiatric/Behavioral: Negative.         Performance Status:  Asymptomatic    Medications:    Current Outpatient Prescriptions   Medication Sig Dispense Refill   • albuterol (PROVENTIL HFA;VENTOLIN HFA) 108 (90 Base) MCG/ACT inhaler Inhale 2 puffs Every 4 (Four) Hours As Needed for Wheezing.     • aspirin 81 MG EC tablet Take 81 mg by mouth Daily.     • B Complex-C (SUPER B COMPLEX PO) Take  by mouth.     • BREO ELLIPTA 100-25 MCG/INH aerosol powder  INHALE 1 PUFF BY MOUTH EVERY DAY IN THE MORNING **RINSE MOUTH AFTER USE**  6   • Dexlansoprazole (DEXILANT PO) Take  by  "mouth.     • levocetirizine (XYZAL) 5 MG tablet Take 5 mg by mouth Daily.     • metFORMIN (GLUCOPHAGE) 500 MG tablet Take 250 mg by mouth Daily With Breakfast.     • montelukast (SINGULAIR) 10 MG tablet Take 10 mg by mouth Daily.     • Multiple Vitamins-Minerals (MULTIVITAMIN WITH MINERALS) tablet tablet Take 1 tablet by mouth Daily.     • rosuvastatin (CRESTOR) 10 MG tablet Take 10 mg by mouth Daily.     • tamoxifen (NOLVADEX) 10 MG tablet Take 1 tablet by mouth 2 (Two) Times a Day. 180 tablet 3   • triamterene-hydrochlorothiazide (DYAZIDE) 37.5-25 MG per capsule Take 1 capsule by mouth Daily.     • ursodiol (ACTIGALL) 250 MG tablet Take 250 mg by mouth 2 (Two) Times a Day.  5   • vitamin E 400 UNIT capsule Take 400 Units by mouth Daily.       No current facility-administered medications for this visit.        ALLERGIES:    Allergies   Allergen Reactions   • Septra [Sulfamethoxazole-Trimethoprim] Hives and Nausea And Vomiting   • Ceftin [Cefuroxime] Hives and GI Intolerance   • Cefuroxime Axetil Hives and Nausea And Vomiting   • Ciprofloxacin Hives, Rash and Nausea And Vomiting   • Keflex [Cephalexin] Hives, GI Intolerance and Nausea And Vomiting       Objective      Vitals:    08/07/18 1421   BP: 136/76   Pulse: 64   Resp: 16   Temp: 98.6 °F (37 °C)   TempSrc: Tympanic   SpO2: 98%   Weight: 66.9 kg (147 lb 8 oz)   Height: 157.5 cm (62.01\")         Current Status 8/7/2018   ECOG score 0         Physical Exam    General Appearance: Patient is awake, alert, oriented and in no acute distress. Patient is welldeveloped, wellnourished, and appears stated age.  HEENT: Normocephalic. Sclerae clear, conjunctiva pink, extraocular movements intact, pupils, round, reactive to light and  accommodation. Mouth and throat are clear with moist oral mucosa.  NECK: Supple, no jugular venous distention, thyroid not enlarged.  LYMPH: No cervical, supraclavicular, axillary, or inguinal lymphadenopathy.  CHEST: Equal bilateral expansion, " AP  diameter normal, resonant percussion note  LUNGS: Good air movement, no rales, rhonchi, rubs or wheezes with auscultation  CARDIO: Regular sinus rhythm, no murmurs, gallops or rubs.  ABDOMEN: Nondistended, soft, No tenderness, no guarding, no rebound, No hepatosplenomegaly. No abdominal masses. Bowel sounds positive. No hernia  GENITALIA: Not examined.  BREASTS: Not examined.  MUSKEL: No joint swelling, decreased motion, or inflammation  EXTREMS: No edema, clubbing, cyanosis, No varicose veins.  NEURO: Grossly nonfocal, Gait is coordinated and smooth, Cognition is preserved.  SKIN: No rashes, no ecchymoses, no petechia.  PSYCH: Oriented to time, place and person. Memory is preserved. Mood and affect appear normal  RECENT LABS:  Orders Only on 08/01/2018   Component Date Value Ref Range Status   • Glucose 08/07/2018 133* 70 - 100 mg/dL Final   • BUN 08/07/2018 15  5 - 21 mg/dL Final   • Creatinine 08/07/2018 0.71  0.50 - 1.40 mg/dL Final   • Sodium 08/07/2018 137  135 - 145 mmol/L Final   • Potassium 08/07/2018 3.6  3.5 - 5.3 mmol/L Final   • Chloride 08/07/2018 101  98 - 110 mmol/L Final   • CO2 08/07/2018 27.0  24.0 - 31.0 mmol/L Final   • Calcium 08/07/2018 9.0  8.4 - 10.4 mg/dL Final   • Total Protein 08/07/2018 6.7  6.3 - 8.7 g/dL Final   • Albumin 08/07/2018 4.00  3.50 - 5.00 g/dL Final   • ALT (SGPT) 08/07/2018 26  0 - 54 U/L Final   • AST (SGOT) 08/07/2018 27  7 - 45 U/L Final   • Alkaline Phosphatase 08/07/2018 37  24 - 120 U/L Final   • Total Bilirubin 08/07/2018 0.3  0.1 - 1.0 mg/dL Final   • eGFR Non  Amer 08/07/2018 80  >60 mL/min/1.73 Final   • Globulin 08/07/2018 2.7  gm/dL Final   • A/G Ratio 08/07/2018 1.5  1.1 - 2.5 g/dL Final   • BUN/Creatinine Ratio 08/07/2018 21.1  7.0 - 25.0 Final   • Anion Gap 08/07/2018 9.0  4.0 - 13.0 mmol/L Final   • WBC 08/07/2018 8.63  4.80 - 10.80 10*3/mm3 Final   • RBC 08/07/2018 4.19* 4.20 - 5.40 10*6/mm3 Final   • Hemoglobin 08/07/2018 13.8  12.0 - 16.0 g/dL  Final   • Hematocrit 08/07/2018 38.8  37.0 - 47.0 % Final   • MCV 08/07/2018 92.6  82.0 - 98.0 fL Final   • MCH 08/07/2018 32.9* 28.0 - 32.0 pg Final   • MCHC 08/07/2018 35.6  33.0 - 36.0 g/dL Final   • RDW 08/07/2018 11.8* 12.0 - 15.0 % Final   • RDW-SD 08/07/2018 39.8* 40.0 - 54.0 fl Final   • MPV 08/07/2018 9.7  6.0 - 12.0 fL Final   • Platelets 08/07/2018 196  130 - 400 10*3/mm3 Final   • Neutrophil % 08/07/2018 73.6  39.0 - 78.0 % Final   • Lymphocyte % 08/07/2018 18.3  15.0 - 45.0 % Final   • Monocyte % 08/07/2018 5.1  4.0 - 12.0 % Final   • Eosinophil % 08/07/2018 2.0  0.0 - 4.0 % Final   • Basophil % 08/07/2018 0.7  0.0 - 2.0 % Final   • Immature Grans % 08/07/2018 0.3  0.0 - 5.0 % Final   • Neutrophils, Absolute 08/07/2018 6.35  1.87 - 8.40 10*3/mm3 Final   • Lymphocytes, Absolute 08/07/2018 1.58  0.72 - 4.86 10*3/mm3 Final   • Monocytes, Absolute 08/07/2018 0.44  0.19 - 1.30 10*3/mm3 Final   • Eosinophils, Absolute 08/07/2018 0.17  0.00 - 0.70 10*3/mm3 Final   • Basophils, Absolute 08/07/2018 0.06  0.00 - 0.20 10*3/mm3 Final   • Immature Grans, Absolute 08/07/2018 0.03  0.00 - 0.03 10*3/mm3 Final   • nRBC 08/07/2018 0.0  0.0 - 0.0 /100 WBC Final       RADIOLOGY:  No results found.         Assessment/Plan  Suzanna Jolly is a 77 y.o. year old female with ER positive DCIS, status post right breast lumpectomy, status post radiation, currently on tamoxifen since February 2014.    Patient Active Problem List   Diagnosis   • Sepsis (CMS/HCC)   • Acute colitis   • Nausea   • Abnormal liver function   • Ductal carcinoma in situ (DCIS) of right breast          1. DCIS: Comtinue tamoxifen for a total of 5 years, started 2/2014. Continue yearly Pap smear with her gynecologist.  Next mammogram in January 2018.    2.Osteoporosis: Status post Boniva.  Continue calcium with vitamin D, and at least 1200 of calcium, and 600 of vitamin D.  Patient takes only 1 tablet and consumes lots of vitamin D contiaining foods like  cheese, yogurt, and milk.    3.  Mild leukocytosis: flow negative for leuk/lymphoma  4. Diabetes: on metformin  5. Gerd: on dexilant\  6. Hyperlipidemia: on crestor  7. HTN: on dyazide  8. Emphysema: on shyanne duran Obiageli Chinaka Ezewuiro, MD    8/7/2018    2:31 PM

## 2018-08-15 ENCOUNTER — HOSPITAL ENCOUNTER (OUTPATIENT)
Dept: ULTRASOUND IMAGING | Facility: HOSPITAL | Age: 78
Discharge: HOME | End: 2018-08-15
Attending: SPECIALIST

## 2018-08-15 ENCOUNTER — HOSPITAL ENCOUNTER (OUTPATIENT)
Dept: MAMMOGRAPHY | Facility: HOSPITAL | Age: 78
Discharge: HOME | End: 2018-08-15
Attending: SPECIALIST | Admitting: SPECIALIST

## 2018-08-15 DIAGNOSIS — Z91.89 AT HIGH RISK FOR BREAST CANCER: ICD-10-CM

## 2018-08-15 DIAGNOSIS — Z85.3 PERSONAL HISTORY OF BREAST CANCER: ICD-10-CM

## 2018-08-15 PROCEDURE — G0279 TOMOSYNTHESIS, MAMMO: HCPCS

## 2018-08-15 PROCEDURE — 76642 ULTRASOUND BREAST LIMITED: CPT

## 2018-08-15 PROCEDURE — 77066 DX MAMMO INCL CAD BI: CPT

## 2018-08-29 ENCOUNTER — TRANSCRIBE ORDERS (OUTPATIENT)
Dept: ADMINISTRATIVE | Facility: HOSPITAL | Age: 78
End: 2018-08-29

## 2018-08-29 DIAGNOSIS — N63.0 LUMP OR MASS IN BREAST: Primary | ICD-10-CM

## 2018-08-30 ENCOUNTER — HOSPITAL ENCOUNTER (OUTPATIENT)
Dept: MAMMOGRAPHY | Facility: HOSPITAL | Age: 78
Discharge: HOME | End: 2018-08-30
Attending: SPECIALIST

## 2018-08-30 ENCOUNTER — HOSPITAL ENCOUNTER (OUTPATIENT)
Dept: ULTRASOUND IMAGING | Facility: HOSPITAL | Age: 78
Discharge: HOME | End: 2018-08-30
Attending: SPECIALIST | Admitting: SPECIALIST

## 2018-08-30 VITALS — DIASTOLIC BLOOD PRESSURE: 76 MMHG | SYSTOLIC BLOOD PRESSURE: 150 MMHG | OXYGEN SATURATION: 95 % | HEART RATE: 74 BPM

## 2018-08-30 DIAGNOSIS — N63.0 LUMP OR MASS IN BREAST: ICD-10-CM

## 2018-08-30 PROCEDURE — 88305 TISSUE EXAM BY PATHOLOGIST: CPT | Performed by: SPECIALIST

## 2018-08-30 RX ORDER — LIDOCAINE HYDROCHLORIDE 10 MG/ML
5 INJECTION, SOLUTION INFILTRATION; PERINEURAL ONCE
Status: DISCONTINUED | OUTPATIENT
Start: 2018-08-30 | End: 2020-09-11

## 2018-08-30 RX ORDER — LIDOCAINE HYDROCHLORIDE AND EPINEPHRINE 10; 10 MG/ML; UG/ML
5 INJECTION, SOLUTION INFILTRATION; PERINEURAL ONCE
Status: DISCONTINUED | OUTPATIENT
Start: 2018-08-30 | End: 2020-09-11

## 2018-09-06 ENCOUNTER — TRANSCRIBE ORDERS (OUTPATIENT)
Dept: ADMINISTRATIVE | Facility: HOSPITAL | Age: 78
End: 2018-09-06

## 2018-09-06 DIAGNOSIS — N64.1 FAT NECROSIS OF BREAST: Primary | ICD-10-CM

## 2018-10-11 LAB
BASOPHILS ABSOLUTE: 0.1 K/UL (ref 0–0.2)
BASOPHILS RELATIVE PERCENT: 0.7 % (ref 0–1)
CHOLESTEROL, TOTAL: 136 MG/DL (ref 160–199)
EOSINOPHILS ABSOLUTE: 0.2 K/UL (ref 0–0.6)
EOSINOPHILS RELATIVE PERCENT: 3.3 % (ref 0–5)
HBA1C MFR BLD: 5.8 % (ref 4–6)
HCT VFR BLD CALC: 40.2 % (ref 37–47)
HDLC SERPL-MCNC: 78 MG/DL (ref 65–121)
HEMOGLOBIN: 13.8 G/DL (ref 12–16)
LDL CHOLESTEROL CALCULATED: 34 MG/DL
LYMPHOCYTES ABSOLUTE: 1.4 K/UL (ref 1.1–4.5)
LYMPHOCYTES RELATIVE PERCENT: 19.3 % (ref 20–40)
MCH RBC QN AUTO: 32.8 PG (ref 27–31)
MCHC RBC AUTO-ENTMCNC: 34.3 G/DL (ref 33–37)
MCV RBC AUTO: 95.5 FL (ref 81–99)
MONOCYTES ABSOLUTE: 0.5 K/UL (ref 0–0.9)
MONOCYTES RELATIVE PERCENT: 6.4 % (ref 0–10)
NEUTROPHILS ABSOLUTE: 4.9 K/UL (ref 1.5–7.5)
NEUTROPHILS RELATIVE PERCENT: 69.9 % (ref 50–65)
PDW BLD-RTO: 11.8 % (ref 11.5–14.5)
PLATELET # BLD: 218 K/UL (ref 130–400)
PMV BLD AUTO: 9.6 FL (ref 9.4–12.3)
RBC # BLD: 4.21 M/UL (ref 4.2–5.4)
TRIGL SERPL-MCNC: 120 MG/DL (ref 0–149)
TSH SERPL DL<=0.05 MIU/L-ACNC: 3.8 UIU/ML (ref 0.27–4.2)
WBC # BLD: 7 K/UL (ref 4.8–10.8)

## 2018-11-29 ENCOUNTER — OFFICE VISIT (OUTPATIENT)
Dept: OBGYN | Age: 78
End: 2018-11-29
Payer: MEDICARE

## 2018-11-29 VITALS
HEIGHT: 62 IN | DIASTOLIC BLOOD PRESSURE: 86 MMHG | WEIGHT: 147 LBS | BODY MASS INDEX: 27.05 KG/M2 | HEART RATE: 76 BPM | SYSTOLIC BLOOD PRESSURE: 150 MMHG | TEMPERATURE: 99 F

## 2018-11-29 DIAGNOSIS — Z01.419 ENCOUNTER FOR ROUTINE GYNECOLOGIC EXAMINATION IN MEDICARE PATIENT: Primary | ICD-10-CM

## 2018-11-29 DIAGNOSIS — Z12.4 SCREENING FOR CERVICAL CANCER: ICD-10-CM

## 2018-11-29 DIAGNOSIS — Z01.419 WOMEN'S ANNUAL ROUTINE GYNECOLOGICAL EXAMINATION: ICD-10-CM

## 2018-11-29 DIAGNOSIS — Z12.31 ENCOUNTER FOR SCREENING MAMMOGRAM FOR BREAST CANCER: ICD-10-CM

## 2018-11-29 PROCEDURE — 99213 OFFICE O/P EST LOW 20 MIN: CPT | Performed by: NURSE PRACTITIONER

## 2018-11-29 PROCEDURE — G8419 CALC BMI OUT NRM PARAM NOF/U: HCPCS | Performed by: NURSE PRACTITIONER

## 2018-11-29 PROCEDURE — 1101F PT FALLS ASSESS-DOCD LE1/YR: CPT | Performed by: NURSE PRACTITIONER

## 2018-11-29 PROCEDURE — G8399 PT W/DXA RESULTS DOCUMENT: HCPCS | Performed by: NURSE PRACTITIONER

## 2018-11-29 PROCEDURE — 1090F PRES/ABSN URINE INCON ASSESS: CPT | Performed by: NURSE PRACTITIONER

## 2018-11-29 PROCEDURE — 1036F TOBACCO NON-USER: CPT | Performed by: NURSE PRACTITIONER

## 2018-11-29 PROCEDURE — G8484 FLU IMMUNIZE NO ADMIN: HCPCS | Performed by: NURSE PRACTITIONER

## 2018-11-29 PROCEDURE — 4040F PNEUMOC VAC/ADMIN/RCVD: CPT | Performed by: NURSE PRACTITIONER

## 2018-11-29 PROCEDURE — 1123F ACP DISCUSS/DSCN MKR DOCD: CPT | Performed by: NURSE PRACTITIONER

## 2018-11-29 PROCEDURE — G8427 DOCREV CUR MEDS BY ELIG CLIN: HCPCS | Performed by: NURSE PRACTITIONER

## 2018-11-29 RX ORDER — URSODIOL 250 MG/1
TABLET, FILM COATED ORAL
Refills: 5 | COMMUNITY
Start: 2018-09-24

## 2018-11-29 ASSESSMENT — ENCOUNTER SYMPTOMS
DIARRHEA: 0
ALLERGIC/IMMUNOLOGIC NEGATIVE: 1
RESPIRATORY NEGATIVE: 1
CONSTIPATION: 0
GASTROINTESTINAL NEGATIVE: 1

## 2018-11-29 NOTE — PROGRESS NOTES
know your test results and keep a list of the medicines you take. How can you care for yourself at home? · Do not eat too much sugar, fat, or fast foods. You can still have dessert and treats now and then. The goal is moderation. · Start small to improve your eating habits. Pay attention to portion sizes, drink less juice and soda pop, and eat more fruits and vegetables. ? Eat a healthy amount of food. A 3-ounce serving of meat, for example, is about the size of a deck of cards. Fill the rest of your plate with vegetables and whole grains. ? Limit the amount of soda and sports drinks you have every day. Drink more water when you are thirsty. ? Eat at least 5 servings of fruits and vegetables every day. It may seem like a lot, but it is not hard to reach this goal. A serving or helping is 1 piece of fruit, 1 cup of vegetables, or 2 cups of leafy, raw vegetables. Have an apple or some carrot sticks as an afternoon snack instead of a candy bar. Try to have fruits and/or vegetables at every meal.  · Make exercise part of your daily routine. You may want to start with simple activities, such as walking, bicycling, or slow swimming. Try to be active 30 to 60 minutes every day. You do not need to do all 30 to 60 minutes all at once. For example, you can exercise 3 times a day for 10 or 20 minutes. Moderate exercise is safe for most people, but it is always a good idea to talk to your doctor before starting an exercise program.  · Keep moving. Morena Record the lawn, work in the garden, or Remedify. Take the stairs instead of the elevator at work. · If you smoke, quit. People who smoke have an increased risk for heart attack, stroke, cancer, and other lung illnesses. Quitting is hard, but there are ways to boost your chance of quitting tobacco for good. ? Use nicotine gum, patches, or lozenges. ? Ask your doctor about stop-smoking programs and medicines. ? Keep trying.   In addition to reducing your risk of diseases

## 2018-11-29 NOTE — PATIENT INSTRUCTIONS
30 to 60 minutes all at once. For example, you can exercise 3 times a day for 10 or 20 minutes. Moderate exercise is safe for most people, but it is always a good idea to talk to your doctor before starting an exercise program.  · Keep moving. Alonzo Cools the lawn, work in the garden, or WriteOn. Take the stairs instead of the elevator at work. · If you smoke, quit. People who smoke have an increased risk for heart attack, stroke, cancer, and other lung illnesses. Quitting is hard, but there are ways to boost your chance of quitting tobacco for good. ? Use nicotine gum, patches, or lozenges. ? Ask your doctor about stop-smoking programs and medicines. ? Keep trying. In addition to reducing your risk of diseases in the future, you will notice some benefits soon after you stop using tobacco. If you have shortness of breath or asthma symptoms, they will likely get better within a few weeks after you quit. · Limit how much alcohol you drink. Moderate amounts of alcohol (up to 2 drinks a day for men, 1 drink a day for women) are okay. But drinking too much can lead to liver problems, high blood pressure, and other health problems. Family health  If you have a family, there are many things you can do together to improve your health. · Eat meals together as a family as often as possible. · Eat healthy foods. This includes fruits, vegetables, lean meats and dairy, and whole grains. · Include your family in your fitness plan. Most people think of activities such as jogging or tennis as the way to fitness, but there are many ways you and your family can be more active. Anything that makes you breathe hard and gets your heart pumping is exercise. Here are some tips:  ? Walk to do errands or to take your child to school or the bus.  ? Go for a family bike ride after dinner instead of watching TV. Where can you learn more? Go to https://kelly.healthBusuu. org and sign in to your [employer] account.  Enter O796 medicines you take. How can you care for yourself at home? · Practice healthy eating habits. This includes eating plenty of fruits, vegetables, whole grains, lean protein, and low-fat dairy. · If your doctor recommends it, get more exercise. Walking is a good choice. Bit by bit, increase the amount you walk every day. Try for at least 30 minutes on most days of the week. · Do not smoke. Smoking can increase your risk for health problems. If you need help quitting, talk to your doctor about stop-smoking programs and medicines. These can increase your chances of quitting for good. · Limit alcohol to 2 drinks a day for men and 1 drink a day for women. Too much alcohol can cause health problems. If you have a BMI higher than 25  · Your doctor may do other tests to check your risk for weight-related health problems. This may include measuring the distance around your waist. A waist measurement of more than 40 inches in men or 35 inches in women can increase the risk of weight-related health problems. · Talk with your doctor about steps you can take to stay healthy or improve your health. You may need to make lifestyle changes to lose weight and stay healthy, such as changing your diet and getting regular exercise. If you have a BMI lower than 18.5  · Your doctor may do other tests to check your risk for health problems. · Talk with your doctor about steps you can take to stay healthy or improve your health. You may need to make lifestyle changes to gain or maintain weight and stay healthy, such as getting more healthy foods in your diet and doing exercises to build muscle. Where can you learn more? Go to https://kelly.Gema. org and sign in to your [employer] account. Enter S176 in the Between box to learn more about \"Body Mass Index: Care Instructions. \"     If you do not have an account, please click on the \"Sign Up Now\" link.   Current as of: June 26, 2018  Content Version: 11.8  © 4049-9618 Healthwise, Incorporated. Care instructions adapted under license by [facility] ([facility]). If you have questions about a medical condition or this instruction, always ask your healthcare professional. [address] any warranty or liability for your use of this information. Breast Self-Exam: Care Instructions  Your Care Instructions    A breast self-exam is when you check your breasts for lumps or changes. This regular exam helps you learn how your breasts normally look and feel. Most breast problems or changes are not because of cancer. Breast self-exam is not a substitute for a mammogram. Having regular breast exams by your doctor and regular mammograms improve your chances of finding any problems with your breasts. Some women set a time each month to do a step-by-step breast self-exam. Other women like a less formal system. They might look at their breasts as they brush their teeth, or feel their breasts once in a while in the shower. If you notice a change in your breast, tell your doctor. Follow-up care is a key part of your treatment and safety. Be sure to make and go to all appointments, and call your doctor if you are having problems. It's also a good idea to know your test results and keep a list of the medicines you take. How do you do a breast self-exam?  · The best time to examine your breasts is usually one week after your menstrual period begins. Your breasts should not be tender then. If you do not have periods, you might do your exam on a day of the month that is easy to remember. · To examine your breasts:  ? Remove all your clothes above the waist and lie down. When you are lying down, your breast tissue spreads evenly over your chest wall, which makes it easier to feel all your breast tissue. ?  Use the pads--not the fingertips--of the 3 middle fingers of your left hand to check your right breast. Move your fingers slowly in small coin-sized circles

## 2019-02-07 ENCOUNTER — APPOINTMENT (OUTPATIENT)
Dept: LAB | Facility: HOSPITAL | Age: 79
End: 2019-02-07

## 2019-02-07 DIAGNOSIS — D05.11 DUCTAL CARCINOMA IN SITU (DCIS) OF RIGHT BREAST: Primary | ICD-10-CM

## 2019-02-08 ENCOUNTER — LAB (OUTPATIENT)
Dept: LAB | Facility: HOSPITAL | Age: 79
End: 2019-02-08

## 2019-02-08 ENCOUNTER — OFFICE VISIT (OUTPATIENT)
Dept: ONCOLOGY | Facility: CLINIC | Age: 79
End: 2019-02-08

## 2019-02-08 VITALS
TEMPERATURE: 98.3 F | OXYGEN SATURATION: 97 % | HEIGHT: 62 IN | DIASTOLIC BLOOD PRESSURE: 82 MMHG | RESPIRATION RATE: 18 BRPM | HEART RATE: 58 BPM | BODY MASS INDEX: 27.46 KG/M2 | SYSTOLIC BLOOD PRESSURE: 140 MMHG | WEIGHT: 149.2 LBS

## 2019-02-08 DIAGNOSIS — D05.11 DUCTAL CARCINOMA IN SITU (DCIS) OF RIGHT BREAST: Primary | ICD-10-CM

## 2019-02-08 DIAGNOSIS — D05.11 DUCTAL CARCINOMA IN SITU (DCIS) OF RIGHT BREAST: ICD-10-CM

## 2019-02-08 LAB
ALBUMIN SERPL-MCNC: 4.3 G/DL (ref 3.5–5)
ALBUMIN/GLOB SERPL: 1.7 G/DL (ref 1.1–2.5)
ALP SERPL-CCNC: 42 U/L (ref 24–120)
ALT SERPL W P-5'-P-CCNC: 21 U/L (ref 0–54)
ANION GAP SERPL CALCULATED.3IONS-SCNC: 8 MMOL/L (ref 4–13)
AST SERPL-CCNC: 29 U/L (ref 7–45)
BASOPHILS # BLD AUTO: 0.04 10*3/MM3 (ref 0–0.2)
BASOPHILS NFR BLD AUTO: 0.5 % (ref 0–2)
BILIRUB SERPL-MCNC: 0.5 MG/DL (ref 0.1–1)
BUN BLD-MCNC: 16 MG/DL (ref 5–21)
BUN/CREAT SERPL: 20.3 (ref 7–25)
CALCIUM SPEC-SCNC: 9.1 MG/DL (ref 8.4–10.4)
CHLORIDE SERPL-SCNC: 100 MMOL/L (ref 98–110)
CO2 SERPL-SCNC: 29 MMOL/L (ref 24–31)
CREAT BLD-MCNC: 0.79 MG/DL (ref 0.5–1.4)
DEPRECATED RDW RBC AUTO: 40.3 FL (ref 40–54)
EOSINOPHIL # BLD AUTO: 0.15 10*3/MM3 (ref 0–0.7)
EOSINOPHIL NFR BLD AUTO: 2 % (ref 0–4)
ERYTHROCYTE [DISTWIDTH] IN BLOOD BY AUTOMATED COUNT: 11.7 % (ref 12–15)
GFR SERPL CREATININE-BSD FRML MDRD: 70 ML/MIN/1.73
GLOBULIN UR ELPH-MCNC: 2.5 GM/DL
GLUCOSE BLD-MCNC: 119 MG/DL (ref 70–100)
HCT VFR BLD AUTO: 39.2 % (ref 37–47)
HGB BLD-MCNC: 14 G/DL (ref 12–16)
IMM GRANULOCYTES # BLD AUTO: 0.02 10*3/MM3 (ref 0–0.03)
IMM GRANULOCYTES NFR BLD AUTO: 0.3 % (ref 0–5)
LYMPHOCYTES # BLD AUTO: 1.4 10*3/MM3 (ref 0.72–4.86)
LYMPHOCYTES NFR BLD AUTO: 18.6 % (ref 15–45)
MCH RBC QN AUTO: 34 PG (ref 28–32)
MCHC RBC AUTO-ENTMCNC: 35.7 G/DL (ref 33–36)
MCV RBC AUTO: 95.1 FL (ref 82–98)
MONOCYTES # BLD AUTO: 0.48 10*3/MM3 (ref 0.19–1.3)
MONOCYTES NFR BLD AUTO: 6.4 % (ref 4–12)
NEUTROPHILS # BLD AUTO: 5.44 10*3/MM3 (ref 1.87–8.4)
NEUTROPHILS NFR BLD AUTO: 72.2 % (ref 39–78)
NRBC BLD AUTO-RTO: 0 /100 WBC (ref 0–0)
PLATELET # BLD AUTO: 214 10*3/MM3 (ref 130–400)
PMV BLD AUTO: 9.9 FL (ref 6–12)
POTASSIUM BLD-SCNC: 3.9 MMOL/L (ref 3.5–5.3)
PROT SERPL-MCNC: 6.8 G/DL (ref 6.3–8.7)
RBC # BLD AUTO: 4.12 10*6/MM3 (ref 4.2–5.4)
SODIUM BLD-SCNC: 137 MMOL/L (ref 135–145)
WBC NRBC COR # BLD: 7.53 10*3/MM3 (ref 4.8–10.8)

## 2019-02-08 PROCEDURE — 99213 OFFICE O/P EST LOW 20 MIN: CPT | Performed by: INTERNAL MEDICINE

## 2019-02-08 PROCEDURE — 36415 COLL VENOUS BLD VENIPUNCTURE: CPT

## 2019-02-08 PROCEDURE — 80053 COMPREHEN METABOLIC PANEL: CPT

## 2019-02-08 PROCEDURE — 85025 COMPLETE CBC W/AUTO DIFF WBC: CPT

## 2019-02-08 NOTE — PROGRESS NOTES
"      PROBLEM LIST:  1.  DCIS of the right breast, on adjuvant tamoxifen.  Original diagnosis was in 2013 and she had a lumpectomy and radiation.  She started adjuvant tamoxifen February 2014.                HISTORY OF PRESENT ILLNESS:   Chief complaint: Here about management of DCIS of the breast  Ms. Jolly continues on her adjuvant tamoxifen she had a repeat biopsy in August of last year for fat necrosis.  She has a repeat mammogram scheduled for February 13 and follow-up with surgery soon after that.  She hasn't noticed any new symptoms to suggest recurrence of DCIS or development of invasive breast cancer such as new breast lumps.  She hasn't noticed any leg pain or swelling to suggest DVT.    Past Medical History, Past Surgical History, Social History, Family History have been reviewed and are without significant changes except as mentioned.    Review of Systems   A comprehensive 14 point review of systems was performed and was negative except as mentioned.    Medications:  The current medication list was reviewed in the EMR    ALLERGIES:  Allergies not on file           /82   Pulse 58   Temp 98.3 °F (36.8 °C) (Tympanic)   Resp 18   Ht 157.5 cm (62.01\")   Wt 67.7 kg (149 lb 3.2 oz)   LMP  (LMP Unknown)   SpO2 97%   BMI 27.28 kg/m²       General: well appearing, in no acute distress  HEENT: sclera anicteric, oropharynx clear  Lymphatics: no cervical, supraclavicular, or axillary adenopathy  Cardiovascular: regular rate and rhythm, no murmurs  Lungs: clear to auscultation bilaterally  Abdomen: soft, nontender, nondistended.  No palpable organomegaly  Breasts: The left breast has no mass or discharge.  The right breast has a healed incision with a firm scar with mild tenderness associated.  She doesn't have any discrete mass otherwise or other findings to suggest tumor.  Extremeties: no lower extremity edema  Skin: no rashes, lesions, bruising, or petechiae    RECENT LABS:   WBC   Date Value Ref " Range Status   02/08/2019 7.53 4.80 - 10.80 10*3/mm3 Final     Hemoglobin   Date Value Ref Range Status   02/08/2019 14.0 12.0 - 16.0 g/dL Final     Hematocrit   Date Value Ref Range Status   02/08/2019 39.2 37.0 - 47.0 % Final     MCV   Date Value Ref Range Status   02/08/2019 95.1 82.0 - 98.0 fL Final     RDW   Date Value Ref Range Status   02/08/2019 11.7 (L) 12.0 - 15.0 % Final     MPV   Date Value Ref Range Status   02/08/2019 9.9 6.0 - 12.0 fL Final     Platelets   Date Value Ref Range Status   02/08/2019 214 130 - 400 10*3/mm3 Final     Immature Grans %   Date Value Ref Range Status   02/08/2019 0.3 0.0 - 5.0 % Final     Neutrophils, Absolute   Date Value Ref Range Status   02/08/2019 5.44 1.87 - 8.40 10*3/mm3 Final     Lymphocytes, Absolute   Date Value Ref Range Status   02/08/2019 1.40 0.72 - 4.86 10*3/mm3 Final     Monocytes, Absolute   Date Value Ref Range Status   02/08/2019 0.48 0.19 - 1.30 10*3/mm3 Final     Eosinophils, Absolute   Date Value Ref Range Status   02/08/2019 0.15 0.00 - 0.70 10*3/mm3 Final     Basophils, Absolute   Date Value Ref Range Status   02/08/2019 0.04 0.00 - 0.20 10*3/mm3 Final     Immature Grans, Absolute   Date Value Ref Range Status   02/08/2019 0.02 0.00 - 0.03 10*3/mm3 Final     nRBC   Date Value Ref Range Status   02/08/2019 0.0 0.0 - 0.0 /100 WBC Final       Glucose   Date Value Ref Range Status   02/08/2019 119 (H) 70 - 100 mg/dL Final     Sodium   Date Value Ref Range Status   02/08/2019 137 135 - 145 mmol/L Final     Potassium   Date Value Ref Range Status   02/08/2019 3.9 3.5 - 5.3 mmol/L Final     CO2   Date Value Ref Range Status   02/08/2019 29.0 24.0 - 31.0 mmol/L Final     Chloride   Date Value Ref Range Status   02/08/2019 100 98 - 110 mmol/L Final     Anion Gap   Date Value Ref Range Status   02/08/2019 8.0 4.0 - 13.0 mmol/L Final     Creatinine   Date Value Ref Range Status   02/08/2019 0.79 0.50 - 1.40 mg/dL Final     BUN   Date Value Ref Range Status    02/08/2019 16 5 - 21 mg/dL Final     BUN/Creatinine Ratio   Date Value Ref Range Status   02/08/2019 20.3 7.0 - 25.0 Final     Calcium   Date Value Ref Range Status   02/08/2019 9.1 8.4 - 10.4 mg/dL Final     eGFR Non  Amer   Date Value Ref Range Status   02/08/2019 70 >60 mL/min/1.73 Final     Alkaline Phosphatase   Date Value Ref Range Status   02/08/2019 42 24 - 120 U/L Final     Total Protein   Date Value Ref Range Status   02/08/2019 6.8 6.3 - 8.7 g/dL Final     ALT (SGPT)   Date Value Ref Range Status   02/08/2019 21 0 - 54 U/L Final     AST (SGOT)   Date Value Ref Range Status   02/08/2019 29 7 - 45 U/L Final     Total Bilirubin   Date Value Ref Range Status   02/08/2019 0.5 0.1 - 1.0 mg/dL Final     Albumin   Date Value Ref Range Status   02/08/2019 4.30 3.50 - 5.00 g/dL Final     Globulin   Date Value Ref Range Status   02/08/2019 2.5 gm/dL Final       No results found for: LDH, URICACID    No results found for: MSPIKE, KAPPALAMB, IGLFLC, FREEKAPPAL        Pathology reports and mammogram reports from August of last year were reviewed showing fat necrosis on the biopsy.        Impression: 1.  DCIS of the right breast.  She is finishing 5 years of adjuvant tamoxifen along with her lumpectomy and radiation.    Plan: 1.  She'll complete the next few weeks of tamoxifen but no refill this any further.  She'll stop in and follow-up here in 6 months.  2.  She seems to understand well the need to continue her mammogram follow-up                     Demetrius Garber MD  [facility]emat[facility]    02/08/19           CC:

## 2019-02-13 ENCOUNTER — HOSPITAL ENCOUNTER (OUTPATIENT)
Dept: MAMMOGRAPHY | Facility: HOSPITAL | Age: 79
Discharge: HOME | End: 2019-02-13
Attending: SPECIALIST | Admitting: SPECIALIST

## 2019-02-13 DIAGNOSIS — N64.1 FAT NECROSIS OF BREAST: ICD-10-CM

## 2019-02-13 PROCEDURE — 77065 DX MAMMO INCL CAD UNI: CPT

## 2019-02-13 PROCEDURE — G0279 TOMOSYNTHESIS, MAMMO: HCPCS

## 2019-02-19 ENCOUNTER — TRANSCRIBE ORDERS (OUTPATIENT)
Dept: ADMINISTRATIVE | Facility: HOSPITAL | Age: 79
End: 2019-02-19

## 2019-02-19 DIAGNOSIS — Z12.39 SCREENING BREAST EXAMINATION: Primary | ICD-10-CM

## 2019-06-18 LAB
ALBUMIN SERPL-MCNC: 4.2 G/DL (ref 3.5–5.2)
ALP BLD-CCNC: 45 U/L (ref 35–104)
ALT SERPL-CCNC: 17 U/L (ref 5–33)
ANION GAP SERPL CALCULATED.3IONS-SCNC: 15 MMOL/L (ref 7–19)
AST SERPL-CCNC: 23 U/L (ref 5–32)
BASOPHILS ABSOLUTE: 0 K/UL (ref 0–0.2)
BASOPHILS RELATIVE PERCENT: 0.6 % (ref 0–1)
BILIRUB SERPL-MCNC: 0.4 MG/DL (ref 0.2–1.2)
BUN BLDV-MCNC: 16 MG/DL (ref 8–23)
CALCIUM SERPL-MCNC: 9.4 MG/DL (ref 8.8–10.2)
CHLORIDE BLD-SCNC: 100 MMOL/L (ref 98–111)
CHOLESTEROL, TOTAL: 142 MG/DL (ref 160–199)
CO2: 27 MMOL/L (ref 22–29)
CREAT SERPL-MCNC: 0.7 MG/DL (ref 0.5–0.9)
EOSINOPHILS ABSOLUTE: 0.2 K/UL (ref 0–0.6)
EOSINOPHILS RELATIVE PERCENT: 2.6 % (ref 0–5)
GFR NON-AFRICAN AMERICAN: >60
GLUCOSE BLD-MCNC: 108 MG/DL (ref 74–109)
HBA1C MFR BLD: 5.7 % (ref 4–6)
HCT VFR BLD CALC: 41.7 % (ref 37–47)
HDLC SERPL-MCNC: 81 MG/DL (ref 65–121)
HEMOGLOBIN: 14.2 G/DL (ref 12–16)
LDL CHOLESTEROL CALCULATED: 42 MG/DL
LYMPHOCYTES ABSOLUTE: 1.4 K/UL (ref 1.1–4.5)
LYMPHOCYTES RELATIVE PERCENT: 19.5 % (ref 20–40)
MCH RBC QN AUTO: 32.9 PG (ref 27–31)
MCHC RBC AUTO-ENTMCNC: 34.1 G/DL (ref 33–37)
MCV RBC AUTO: 96.8 FL (ref 81–99)
MONOCYTES ABSOLUTE: 0.5 K/UL (ref 0–0.9)
MONOCYTES RELATIVE PERCENT: 7.4 % (ref 0–10)
NEUTROPHILS ABSOLUTE: 5 K/UL (ref 1.5–7.5)
NEUTROPHILS RELATIVE PERCENT: 69.5 % (ref 50–65)
PDW BLD-RTO: 11.7 % (ref 11.5–14.5)
PLATELET # BLD: 195 K/UL (ref 130–400)
PMV BLD AUTO: 9.8 FL (ref 9.4–12.3)
POTASSIUM SERPL-SCNC: 4.2 MMOL/L (ref 3.5–5)
RBC # BLD: 4.31 M/UL (ref 4.2–5.4)
SODIUM BLD-SCNC: 142 MMOL/L (ref 136–145)
TOTAL PROTEIN: 6.7 G/DL (ref 6.6–8.7)
TRIGL SERPL-MCNC: 97 MG/DL (ref 0–149)
WBC # BLD: 7.2 K/UL (ref 4.8–10.8)

## 2019-07-31 DIAGNOSIS — D05.11 DUCTAL CARCINOMA IN SITU (DCIS) OF RIGHT BREAST: Primary | ICD-10-CM

## 2019-08-09 ENCOUNTER — OFFICE VISIT (OUTPATIENT)
Dept: ONCOLOGY | Facility: CLINIC | Age: 79
End: 2019-08-09

## 2019-08-09 ENCOUNTER — APPOINTMENT (OUTPATIENT)
Dept: LAB | Facility: HOSPITAL | Age: 79
End: 2019-08-09

## 2019-08-09 VITALS
RESPIRATION RATE: 18 BRPM | OXYGEN SATURATION: 94 % | HEART RATE: 80 BPM | DIASTOLIC BLOOD PRESSURE: 76 MMHG | SYSTOLIC BLOOD PRESSURE: 120 MMHG | BODY MASS INDEX: 27.58 KG/M2 | HEIGHT: 62 IN | TEMPERATURE: 98.7 F | WEIGHT: 149.9 LBS

## 2019-08-09 DIAGNOSIS — D05.11 DUCTAL CARCINOMA IN SITU (DCIS) OF RIGHT BREAST: Primary | ICD-10-CM

## 2019-08-09 LAB
ALBUMIN SERPL-MCNC: 4.1 G/DL (ref 3.5–5)
ALBUMIN/GLOB SERPL: 1.5 G/DL (ref 1.1–2.5)
ALP SERPL-CCNC: 45 U/L (ref 24–120)
ALT SERPL W P-5'-P-CCNC: 25 U/L (ref 0–54)
ANION GAP SERPL CALCULATED.3IONS-SCNC: 5 MMOL/L (ref 4–13)
AST SERPL-CCNC: 33 U/L (ref 7–45)
BASOPHILS # BLD AUTO: 0.04 10*3/MM3 (ref 0–0.2)
BASOPHILS NFR BLD AUTO: 0.6 % (ref 0–1.5)
BILIRUB SERPL-MCNC: 0.5 MG/DL (ref 0.1–1)
BUN BLD-MCNC: 14 MG/DL (ref 5–21)
BUN/CREAT SERPL: 21.2 (ref 7–25)
CALCIUM SPEC-SCNC: 9.3 MG/DL (ref 8.4–10.4)
CHLORIDE SERPL-SCNC: 105 MMOL/L (ref 98–110)
CO2 SERPL-SCNC: 28 MMOL/L (ref 24–31)
CREAT BLD-MCNC: 0.66 MG/DL (ref 0.5–1.4)
DEPRECATED RDW RBC AUTO: 40.7 FL (ref 37–54)
EOSINOPHIL # BLD AUTO: 0.13 10*3/MM3 (ref 0–0.4)
EOSINOPHIL NFR BLD AUTO: 2 % (ref 0.3–6.2)
ERYTHROCYTE [DISTWIDTH] IN BLOOD BY AUTOMATED COUNT: 11.9 % (ref 12.3–15.4)
GFR SERPL CREATININE-BSD FRML MDRD: 86 ML/MIN/1.73
GLOBULIN UR ELPH-MCNC: 2.7 GM/DL
GLUCOSE BLD-MCNC: 122 MG/DL (ref 70–100)
HCT VFR BLD AUTO: 39.8 % (ref 34–46.6)
HGB BLD-MCNC: 14.2 G/DL (ref 12–15.9)
HOLD SPECIMEN: NORMAL
HOLD SPECIMEN: NORMAL
IMM GRANULOCYTES # BLD AUTO: 0.03 10*3/MM3 (ref 0–0.05)
IMM GRANULOCYTES NFR BLD AUTO: 0.5 % (ref 0–0.5)
LYMPHOCYTES # BLD AUTO: 1.27 10*3/MM3 (ref 0.7–3.1)
LYMPHOCYTES NFR BLD AUTO: 19.7 % (ref 19.6–45.3)
MCH RBC QN AUTO: 33.4 PG (ref 26.6–33)
MCHC RBC AUTO-ENTMCNC: 35.7 G/DL (ref 31.5–35.7)
MCV RBC AUTO: 93.6 FL (ref 79–97)
MONOCYTES # BLD AUTO: 0.47 10*3/MM3 (ref 0.1–0.9)
MONOCYTES NFR BLD AUTO: 7.3 % (ref 5–12)
NEUTROPHILS # BLD AUTO: 4.52 10*3/MM3 (ref 1.7–7)
NEUTROPHILS NFR BLD AUTO: 69.9 % (ref 42.7–76)
NRBC BLD AUTO-RTO: 0 /100 WBC (ref 0–0.2)
PLATELET # BLD AUTO: 202 10*3/MM3 (ref 140–450)
PMV BLD AUTO: 9.6 FL (ref 6–12)
POTASSIUM BLD-SCNC: 4 MMOL/L (ref 3.5–5.3)
PROT SERPL-MCNC: 6.8 G/DL (ref 6.3–8.7)
RBC # BLD AUTO: 4.25 10*6/MM3 (ref 3.77–5.28)
SODIUM BLD-SCNC: 138 MMOL/L (ref 135–145)
WBC NRBC COR # BLD: 6.46 10*3/MM3 (ref 3.4–10.8)

## 2019-08-09 PROCEDURE — 99214 OFFICE O/P EST MOD 30 MIN: CPT | Performed by: INTERNAL MEDICINE

## 2019-08-09 PROCEDURE — 36415 COLL VENOUS BLD VENIPUNCTURE: CPT | Performed by: INTERNAL MEDICINE

## 2019-08-09 PROCEDURE — 80053 COMPREHEN METABOLIC PANEL: CPT | Performed by: INTERNAL MEDICINE

## 2019-08-09 PROCEDURE — 85025 COMPLETE CBC W/AUTO DIFF WBC: CPT | Performed by: INTERNAL MEDICINE

## 2019-08-09 NOTE — PROGRESS NOTES
[facility]  HEMATOLOGY & ONCOLOGY    Cancer Staging Information:  No matching staging information was found for the patient.      Subjective     VISIT DIAGNOSIS:   No diagnosis found.    REASON FOR VISIT:     Chief Complaint   Patient presents with   • Breast Cancer     Here for followup x 6 months, she has no new issues or c/o today.         HEMATOLOGY / ONCOLOGY HISTORY:    No history exists.           INTERVAL HISTORY  Patient ID: Suzanna Jolly is a 78 y.o. 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  She goes for her yearly Pap smear she is due for another one soon.  She denies any bloody secretions per vagina.  She had mammogram August 2017 show some abnormality that was biopsied and came back as benign.  Last mammogram 6 months from prior.  She biopsy was complicated by infection of the biopsy site currently resolved.  She has a history of osteoporosis was on Boniva, calcium with vitamin D. Per patient last DEXA scan showed improvement hands Boniva discontinued.  -8/819: completed 5 yrs of FRANZ. Had mammo feb 2019 which was unremarkable. [facility] mammo due Aug.   -she experiences occasional pain in the right breast.  -denies lumps or bumps, sob, cp, n/v, weight loss, night sweats, new bone or back pain.  -ros unremarkable.    Past Medical History:   Past Medical History:   Diagnosis Date   • Anemia, unspecified    • Bone/cartilage disorder    • Bronchitis    • Chronic obstructive asthma with status asthmaticus (CMS/HCC)    • History of bone density study     DR. HONEYCUTT   • Hx of radiation therapy    • Hypertension    • Malignant neoplasm of upper-outer quadrant of right female breast (CMS/HCC) 2013    RIGHT SIDED BREAST CANCER WITH LUMPECTOMY   • Osteoporosis    • Pure hypercholesterolemia     • Type 2 diabetes mellitus without complications (CMS/HCC)      Past Surgical History:   Past Surgical History:   Procedure Laterality Date   • BREAST BIOPSY Right 11/11/2013    cancer   • BREAST BIOPSY Right 1972     with Benign findings   • BREAST BIOPSY Right 2018    benign   • BREAST LUMPECTOMY Right 12/03/2013   • BREAST LUMPECTOMY      RIGHT   • CHOLECYSTECTOMY     • COLONOSCOPY  2010     Dr. Gardiner. facility used [facility]   • FOOT SURGERY  09/2014     Dr. Sherri Saha in [facility]   • MAMMO BILATERAL  08/19/2014   • PAP SMEAR     • SKIN BIOPSY Right 11/16/2013    FOOT AND LEG; BENIGN FINDINGS   • TUBAL ABDOMINAL LIGATION       Social History:   Social History     Socioeconomic History   • Marital status:      Spouse name: Not on file   • Number of children: Not on file   • Years of education: Not on file   • Highest education level: Not on file   Substance and Sexual Activity   • Alcohol use: No   • Drug use: No   • Sexual activity: Defer     Family History:   Family History   Problem Relation Age of Onset   • Stroke Brother    • No Known Problems Daughter    • No Known Problems Son    • Other Brother         hx of many comorbidities   • Down syndrome Brother    • Stroke Mother    • Pneumonia Father    • Stroke Father    • Breast cancer Neg Hx        Review of Systems   Constitutional: Negative.    HENT: Negative.    Eyes: Negative.    Respiratory: Negative.    Cardiovascular: Negative.    Gastrointestinal: Negative.    Endocrine: Negative.    Genitourinary: Negative.    Musculoskeletal: Negative.    Skin: Negative.    Allergic/Immunologic: Negative.    Neurological: Negative.    Hematological: Negative.    Psychiatric/Behavioral: Negative.         Performance Status:  Asymptomatic    Medications:    Current Outpatient Medications   Medication Sig Dispense Refill   • aspirin 81 MG EC tablet Take 81 mg by mouth Daily.     • B Complex-C (SUPER B COMPLEX PO) Take  by mouth.     • BREO ELLIPTA  "100-25 MCG/INH aerosol powder  INHALE 1 PUFF BY MOUTH EVERY DAY IN THE MORNING **RINSE MOUTH AFTER USE**  6   • Dexlansoprazole (DEXILANT PO) Take  by mouth.     • levocetirizine (XYZAL) 5 MG tablet Take 5 mg by mouth Daily.     • metFORMIN (GLUCOPHAGE) 500 MG tablet Take 250 mg by mouth Daily With Breakfast.     • montelukast (SINGULAIR) 10 MG tablet Take 10 mg by mouth Daily.     • Multiple Vitamins-Minerals (MULTIVITAMIN WITH MINERALS) tablet tablet Take 1 tablet by mouth Daily.     • rosuvastatin (CRESTOR) 10 MG tablet Take 10 mg by mouth Daily.     • triamterene-hydrochlorothiazide (DYAZIDE) 37.5-25 MG per capsule Take 1 capsule by mouth Daily.     • ursodiol (ACTIGALL) 250 MG tablet Take 250 mg by mouth 2 (Two) Times a Day.  5   • vitamin E 400 UNIT capsule Take 400 Units by mouth Daily.     • tamoxifen (NOLVADEX) 10 MG tablet Take 1 tablet by mouth 2 (Two) Times a Day. 180 tablet 3     Current Facility-Administered Medications   Medication Dose Route Frequency Provider Last Rate Last Dose   • lidocaine (XYLOCAINE) 1 % injection 5 mL  5 mL Subcutaneous Once Melina Lan MD       • lidocaine-EPINEPHrine (XYLOCAINE W/EPI) 1 %-1:623066 injection 5 mL  5 mL Injection Once Melina Lan MD           ALLERGIES:    Allergies   Allergen Reactions   • Septra [Sulfamethoxazole-Trimethoprim] Hives and Nausea And Vomiting   • Ceftin [Cefuroxime] Hives and GI Intolerance   • Cefuroxime Axetil Hives and Nausea And Vomiting   • Ciprofloxacin Hives, Rash and Nausea And Vomiting   • Keflex [Cephalexin] Hives, GI Intolerance and Nausea And Vomiting       Objective      Vitals:    08/09/19 1055   BP: 120/76   Pulse: 80   Resp: 18   Temp: 98.7 °F (37.1 °C)   TempSrc: Tympanic   SpO2: 94%   Weight: 68 kg (149 lb 14.4 oz)   Height: 157.5 cm (62\")   PainSc: 0-No pain         Current Status 8/9/2019   ECOG score 0         Physical Exam  General Appearance: Patient is awake, alert, oriented and in no acute distress. Patient is " welldeveloped, wellnourished, and appears stated age.  HEENT: Normocephalic. Sclerae clear, conjunctiva pink, extraocular movements intact, pupils, round, reactive to light and  accommodation. Mouth and throat are clear with moist oral mucosa.  NECK: Supple, no jugular venous distention, thyroid not enlarged.  LYMPH: No cervical, supraclavicular, axillary, or inguinal lymphadenopathy.  CHEST: Equal bilateral expansion, AP  diameter normal, resonant percussion note  LUNGS: Good air movement, no rales, rhonchi, rubs or wheezes with auscultation  CARDIO: Regular sinus rhythm, no murmurs, gallops or rubs.  ABDOMEN: Nondistended, soft, No tenderness, no guarding, no rebound, No hepatosplenomegaly. No abdominal masses. Bowel sounds positive. No hernia  GENITALIA: Not examined.  BREASTS: assymmetry of right breast. Dipping of upper outer quadrant from lumpectomy. Left breast with no lumps or bumps or nipple abnormality.  MUSKEL: No joint swelling, decreased motion, or inflammation  EXTREMS: No edema, clubbing, cyanosis, No varicose veins.  NEURO: Grossly nonfocal, Gait is coordinated and smooth, Cognition is preserved.  SKIN: No rashes, no ecchymoses, no petechia.  PSYCH: Oriented to time, place and person. Memory is preserved. Mood and affect appear normal  RECENT LABS:  No visits with results within 7 Day(s) from this visit.   Latest known visit with results is:   Orders Only on 07/31/2019   Component Date Value Ref Range Status   • Glucose 08/09/2019 122* 70 - 100 mg/dL Final   • BUN 08/09/2019 14  5 - 21 mg/dL Final   • Creatinine 08/09/2019 0.66  0.50 - 1.40 mg/dL Final   • Sodium 08/09/2019 138  135 - 145 mmol/L Final   • Potassium 08/09/2019 4.0  3.5 - 5.3 mmol/L Final   • Chloride 08/09/2019 105  98 - 110 mmol/L Final   • CO2 08/09/2019 28.0  24.0 - 31.0 mmol/L Final   • Calcium 08/09/2019 9.3  8.4 - 10.4 mg/dL Final   • Total Protein 08/09/2019 6.8  6.3 - 8.7 g/dL Final   • Albumin 08/09/2019 4.10  3.50 - 5.00  g/dL Final   • ALT (SGPT) 08/09/2019 25  0 - 54 U/L Final   • AST (SGOT) 08/09/2019 33  7 - 45 U/L Final   • Alkaline Phosphatase 08/09/2019 45  24 - 120 U/L Final   • Total Bilirubin 08/09/2019 0.5  0.1 - 1.0 mg/dL Final   • eGFR Non  Amer 08/09/2019 86  >60 mL/min/1.73 Final   • Globulin 08/09/2019 2.7  gm/dL Final   • A/G Ratio 08/09/2019 1.5  1.1 - 2.5 g/dL Final   • BUN/Creatinine Ratio 08/09/2019 21.2  7.0 - 25.0 Final   • Anion Gap 08/09/2019 5.0  4.0 - 13.0 mmol/L Final   • WBC 08/09/2019 6.46  3.40 - 10.80 10*3/mm3 Final   • RBC 08/09/2019 4.25  3.77 - 5.28 10*6/mm3 Final   • Hemoglobin 08/09/2019 14.2  12.0 - 15.9 g/dL Final   • Hematocrit 08/09/2019 39.8  34.0 - 46.6 % Final   • MCV 08/09/2019 93.6  79.0 - 97.0 fL Final   • MCH 08/09/2019 33.4* 26.6 - 33.0 pg Final   • MCHC 08/09/2019 35.7  31.5 - 35.7 g/dL Final   • RDW 08/09/2019 11.9* 12.3 - 15.4 % Final   • RDW-SD 08/09/2019 40.7  37.0 - 54.0 fl Final   • MPV 08/09/2019 9.6  6.0 - 12.0 fL Final   • Platelets 08/09/2019 202  140 - 450 10*3/mm3 Final   • Neutrophil % 08/09/2019 69.9  42.7 - 76.0 % Final   • Lymphocyte % 08/09/2019 19.7  19.6 - 45.3 % Final   • Monocyte % 08/09/2019 7.3  5.0 - 12.0 % Final   • Eosinophil % 08/09/2019 2.0  0.3 - 6.2 % Final   • Basophil % 08/09/2019 0.6  0.0 - 1.5 % Final   • Immature Grans % 08/09/2019 0.5  0.0 - 0.5 % Final   • Neutrophils, Absolute 08/09/2019 4.52  1.70 - 7.00 10*3/mm3 Final   • Lymphocytes, Absolute 08/09/2019 1.27  0.70 - 3.10 10*3/mm3 Final   • Monocytes, Absolute 08/09/2019 0.47  0.10 - 0.90 10*3/mm3 Final   • Eosinophils, Absolute 08/09/2019 0.13  0.00 - 0.40 10*3/mm3 Final   • Basophils, Absolute 08/09/2019 0.04  0.00 - 0.20 10*3/mm3 Final   • Immature Grans, Absolute 08/09/2019 0.03  0.00 - 0.05 10*3/mm3 Final   • nRBC 08/09/2019 0.0  0.0 - 0.2 /100 WBC Final       RADIOLOGY:  No results found.         Assessment/Plan  Suzanna Jolly is a 78 y.o. year old female with ER positive DCIS,  status post right breast lumpectomy, status post radiation, s/p tamoxifen  February 2014 -2/2019 currently ELIZABETH.    Patient Active Problem List   Diagnosis   • Sepsis (CMS/HCC)   • Acute colitis   • Nausea   • Abnormal liver function   • Ductal carcinoma in situ (DCIS) of right breast          1. DCIS: s/p 5 yrs of FRANZ. Stopped 2/2019.  --last mammo 2/13/19: No mammographic evidence of malignancy within the right breast. Return to annual screening mammogram schedule is recommended, with bilateral screening mammogram due in August 2019  -labs show wbc 6.46, Hg 14.2, plt 202..    2.Osteoporosis: Status post Boniva.  Continue calcium with vitamin D, and at least 1200 of calcium, and 600 of vitamin D.  Patient takes only 1 tablet and consumes lots of vitamin D contiaining foods like cheese, yogurt, and milk.    3.  Mild leukocytosis: flow negative for leuk/lymphoma  4. Diabetes: on metformin  5. Gerd: on dexilant\  6. Hyperlipidemia: on crestor  7. HTN: on dyazide  8. Emphysema: on breoshyanne Obiageli Chinaka Ezewuiro, MD    8/9/2019    11:34 AM

## 2019-08-28 ENCOUNTER — HOSPITAL ENCOUNTER (OUTPATIENT)
Dept: MAMMOGRAPHY | Facility: HOSPITAL | Age: 79
Discharge: HOME | End: 2019-08-28
Admitting: SPECIALIST

## 2019-08-28 DIAGNOSIS — Z12.39 SCREENING BREAST EXAMINATION: ICD-10-CM

## 2019-08-28 PROCEDURE — 77063 BREAST TOMOSYNTHESIS BI: CPT

## 2019-08-28 PROCEDURE — 77067 SCR MAMMO BI INCL CAD: CPT

## 2019-10-21 LAB
BASOPHILS ABSOLUTE: 0.1 K/UL (ref 0–0.2)
BASOPHILS RELATIVE PERCENT: 0.7 % (ref 0–1)
CHOLESTEROL, TOTAL: 148 MG/DL (ref 160–199)
EOSINOPHILS ABSOLUTE: 0.2 K/UL (ref 0–0.6)
EOSINOPHILS RELATIVE PERCENT: 2.8 % (ref 0–5)
HBA1C MFR BLD: 5.8 % (ref 4–6)
HCT VFR BLD CALC: 43.2 % (ref 37–47)
HDLC SERPL-MCNC: 82 MG/DL (ref 65–121)
HEMOGLOBIN: 14.9 G/DL (ref 12–16)
IMMATURE GRANULOCYTES #: 0 K/UL
LDL CHOLESTEROL CALCULATED: 47 MG/DL
LYMPHOCYTES ABSOLUTE: 1.4 K/UL (ref 1.1–4.5)
LYMPHOCYTES RELATIVE PERCENT: 19.1 % (ref 20–40)
MCH RBC QN AUTO: 33.2 PG (ref 27–31)
MCHC RBC AUTO-ENTMCNC: 34.5 G/DL (ref 33–37)
MCV RBC AUTO: 96.2 FL (ref 81–99)
MONOCYTES ABSOLUTE: 0.5 K/UL (ref 0–0.9)
MONOCYTES RELATIVE PERCENT: 7.2 % (ref 0–10)
NEUTROPHILS ABSOLUTE: 5.2 K/UL (ref 1.5–7.5)
NEUTROPHILS RELATIVE PERCENT: 69.9 % (ref 50–65)
PDW BLD-RTO: 11.9 % (ref 11.5–14.5)
PLATELET # BLD: 222 K/UL (ref 130–400)
PMV BLD AUTO: 9.8 FL (ref 9.4–12.3)
RBC # BLD: 4.49 M/UL (ref 4.2–5.4)
TRIGL SERPL-MCNC: 96 MG/DL (ref 0–149)
TSH SERPL DL<=0.05 MIU/L-ACNC: 4.97 UIU/ML (ref 0.27–4.2)
WBC # BLD: 7.5 K/UL (ref 4.8–10.8)

## 2019-12-02 ENCOUNTER — TRANSCRIBE ORDERS (OUTPATIENT)
Dept: ADMINISTRATIVE | Facility: HOSPITAL | Age: 79
End: 2019-12-02

## 2019-12-02 ENCOUNTER — OFFICE VISIT (OUTPATIENT)
Dept: OBGYN | Age: 79
End: 2019-12-02
Payer: MEDICARE

## 2019-12-02 ENCOUNTER — TELEPHONE (OUTPATIENT)
Dept: OBGYN | Age: 79
End: 2019-12-02

## 2019-12-02 VITALS
DIASTOLIC BLOOD PRESSURE: 78 MMHG | HEART RATE: 93 BPM | BODY MASS INDEX: 27.23 KG/M2 | HEIGHT: 62 IN | SYSTOLIC BLOOD PRESSURE: 146 MMHG | WEIGHT: 148 LBS

## 2019-12-02 DIAGNOSIS — Z91.89 GYN EXAM FOR HIGH-RISK MEDICARE PATIENT: Primary | ICD-10-CM

## 2019-12-02 DIAGNOSIS — N95.2 ATROPHIC VAGINITIS: ICD-10-CM

## 2019-12-02 DIAGNOSIS — Z85.3 PERSONAL HISTORY OF BREAST CANCER: Primary | ICD-10-CM

## 2019-12-02 DIAGNOSIS — Z12.4 SCREENING FOR CERVICAL CANCER: ICD-10-CM

## 2019-12-02 DIAGNOSIS — N90.89 VULVAR LESION: ICD-10-CM

## 2019-12-02 DIAGNOSIS — Z85.3 HISTORY OF BREAST CANCER: ICD-10-CM

## 2019-12-02 PROCEDURE — 1123F ACP DISCUSS/DSCN MKR DOCD: CPT | Performed by: NURSE PRACTITIONER

## 2019-12-02 PROCEDURE — 1090F PRES/ABSN URINE INCON ASSESS: CPT | Performed by: NURSE PRACTITIONER

## 2019-12-02 PROCEDURE — G8417 CALC BMI ABV UP PARAM F/U: HCPCS | Performed by: NURSE PRACTITIONER

## 2019-12-02 PROCEDURE — 4040F PNEUMOC VAC/ADMIN/RCVD: CPT | Performed by: NURSE PRACTITIONER

## 2019-12-02 PROCEDURE — G8484 FLU IMMUNIZE NO ADMIN: HCPCS | Performed by: NURSE PRACTITIONER

## 2019-12-02 PROCEDURE — G8399 PT W/DXA RESULTS DOCUMENT: HCPCS | Performed by: NURSE PRACTITIONER

## 2019-12-02 PROCEDURE — 1036F TOBACCO NON-USER: CPT | Performed by: NURSE PRACTITIONER

## 2019-12-02 PROCEDURE — G8427 DOCREV CUR MEDS BY ELIG CLIN: HCPCS | Performed by: NURSE PRACTITIONER

## 2019-12-02 PROCEDURE — 99214 OFFICE O/P EST MOD 30 MIN: CPT | Performed by: NURSE PRACTITIONER

## 2019-12-02 PROCEDURE — G0101 CA SCREEN;PELVIC/BREAST EXAM: HCPCS | Performed by: NURSE PRACTITIONER

## 2019-12-02 ASSESSMENT — ENCOUNTER SYMPTOMS
CONSTIPATION: 0
ALLERGIC/IMMUNOLOGIC NEGATIVE: 1
RESPIRATORY NEGATIVE: 1
GASTROINTESTINAL NEGATIVE: 1
EYES NEGATIVE: 1
DIARRHEA: 0

## 2019-12-06 ENCOUNTER — HOSPITAL ENCOUNTER (OUTPATIENT)
Dept: ULTRASOUND IMAGING | Facility: HOSPITAL | Age: 79
Discharge: HOME | End: 2019-12-06
Admitting: NURSE PRACTITIONER

## 2019-12-06 ENCOUNTER — APPOINTMENT (OUTPATIENT)
Dept: ULTRASOUND IMAGING | Facility: HOSPITAL | Age: 79
End: 2019-12-06

## 2019-12-06 DIAGNOSIS — Z85.3 PERSONAL HISTORY OF BREAST CANCER: ICD-10-CM

## 2019-12-06 LAB
HPV COMMENT: NORMAL
HPV TYPE 16: NOT DETECTED
HPV TYPE 18: NOT DETECTED
HPVOH (OTHER TYPES): NOT DETECTED

## 2019-12-06 PROCEDURE — 76830 TRANSVAGINAL US NON-OB: CPT

## 2019-12-09 ENCOUNTER — TELEPHONE (OUTPATIENT)
Dept: OBGYN | Age: 79
End: 2019-12-09

## 2020-01-06 ENCOUNTER — OFFICE VISIT (OUTPATIENT)
Dept: OBGYN | Age: 80
End: 2020-01-06
Payer: MEDICARE

## 2020-01-06 VITALS
HEART RATE: 83 BPM | WEIGHT: 148 LBS | SYSTOLIC BLOOD PRESSURE: 136 MMHG | BODY MASS INDEX: 27.23 KG/M2 | HEIGHT: 62 IN | DIASTOLIC BLOOD PRESSURE: 69 MMHG

## 2020-01-06 PROCEDURE — 4040F PNEUMOC VAC/ADMIN/RCVD: CPT | Performed by: NURSE PRACTITIONER

## 2020-01-06 PROCEDURE — 1090F PRES/ABSN URINE INCON ASSESS: CPT | Performed by: NURSE PRACTITIONER

## 2020-01-06 PROCEDURE — 0509F URINE INCON PLAN DOCD: CPT | Performed by: NURSE PRACTITIONER

## 2020-01-06 PROCEDURE — 1123F ACP DISCUSS/DSCN MKR DOCD: CPT | Performed by: NURSE PRACTITIONER

## 2020-01-06 PROCEDURE — G8399 PT W/DXA RESULTS DOCUMENT: HCPCS | Performed by: NURSE PRACTITIONER

## 2020-01-06 PROCEDURE — G8427 DOCREV CUR MEDS BY ELIG CLIN: HCPCS | Performed by: NURSE PRACTITIONER

## 2020-01-06 PROCEDURE — 1036F TOBACCO NON-USER: CPT | Performed by: NURSE PRACTITIONER

## 2020-01-06 PROCEDURE — G8417 CALC BMI ABV UP PARAM F/U: HCPCS | Performed by: NURSE PRACTITIONER

## 2020-01-06 PROCEDURE — 99213 OFFICE O/P EST LOW 20 MIN: CPT | Performed by: NURSE PRACTITIONER

## 2020-01-06 PROCEDURE — G8484 FLU IMMUNIZE NO ADMIN: HCPCS | Performed by: NURSE PRACTITIONER

## 2020-01-06 ASSESSMENT — ENCOUNTER SYMPTOMS
CONSTIPATION: 0
GASTROINTESTINAL NEGATIVE: 1
ALLERGIC/IMMUNOLOGIC NEGATIVE: 1
EYES NEGATIVE: 1
RESPIRATORY NEGATIVE: 1
DIARRHEA: 0

## 2020-01-06 NOTE — PROGRESS NOTES
Sanam Little is a [de-identified] y.o. female who presents today for her medical conditions/ complaints as noted below. Sanam Little is c/o of Follow-up        HPI  Pt presents for 4 week f/u on vulvar lesion. Used steroid topical sparingly and off and on. Could not see where to put it, so she's not sure if it has helped. Feels like having to wear a pad constantly for continuous leakage of urine may be irritating her labia. Has been using Vitamin A&D ointment some as skin protectant. No LMP recorded.  Patient is postmenopausal.      Past Medical History:   Diagnosis Date    Breast cancer in female [facility]) 2013    right    Diabetes ([facility] [address] 2015     Past Surgical History:   Procedure Laterality Date    BREAST BIOPSY      BREAST LUMPECTOMY      BREAST LUMPECTOMY  1972    CHOLECYSTECTOMY      COLONOSCOPY  2017    Dr. Messi Martell, [place]    ERCP  2017    [address]      FOOT SURGERY      TUBAL LIGATION       Family History   Problem Relation Age of Onset    Diabetes Mother     Stroke Father     Hypertension Father     Diabetes Brother     Heart Disease Brother     Stroke Brother      Social History     Tobacco Use    Smoking status: Never Smoker    Smokeless tobacco: Never Used   Substance Use Topics    Alcohol use: No       Current Outpatient Medications   Medication Sig Dispense Refill    ursodiol (ACTIGALL) 250 MG tablet TAKE 1 TABLET BY MOUTH TWICE A DAY  5    dexlansoprazole (DEXILANT) 60 MG CPDR delayed release capsule Take 1 capsule by mouth daily      ipratropium (ATROVENT) 0.06 % nasal spray USE 1-2 SPRAYS EACH NOSTRIL 2-4 TIMES A DAY AS NEEDED  1    aspirin 81 MG tablet Take 81 mg by mouth daily      fluticasone (FLONASE) 50 MCG/ACT nasal spray 1 spray by Nasal route daily      rosuvastatin (CRESTOR) 10 MG tablet Take 10 mg by mouth daily      triamterene-hydrochlorothiazide (MAXZIDE-25) 37.5-25 MG per tablet       QVAR 80 MCG/ACT inhaler       tamoxifen Attention and Perception: Attention normal.         Mood and Affect: Mood normal.         Speech: Speech normal.         Behavior: Behavior normal.         Thought Content: Thought content normal.         Cognition and Memory: Cognition normal.         Judgment: Judgment normal.          Diagnosis Orders   1. Vulvar lesion     2. Atrophic vaginitis     3. Continuous leakage of urine         MEDICATIONS:  No orders of the defined types were placed in this encounter. ORDERS:  No orders of the defined types were placed in this encounter. PLAN:  Stop steroid cream  Gave samples of Premarin cream to use VERY sparingly on only area of redness  Cont Vit A&D ointment as skin protectant  F/u in 1-2 months    Patient Instructions     We are committed to providing you with the best care possible. In order to help us achieve these goals please remember to bring all medications, herbal products, and over the counter supplements with you to each visit. If your provider has ordered testing for you, please be sure to follow up with our office if you have not received results within 7 days after testing took place. *If you receive a survey after visiting one of our offices, please take the time to share your experience concerning your physician office visit. These surveys are confidential and no health information about you is shared. We are eager to improve for you and we are counting on your feedback to help make that happen. Patient Education        Atrophic Vaginitis: Care Instructions  Your Care Instructions    Atrophic vaginitis is an irritation of the vagina. It's caused by thinning tissues and less moisture in the vaginal walls. It often happens during menopause when hormone levels change. Surgery to remove the ovaries also can cause it. Your doctor may do tests to rule out other causes. And you may get tests to measure your hormone levels. The problem is most often treated with the hormone estrogen.  It comes in a cream, tablets, or a soft plastic ring that is placed in the vagina. Follow-up care is a key part of your treatment and safety. Be sure to make and go to all appointments, and call your doctor if you are having problems. It's also a good idea to know your test results and keep a list of the medicines you take. How can you care for yourself at home? · Use a water-based lubricant for your vagina if sex is dry or painful. Examples are Astroglide, Wet Lubricant Gel, and K-Y Jelly. · Talk with your doctor about using low-dose vaginal estrogen. It treats dryness and thinning tissue. · Do not douche. · Having sex improves blood flow to the vagina. This helps keep your tissue healthy. When should you call for help? Watch closely for changes in your health, and be sure to contact your doctor if:    · You have unexpected vaginal bleeding.     · You do not get better as expected. Where can you learn more? Go to [email or web address] [email or web address] and sign in to your [employer] account. Enter R330 in the [employer] box to learn more about \"Atrophic Vaginitis: Care Instructions. \"     If you do not have an account, please click on the \"Sign Up Now\" link. Current as of: February 19, 2019  Content Version: 12.1  © 8230-6310 Healthwise, Incorporated. Care instructions adapted under license by [facility] ([facility]). If you have questions about a medical condition or this instruction, always ask your healthcare professional. [place] [address] any warranty or liability for your use of this information.

## 2020-01-06 NOTE — PATIENT INSTRUCTIONS
closely for changes in your health, and be sure to contact your doctor if:    · You have unexpected vaginal bleeding.     · You do not get better as expected. Where can you learn more? Go to [email or web address] and sign in to your [employer] account. Enter R330 in the [employer] box to learn more about \"Atrophic Vaginitis: Care Instructions. \"     If you do not have an account, please click on the \"Sign Up Now\" link. Current as of: February 19, 2019  Content Version: 12.1  © 9636-7005 Healthwise, Incorporated. Care instructions adapted under license by [facility] ([facility]). If you have questions about a medical condition or this instruction, always ask your healthcare professional. [address] any warranty or liability for your use of this information.

## 2020-02-24 LAB
ANION GAP SERPL CALCULATED.3IONS-SCNC: 14 MMOL/L (ref 7–19)
BUN BLDV-MCNC: 13 MG/DL (ref 8–23)
CALCIUM SERPL-MCNC: 9.3 MG/DL (ref 8.8–10.2)
CHLORIDE BLD-SCNC: 101 MMOL/L (ref 98–111)
CHOLESTEROL, TOTAL: 152 MG/DL (ref 160–199)
CO2: 26 MMOL/L (ref 22–29)
CREAT SERPL-MCNC: 0.8 MG/DL (ref 0.5–0.9)
GFR NON-AFRICAN AMERICAN: >60
GLUCOSE BLD-MCNC: 126 MG/DL (ref 74–109)
HDLC SERPL-MCNC: 88 MG/DL (ref 65–121)
LDL CHOLESTEROL CALCULATED: 42 MG/DL
POTASSIUM SERPL-SCNC: 4.2 MMOL/L (ref 3.5–5)
SODIUM BLD-SCNC: 141 MMOL/L (ref 136–145)
TRIGL SERPL-MCNC: 108 MG/DL (ref 0–149)

## 2020-03-06 ENCOUNTER — TRANSCRIBE ORDERS (OUTPATIENT)
Dept: ADMINISTRATIVE | Facility: HOSPITAL | Age: 80
End: 2020-03-06

## 2020-03-06 DIAGNOSIS — Z12.31 OTHER SCREENING MAMMOGRAM: Primary | ICD-10-CM

## 2020-03-09 ENCOUNTER — OFFICE VISIT (OUTPATIENT)
Dept: OBGYN | Age: 80
End: 2020-03-09
Payer: MEDICARE

## 2020-03-09 VITALS
HEART RATE: 83 BPM | HEIGHT: 62 IN | WEIGHT: 149 LBS | BODY MASS INDEX: 27.42 KG/M2 | SYSTOLIC BLOOD PRESSURE: 145 MMHG | DIASTOLIC BLOOD PRESSURE: 75 MMHG

## 2020-03-09 PROCEDURE — G8399 PT W/DXA RESULTS DOCUMENT: HCPCS | Performed by: NURSE PRACTITIONER

## 2020-03-09 PROCEDURE — 1123F ACP DISCUSS/DSCN MKR DOCD: CPT | Performed by: NURSE PRACTITIONER

## 2020-03-09 PROCEDURE — 1036F TOBACCO NON-USER: CPT | Performed by: NURSE PRACTITIONER

## 2020-03-09 PROCEDURE — 99213 OFFICE O/P EST LOW 20 MIN: CPT | Performed by: NURSE PRACTITIONER

## 2020-03-09 PROCEDURE — G8427 DOCREV CUR MEDS BY ELIG CLIN: HCPCS | Performed by: NURSE PRACTITIONER

## 2020-03-09 PROCEDURE — 1090F PRES/ABSN URINE INCON ASSESS: CPT | Performed by: NURSE PRACTITIONER

## 2020-03-09 PROCEDURE — G8417 CALC BMI ABV UP PARAM F/U: HCPCS | Performed by: NURSE PRACTITIONER

## 2020-03-09 PROCEDURE — 0509F URINE INCON PLAN DOCD: CPT | Performed by: NURSE PRACTITIONER

## 2020-03-09 PROCEDURE — G8484 FLU IMMUNIZE NO ADMIN: HCPCS | Performed by: NURSE PRACTITIONER

## 2020-03-09 PROCEDURE — 4040F PNEUMOC VAC/ADMIN/RCVD: CPT | Performed by: NURSE PRACTITIONER

## 2020-03-09 ASSESSMENT — ENCOUNTER SYMPTOMS
CONSTIPATION: 0
DIARRHEA: 0
ALLERGIC/IMMUNOLOGIC NEGATIVE: 1
RESPIRATORY NEGATIVE: 1
EYES NEGATIVE: 1
GASTROINTESTINAL NEGATIVE: 1

## 2020-03-09 NOTE — PROGRESS NOTES
inhaler       tamoxifen (NOLVADEX) 10 MG tablet       montelukast (SINGULAIR) 10 MG tablet Take 10 mg by mouth nightly      levocetirizine (XYZAL) 5 MG tablet       metFORMIN (GLUCOPHAGE) 500 MG tablet Take 500 mg by mouth daily (with breakfast)       ONETOUCH VERIO strip        No current facility-administered medications for this visit. Allergies   Allergen Reactions    Ceftin [Cefuroxime Axetil] Hives and Nausea And Vomiting    Ciprofloxacin Hives and Nausea And Vomiting    Keftab [Cephalexin] Hives and Nausea And Vomiting    Septra [Sulfamethoxazole-Trimethoprim] Hives and Nausea And Vomiting     Vitals:    03/09/20 1003   BP: (!) 145/75   Pulse: 83     Body mass index is 27.25 kg/m². Review of Systems   Constitutional: Negative. HENT: Negative. Eyes: Negative. Respiratory: Negative. Cardiovascular: Negative. Gastrointestinal: Negative. Negative for constipation and diarrhea. Endocrine: Negative. Genitourinary: Positive for genital sores. Negative for frequency, menstrual problem and urgency. Musculoskeletal: Negative. Skin: Negative. Allergic/Immunologic: Negative. Neurological: Negative. Hematological: Negative. Psychiatric/Behavioral: Negative. All other systems reviewed and are negative. Physical Exam  Vitals signs and nursing note reviewed. Constitutional:       Appearance: She is well-developed. HENT:      Head: Normocephalic. Right Ear: External ear normal.      Left Ear: External ear normal.      Nose: Nose normal.   Neck:      Musculoskeletal: Normal range of motion. Genitourinary:         Comments: Tiny 2mm area on left labia minora, erythematic, diffuse not isolated  Small 3mm erythematic area at 7 o clock at vaginal introitus    Musculoskeletal: Normal range of motion. Skin:     General: Skin is warm and dry. Neurological:      Mental Status: She is alert and oriented to person, place, and time.    Psychiatric: Attention and Perception: Attention normal.         Mood and Affect: Mood normal.         Speech: Speech normal.         Behavior: Behavior normal.         Thought Content: Thought content normal.         Cognition and Memory: Cognition normal.         Judgment: Judgment normal.          Diagnosis Orders   1. Vulvar lesion     2. Continuous leakage of urine         MEDICATIONS:  No orders of the defined types were placed in this encounter. ORDERS:  No orders of the defined types were placed in this encounter. PLAN:  Discussed to continue Vitamin A&D ointment as protectant  Can also use zinc oxide prn  No biopsy needed    Patient Instructions   We are committed to providing you with the best care possible. In order to help us achieve these goals please remember to bring all medications, herbal products, and over the counter supplements with you to each visit. If your provider has ordered testing for you, please be sure to follow up with our office if you have not received results within 7 days after testing took place. *If you receive a survey after visiting one of our offices, please take the time to share your experience concerning your physician office visit. These surveys are confidential and no health information about you is shared. We are eager to improve for you and we are counting on your feedback to help make that happen.

## 2020-06-15 ENCOUNTER — TRANSCRIBE ORDERS (OUTPATIENT)
Dept: ADMINISTRATIVE | Facility: HOSPITAL | Age: 80
End: 2020-06-15

## 2020-06-15 DIAGNOSIS — K80.50 BILIARY PAIN: ICD-10-CM

## 2020-06-15 DIAGNOSIS — R11.0 NAUSEA: Primary | ICD-10-CM

## 2020-06-17 ENCOUNTER — HOSPITAL ENCOUNTER (OUTPATIENT)
Dept: ULTRASOUND IMAGING | Facility: HOSPITAL | Age: 80
Discharge: HOME | End: 2020-06-17
Admitting: CLINICAL NURSE SPECIALIST

## 2020-06-17 PROCEDURE — 76705 ECHO EXAM OF ABDOMEN: CPT

## 2020-06-22 ENCOUNTER — TRANSCRIBE ORDERS (OUTPATIENT)
Dept: ADMINISTRATIVE | Facility: HOSPITAL | Age: 80
End: 2020-06-22

## 2020-06-22 DIAGNOSIS — R11.0 NAUSEA: Primary | ICD-10-CM

## 2020-06-26 ENCOUNTER — HOSPITAL ENCOUNTER (OUTPATIENT)
Dept: MRI IMAGING | Facility: HOSPITAL | Age: 80
Discharge: HOME | End: 2020-06-26
Admitting: CLINICAL NURSE SPECIALIST

## 2020-06-26 DIAGNOSIS — R11.0 NAUSEA: ICD-10-CM

## 2020-06-26 PROCEDURE — 74181 MRI ABDOMEN W/O CONTRAST: CPT

## 2020-08-11 ENCOUNTER — APPOINTMENT (OUTPATIENT)
Dept: LAB | Facility: HOSPITAL | Age: 80
End: 2020-08-11

## 2020-08-21 LAB
ANION GAP SERPL CALCULATED.3IONS-SCNC: 11 MMOL/L (ref 7–19)
BASOPHILS ABSOLUTE: 0 K/UL (ref 0–0.2)
BASOPHILS RELATIVE PERCENT: 0.5 % (ref 0–1)
BUN BLDV-MCNC: 20 MG/DL (ref 8–23)
CALCIUM SERPL-MCNC: 9.7 MG/DL (ref 8.8–10.2)
CHLORIDE BLD-SCNC: 102 MMOL/L (ref 98–111)
CHOLESTEROL, TOTAL: 153 MG/DL (ref 160–199)
CO2: 28 MMOL/L (ref 22–29)
CREAT SERPL-MCNC: 0.9 MG/DL (ref 0.5–0.9)
EOSINOPHILS ABSOLUTE: 0.2 K/UL (ref 0–0.6)
EOSINOPHILS RELATIVE PERCENT: 2.2 % (ref 0–5)
GFR AFRICAN AMERICAN: >59
GFR NON-AFRICAN AMERICAN: >60
GLUCOSE BLD-MCNC: 120 MG/DL (ref 74–109)
HBA1C MFR BLD: 5.7 % (ref 4–6)
HCT VFR BLD CALC: 43 % (ref 37–47)
HDLC SERPL-MCNC: 78 MG/DL (ref 65–121)
HEMOGLOBIN: 14.9 G/DL (ref 12–16)
IMMATURE GRANULOCYTES #: 0 K/UL
LDL CHOLESTEROL CALCULATED: 56 MG/DL
LYMPHOCYTES ABSOLUTE: 1.1 K/UL (ref 1.1–4.5)
LYMPHOCYTES RELATIVE PERCENT: 14.9 % (ref 20–40)
MCH RBC QN AUTO: 33.3 PG (ref 27–31)
MCHC RBC AUTO-ENTMCNC: 34.7 G/DL (ref 33–37)
MCV RBC AUTO: 96 FL (ref 81–99)
MONOCYTES ABSOLUTE: 0.6 K/UL (ref 0–0.9)
MONOCYTES RELATIVE PERCENT: 8 % (ref 0–10)
NEUTROPHILS ABSOLUTE: 5.7 K/UL (ref 1.5–7.5)
NEUTROPHILS RELATIVE PERCENT: 73.9 % (ref 50–65)
PDW BLD-RTO: 11.9 % (ref 11.5–14.5)
PLATELET # BLD: 214 K/UL (ref 130–400)
PMV BLD AUTO: 10.4 FL (ref 9.4–12.3)
POTASSIUM SERPL-SCNC: 4.1 MMOL/L (ref 3.5–5)
RBC # BLD: 4.48 M/UL (ref 4.2–5.4)
SODIUM BLD-SCNC: 141 MMOL/L (ref 136–145)
TRIGL SERPL-MCNC: 97 MG/DL (ref 0–149)
WBC # BLD: 7.7 K/UL (ref 4.8–10.8)

## 2020-08-26 ENCOUNTER — TELEPHONE (OUTPATIENT)
Dept: ONCOLOGY | Facility: CLINIC | Age: 80
End: 2020-08-26

## 2020-08-31 ENCOUNTER — TRANSCRIBE ORDERS (OUTPATIENT)
Dept: ADMINISTRATIVE | Facility: HOSPITAL | Age: 80
End: 2020-08-31

## 2020-08-31 ENCOUNTER — HOSPITAL ENCOUNTER (OUTPATIENT)
Dept: MAMMOGRAPHY | Facility: HOSPITAL | Age: 80
Discharge: HOME | End: 2020-08-31
Admitting: SPECIALIST

## 2020-08-31 DIAGNOSIS — Z12.31 OTHER SCREENING MAMMOGRAM: ICD-10-CM

## 2020-08-31 DIAGNOSIS — N63.0 LUMP OF BREAST: Primary | ICD-10-CM

## 2020-08-31 DIAGNOSIS — R92.8 ABNORMAL MAMMOGRAM: ICD-10-CM

## 2020-08-31 PROCEDURE — 77067 SCR MAMMO BI INCL CAD: CPT

## 2020-08-31 PROCEDURE — 77063 BREAST TOMOSYNTHESIS BI: CPT

## 2020-09-01 ENCOUNTER — HOSPITAL ENCOUNTER (OUTPATIENT)
Dept: ULTRASOUND IMAGING | Facility: HOSPITAL | Age: 80
End: 2020-09-01

## 2020-09-02 ENCOUNTER — HOSPITAL ENCOUNTER (OUTPATIENT)
Dept: ULTRASOUND IMAGING | Facility: HOSPITAL | Age: 80
Discharge: HOME | End: 2020-09-02
Admitting: SPECIALIST

## 2020-09-02 ENCOUNTER — APPOINTMENT (OUTPATIENT)
Dept: LAB | Facility: HOSPITAL | Age: 80
End: 2020-09-02

## 2020-09-02 DIAGNOSIS — R92.8 ABNORMAL MAMMOGRAM: ICD-10-CM

## 2020-09-02 PROCEDURE — 76642 ULTRASOUND BREAST LIMITED: CPT

## 2020-09-10 ENCOUNTER — TRANSCRIBE ORDERS (OUTPATIENT)
Dept: ADMINISTRATIVE | Facility: HOSPITAL | Age: 80
End: 2020-09-10

## 2020-09-10 DIAGNOSIS — Z01.818 PRE-OP TESTING: Primary | ICD-10-CM

## 2020-09-11 ENCOUNTER — APPOINTMENT (OUTPATIENT)
Dept: PREADMISSION TESTING | Facility: HOSPITAL | Age: 80
End: 2020-09-11

## 2020-09-11 ENCOUNTER — LAB (OUTPATIENT)
Dept: LAB | Facility: HOSPITAL | Age: 80
End: 2020-09-11

## 2020-09-11 VITALS
SYSTOLIC BLOOD PRESSURE: 145 MMHG | OXYGEN SATURATION: 98 % | DIASTOLIC BLOOD PRESSURE: 59 MMHG | HEIGHT: 62 IN | RESPIRATION RATE: 16 BRPM | HEART RATE: 92 BPM | BODY MASS INDEX: 28.32 KG/M2 | WEIGHT: 153.88 LBS

## 2020-09-11 LAB
ALBUMIN SERPL-MCNC: 4.4 G/DL (ref 3.5–5.2)
ALBUMIN/GLOB SERPL: 1.8 G/DL
ALP SERPL-CCNC: 61 U/L (ref 39–117)
ALT SERPL W P-5'-P-CCNC: 14 U/L (ref 1–33)
ANION GAP SERPL CALCULATED.3IONS-SCNC: 10 MMOL/L (ref 5–15)
AST SERPL-CCNC: 21 U/L (ref 1–32)
BASOPHILS # BLD AUTO: 0.05 10*3/MM3 (ref 0–0.2)
BASOPHILS NFR BLD AUTO: 0.6 % (ref 0–1.5)
BILIRUB SERPL-MCNC: 0.3 MG/DL (ref 0–1.2)
BUN SERPL-MCNC: 16 MG/DL (ref 8–23)
BUN/CREAT SERPL: 20.8 (ref 7–25)
CALCIUM SPEC-SCNC: 9.1 MG/DL (ref 8.6–10.5)
CHLORIDE SERPL-SCNC: 105 MMOL/L (ref 98–107)
CO2 SERPL-SCNC: 25 MMOL/L (ref 22–29)
CREAT SERPL-MCNC: 0.77 MG/DL (ref 0.57–1)
DEPRECATED RDW RBC AUTO: 41.4 FL (ref 37–54)
EOSINOPHIL # BLD AUTO: 0.13 10*3/MM3 (ref 0–0.4)
EOSINOPHIL NFR BLD AUTO: 1.6 % (ref 0.3–6.2)
ERYTHROCYTE [DISTWIDTH] IN BLOOD BY AUTOMATED COUNT: 11.9 % (ref 12.3–15.4)
GFR SERPL CREATININE-BSD FRML MDRD: 72 ML/MIN/1.73
GLOBULIN UR ELPH-MCNC: 2.5 GM/DL
GLUCOSE SERPL-MCNC: 140 MG/DL (ref 65–99)
HCT VFR BLD AUTO: 39.4 % (ref 34–46.6)
HGB BLD-MCNC: 14 G/DL (ref 12–15.9)
IMM GRANULOCYTES # BLD AUTO: 0.03 10*3/MM3 (ref 0–0.05)
IMM GRANULOCYTES NFR BLD AUTO: 0.4 % (ref 0–0.5)
LYMPHOCYTES # BLD AUTO: 0.93 10*3/MM3 (ref 0.7–3.1)
LYMPHOCYTES NFR BLD AUTO: 11.3 % (ref 19.6–45.3)
MCH RBC QN AUTO: 33.3 PG (ref 26.6–33)
MCHC RBC AUTO-ENTMCNC: 35.5 G/DL (ref 31.5–35.7)
MCV RBC AUTO: 93.6 FL (ref 79–97)
MONOCYTES # BLD AUTO: 0.49 10*3/MM3 (ref 0.1–0.9)
MONOCYTES NFR BLD AUTO: 5.9 % (ref 5–12)
NEUTROPHILS NFR BLD AUTO: 6.61 10*3/MM3 (ref 1.7–7)
NEUTROPHILS NFR BLD AUTO: 80.2 % (ref 42.7–76)
NRBC BLD AUTO-RTO: 0 /100 WBC (ref 0–0.2)
PLATELET # BLD AUTO: 212 10*3/MM3 (ref 140–450)
PMV BLD AUTO: 10.2 FL (ref 6–12)
POTASSIUM SERPL-SCNC: 4 MMOL/L (ref 3.5–5.2)
PROT SERPL-MCNC: 6.9 G/DL (ref 6–8.5)
RBC # BLD AUTO: 4.21 10*6/MM3 (ref 3.77–5.28)
SODIUM SERPL-SCNC: 140 MMOL/L (ref 136–145)
WBC # BLD AUTO: 8.24 10*3/MM3 (ref 3.4–10.8)

## 2020-09-11 PROCEDURE — C9803 HOPD COVID-19 SPEC COLLECT: HCPCS | Performed by: SPECIALIST

## 2020-09-11 PROCEDURE — 93010 ELECTROCARDIOGRAM REPORT: CPT | Performed by: INTERNAL MEDICINE

## 2020-09-11 PROCEDURE — U0003 INFECTIOUS AGENT DETECTION BY NUCLEIC ACID (DNA OR RNA); SEVERE ACUTE RESPIRATORY SYNDROME CORONAVIRUS 2 (SARS-COV-2) (CORONAVIRUS DISEASE [COVID-19]), AMPLIFIED PROBE TECHNIQUE, MAKING USE OF HIGH THROUGHPUT TECHNOLOGIES AS DESCRIBED BY CMS-2020-01-R: HCPCS | Performed by: SPECIALIST

## 2020-09-11 PROCEDURE — 80053 COMPREHEN METABOLIC PANEL: CPT | Performed by: SPECIALIST

## 2020-09-11 PROCEDURE — 93005 ELECTROCARDIOGRAM TRACING: CPT

## 2020-09-11 PROCEDURE — 85025 COMPLETE CBC W/AUTO DIFF WBC: CPT | Performed by: SPECIALIST

## 2020-09-11 PROCEDURE — 36415 COLL VENOUS BLD VENIPUNCTURE: CPT

## 2020-09-11 NOTE — DISCHARGE INSTRUCTIONS
DAY OF SURGERY INSTRUCTIONS        YOUR SURGEON: dr fontana    PROCEDURE: ***wide excision right breast nodule     DATE OF SURGERY: ***9/14/2020    ARRIVAL TIME: AS DIRECTED BY OFFICE  No   YOU MAY TAKE THE FOLLOWING MEDICATION(S) THE MORNING OF SURGERY WITH A SIP OF WATER: ***none      ALL OTHER HOME MEDICATION CHECK WITH YOUR PHYSICIAN                            MANAGING PAIN AFTER SURGERY    We know you are probably wondering what your pain will be like after surgery.  Following surgery it is unrealistic to expect you will not have pain.   Pain is how our bodies let us know that something is wrong or cautions us to be careful.  That said, our goal is to make your pain tolerable.    Methods we may use to treat your pain include (oral or IV medications, PCAs, epidurals, nerve blocks, etc.)   While some procedures require IV pain medications for a short time after surgery, transitioning to pain medications by mouth allows for better management of pain.   Your nurse will encourage you to take oral pain medications whenever possible.  IV medications work almost immediately, but only last a short while.  Taking medications by mouth allows for a more constant level of medication in your blood stream for a longer period of time.      Once your pain is out of control it is harder to get back under control.  It is important you are aware when your next dose of pain medication is due.  If you are admitted, your nurse may write the time of your next dose on the white board in your room to help you remember.      We are interested in your pain and encourage you to inform us about aggravating factors during your visit.   Many times a simple repositioning every few hours can make a big difference.    If your physician says it is okay, do not let your pain prevent you from getting out of bed. Be sure to call your nurse for assistance prior to getting up so you do not fall.      Before surgery, please decide your tolerable pain  goal.  These faces help describe the pain ratings we use on a 0-10 scale.   Be prepared to tell us your goal and whether or not you take pain or anxiety medications at home.          BEFORE YOU COME TO THE HOSPITAL  (Pre-op instructions)  • Do not eat, drink, smoke or chew gum after midnight the night before surgery.  This also includes no mints.  • Morning of surgery take only the medicines you have been instructed with a sip of water unless otherwise instructed  by your physician.  • Do not shave, wear makeup or dark nail polish.  • Remove all jewelry including rings.  • Leave anything you consider valuable at home.  • Leave your suitcase in the car until after your surgery.  • Bring the following with you if applicable:  o Picture ID and insurance, Medicare or Medicaid cards  o Co-pay/deductible required by insurance (cash, check, credit card)  o Copy of advance directive, living will or power-of- documents if not brought to PAT  o CPAP or BIPAP mask and tubing  o Relaxation aids ( book, magazine), etc.  o Hearing aids                        ON THE DAY OF SURGERY  · On the day of surgery check in at registration located at the main entrance of the hospital.   ? You will be registered and given a beeper with instructions where to wait in the main lobby.  ? When your beeper lights up and vibrates a member of the Outpatient Surgery staff will meet you at the double doors under the stair steps and escort you to your preoperative room.   · You may have cloth compression devices placed on your legs. These help to prevent blood clots and reduce swelling in your legs.  · An IV may be inserted into one of your veins.  · In the operating room, you may be given one or more of the following:  ? A medicine to help you relax (sedative).  ? A medicine to numb the area (local anesthetic).  ? A medicine to make you fall asleep (general anesthetic).  ? A medicine that is injected into an area of your body to numb  "everything below the injection site (regional anesthetic).  · Your surgical site will be marked or identified.  · You may be given an antibiotic through your IV to help prevent infection.  Contact a health care provider if you:  · Develop a fever of more than 100.4°F (38°C) or other feelings of illness during the 48 hours before your surgery.  · Have symptoms that get worse.  Have questions or concerns about your surgery    General Anesthesia/Surgery, Adult  General anesthesia is the use of medicines to make a person \"go to sleep\" (unconscious) for a medical procedure. General anesthesia must be used for certain procedures, and is often recommended for procedures that:  · Last a long time.  · Require you to be still or in an unusual position.  · Are major and can cause blood loss.  The medicines used for general anesthesia are called general anesthetics. As well as making you unconscious for a certain amount of time, these medicines:  · Prevent pain.  · Control your blood pressure.  · Relax your muscles.  Tell a health care provider about:  · Any allergies you have.  · All medicines you are taking, including vitamins, herbs, eye drops, creams, and over-the-counter medicines.  · Any problems you or family members have had with anesthetic medicines.  · Types of anesthetics you have had in the past.  · Any blood disorders you have.  · Any surgeries you have had.  · Any medical conditions you have.  · Any recent upper respiratory, chest, or ear infections.  · Any history of:  ? Heart or lung conditions, such as heart failure, sleep apnea, asthma, or chronic obstructive pulmonary disease (COPD).  ?  service.  ? Depression or anxiety.  · Any tobacco or drug use, including marijuana or alcohol use.  · Whether you are pregnant or may be pregnant.  What are the risks?  Generally, this is a safe procedure. However, problems may occur, including:  · Allergic reaction.  · Lung and heart problems.  · Inhaling food or " liquid from the stomach into the lungs (aspiration).  · Nerve injury.  · Air in the bloodstream, which can lead to stroke.  · Extreme agitation or confusion (delirium) when you wake up from the anesthetic.  · Waking up during your procedure and being unable to move. This is rare.  These problems are more likely to develop if you are having a major surgery or if you have an advanced or serious medical condition. You can prevent some of these complications by answering all of your health care provider's questions thoroughly and by following all instructions before your procedure.  General anesthesia can cause side effects, including:  · Nausea or vomiting.  · A sore throat from the breathing tube.  · Hoarseness.  · Wheezing or coughing.  · Shaking chills.  · Tiredness.  · Body aches.  · Anxiety.  · Sleepiness or drowsiness.  · Confusion or agitation.  RISKS AND COMPLICATIONS OF SURGERY  Your health care provider will discuss possible risks and complications with you before surgery. Common risks and complications include:    · Problems due to the use of anesthetics.  · Blood loss and replacement (does not apply to minor surgical procedures).  · Temporary increase in pain due to surgery.  · Uncorrected pain or problems that the surgery was meant to correct.  · Infection.  · New damage.    What happens before the procedure?    Medicines  Ask your health care provider about:  · Changing or stopping your regular medicines. This is especially important if you are taking diabetes medicines or blood thinners.  · Taking medicines such as aspirin and ibuprofen. These medicines can thin your blood. Do not take these medicines unless your health care provider tells you to take them.  · Taking over-the-counter medicines, vitamins, herbs, and supplements. Do not take these during the week before your procedure unless your health care provider approves them.  General instructions  · Starting 3-6 weeks before the procedure, do not  use any products that contain nicotine or tobacco, such as cigarettes and e-cigarettes. If you need help quitting, ask your health care provider.  · If you brush your teeth on the morning of the procedure, make sure to spit out all of the toothpaste.  · Tell your health care provider if you become ill or develop a cold, cough, or fever.  · If instructed by your health care provider, bring your sleep apnea device with you on the day of your surgery (if applicable).  · Ask your health care provider if you will be going home the same day, the following day, or after a longer hospital stay.  ? Plan to have someone take you home from the hospital or clinic.  ? Plan to have a responsible adult care for you for at least 24 hours after you leave the hospital or clinic. This is important.  What happens during the procedure?  · You will be given anesthetics through both of the following:  ? A mask placed over your nose and mouth.  ? An IV in one of your veins.  · You may receive a medicine to help you relax (sedative).  · After you are unconscious, a breathing tube may be inserted down your throat to help you breathe. This will be removed before you wake up.  · An anesthesia specialist will stay with you throughout your procedure. He or she will:  ? Keep you comfortable and safe by continuing to give you medicines and adjusting the amount of medicine that you get.  ? Monitor your blood pressure, pulse, and oxygen levels to make sure that the anesthetics do not cause any problems.  The procedure may vary among health care providers and hospitals.  What happens after the procedure?  · Your blood pressure, temperature, heart rate, breathing rate, and blood oxygen level will be monitored until the medicines you were given have worn off.  · You will wake up in a recovery area. You may wake up slowly.  · If you feel anxious or agitated, you may be given medicine to help you calm down.  · If you will be going home the same day, your  health care provider may check to make sure you can walk, drink, and urinate.  · Your health care provider will treat any pain or side effects you have before you go home.  · Do not drive for 24 hours if you were given a sedative.  Summary  · General anesthesia is used to keep you still and prevent pain during a procedure.  · It is important to tell your healthcare provider about your medical history and any surgeries you have had, and previous experience with anesthesia.  · Follow your healthcare provider’s instructions about when to stop eating, drinking, or taking certain medicines before your procedure.  · Plan to have someone take you home from the hospital or clinic.  This information is not intended to replace advice given to you by your health care provider. Make sure you discuss any questions you have with your health care provider.  Document Released: 03/26/2009 Document Revised: 08/03/2018 Document Reviewed: 08/03/2018  [employer] Interactive Patient Education © 2019 [employer]       Fall Prevention in Hospitals, Adult  As a hospital patient, your condition and the treatments you receive can increase your risk for falls. Some additional risk factors for falls in a hospital include:  · Being in an unfamiliar environment.  · Being on bed rest.  · Your surgery.  · Taking certain medicines.  · Your tubing requirements, such as intravenous (IV) therapy or catheters.  It is important that you learn how to decrease fall risks while at the hospital. Below are important tips that can help prevent falls.  SAFETY TIPS FOR PREVENTING FALLS  Talk about your risk of falling.  · Ask your health care provider why you are at risk for falling. Is it your medicine, illness, tubing placement, or something else?  · Make a plan with your health care provider to keep you safe from falls.  · Ask your health care provider or pharmacist about side effects of your medicines. Some medicines can make you dizzy or affect your  coordination.  Ask for help.  · Ask for help before getting out of bed. You may need to press your call button.  · Ask for assistance in getting safely to the toilet.  · Ask for a walker or cane to be put at your bedside. Ask that most of the side rails on your bed be placed up before your health care provider leaves the room.  · Ask family or friends to sit with you.  · Ask for things that are out of your reach, such as your glasses, hearing aids, telephone, bedside table, or call button.  Follow these tips to avoid falling:  · Stay lying or seated, rather than standing, while waiting for help.  · Wear rubber-soled slippers or shoes whenever you walk in the hospital.  · Avoid quick, sudden movements.  ¨ Change positions slowly.  ¨ Sit on the side of your bed before standing.  ¨ Stand up slowly and wait before you start to walk.  · Let your health care provider know if there is a spill on the floor.  · Pay careful attention to the medical equipment, electrical cords, and tubes around you.  · When you need help, use your call button by your bed or in the bathroom. Wait for one of your health care providers to help you.  · If you feel dizzy or unsure of your footing, return to bed and wait for assistance.  · Avoid being distracted by the TV, telephone, or another person in your room.  · Do not lean or support yourself on rolling objects, such as IV poles or bedside tables.     This information is not intended to replace advice given to you by your health care provider. Make sure you discuss any questions you have with your health care provider.     Document Released: 12/15/2001 Document Revised: 01/08/2016 Document Reviewed: 08/25/2013  [employer] Interactive Patient Education ©2016 [employer]       [facility]  CHG 4% Patient Instruction Sheet    Chlorhexidine Before Surgery  Chlorhexidine gluconate (CHG) is a germ-killing (antiseptic) solution that is used to clean the skin. It gets rid of the bacteria  that normally live on the skin. Cleaning your skin with CHG before surgery helps lower the risk for infection after surgery.    How to use CHG solution  · You will take 2 showers, one shower the night before surgery, the second shower the morning of surgery before coming to the hospital.  · Use CHG only as told by your health care provider, and follow the instructions on the label.  · Use CHG solution while taking a shower. Follow these steps when using CHG solution (unless your health care provider gives you different instructions):  1. Start the shower.  2. Use your normal soap and shampoo to wash your face and hair.  3. Turn off the shower or move out of the shower stream.  4. Pour the CHG onto a clean washcloth. Do not use any type of brush or rough-edged sponge.  5. Starting at your neck, lather your body down to your toes. Make sure you:  6. Pay special attention to the part of your body where you will be having surgery. Scrub this area for at least 1 minute.  7. Use the full amount of CHG as directed. Usually, this is one half bottle for each shower.  8. Do not use CHG on your head or face. If the solution gets into your ears or eyes, rinse them well with water.  9. Avoid your genital area.  10. Avoid any areas of skin that have broken skin, cuts, or scrapes.  11. Scrub your back and under your arms. Make sure to wash skin folds.  12. Let the lather sit on your skin for 1-2 minutes or as long as told by your health care  provider.  13. Thoroughly rinse your entire body in the shower. Make sure that all body creases and crevices are rinsed well.  14. Dry off with a clean towel. Do not put any substances on your body afterward, such as powder, lotion, or perfume.  15. Put on clean clothes or pajamas.  16. If it is the night before your surgery, sleep in clean sheets.    What are the risks?  Risks of using CHG include:  · A skin reaction.  · Hearing loss, if CHG gets in your ears.  · Eye injury, if CHG gets in  your eyes and is not rinsed out.  · The CHG product catching fire.  Make sure that you avoid smoking and flames after applying CHG to your skin.  Do not use CHG:  · If you have a chlorhexidine allergy or have previously reacted to chlorhexidine.  · On babies younger than 2 months of age.      On the day of surgery, when you are taken to your room in Outpatient Surgery you will be given a CHG prepackaged cloth to wipe the site for your surgery.  How to use CHG prepackaged cloths  · Follow the instructions on the label.  · Use the CHG cloth on clean, dry skin. Follow these steps when using a CHG cloth (unless your health care provider gives you different instructions):  1. Using the CHG cloth, vigorously scrub the part of your body where you will be having surgery. Scrub using a back-and-forth motion for 3 minutes. The area on your body should be completely wet with CHG when you are finished scrubbing.  2. Do not rinse. Discard the cloth and let the area air-dry for 1 minute. Do not put any substances on your body afterward, such as powder, lotion, or perfume.  Contact a health care provider if:  · Your skin gets irritated after scrubbing.  · You have questions about using your solution or cloth.  Get help right away if:  · Your eyes become very red or swollen.  · Your eyes itch badly.  · Your skin itches badly and is red or swollen.  · Your hearing changes.  · You have trouble seeing.  · You have swelling or tingling in your mouth or throat.  · You have trouble breathing.  · You swallow any chlorhexidine.  Summary  · Chlorhexidine gluconate (CHG) is a germ-killing (antiseptic) solution that is used to clean the skin. Cleaning your skin with CHG before surgery helps lower the risk for infection after surgery.  · You may be given CHG to use at home. It may be in a bottle or in a prepackaged cloth to use on your skin. Carefully follow your health care provider's instructions and the instructions on the product  label.  · Do not use CHG if you have a chlorhexidine allergy.  · Contact your health care provider if your skin gets irritated after scrubbing.  This information is not intended to replace advice given to you by your health care provider. Make sure you discuss any questions you have with your health care provider.  Document Released: 09/11/2013 Document Revised: 11/15/2018 Document Reviewed: 11/15/2018  [employer] Interactive Patient Education © 2019 [employer]          PATIENT/FAMILY/RESPONSIBLE PARTY VERBALIZES UNDERSTANDING OF ABOVE EDUCATION.  COPY OF PAIN SCALE GIVEN AND REVIEWED WITH VERBALIZED UNDERSTANDING.

## 2020-09-12 LAB
COVID LABCORP PRIORITY: NORMAL
SARS-COV-2 RNA RESP QL NAA+PROBE: NOT DETECTED

## 2020-09-14 ENCOUNTER — ANESTHESIA EVENT (OUTPATIENT)
Dept: PERIOP | Facility: HOSPITAL | Age: 80
End: 2020-09-14

## 2020-09-14 ENCOUNTER — HOSPITAL ENCOUNTER (OUTPATIENT)
Facility: HOSPITAL | Age: 80
Setting detail: HOSPITAL OUTPATIENT SURGERY
Discharge: HOME | End: 2020-09-14
Attending: SPECIALIST | Admitting: SPECIALIST

## 2020-09-14 ENCOUNTER — ANESTHESIA (OUTPATIENT)
Dept: PERIOP | Facility: HOSPITAL | Age: 80
End: 2020-09-14

## 2020-09-14 VITALS
RESPIRATION RATE: 14 BRPM | TEMPERATURE: 97.7 F | DIASTOLIC BLOOD PRESSURE: 75 MMHG | OXYGEN SATURATION: 97 % | SYSTOLIC BLOOD PRESSURE: 150 MMHG | HEART RATE: 79 BPM

## 2020-09-14 DIAGNOSIS — N63.0 BREAST MASS: ICD-10-CM

## 2020-09-14 LAB
GLUCOSE BLDC GLUCOMTR-MCNC: 133 MG/DL (ref 70–130)
GLUCOSE BLDC GLUCOMTR-MCNC: 143 MG/DL (ref 70–130)

## 2020-09-14 PROCEDURE — 25010000002 PROPOFOL 10 MG/ML EMULSION: Performed by: NURSE ANESTHETIST, CERTIFIED REGISTERED

## 2020-09-14 PROCEDURE — 25010000002 FENTANYL CITRATE (PF) 100 MCG/2ML SOLUTION: Performed by: NURSE ANESTHETIST, CERTIFIED REGISTERED

## 2020-09-14 PROCEDURE — 25010000002 DEXAMETHASONE PER 1 MG: Performed by: NURSE ANESTHETIST, CERTIFIED REGISTERED

## 2020-09-14 PROCEDURE — 82962 GLUCOSE BLOOD TEST: CPT

## 2020-09-14 PROCEDURE — 25010000002 VANCOMYCIN 1 G RECONSTITUTED SOLUTION: Performed by: NURSE ANESTHETIST, CERTIFIED REGISTERED

## 2020-09-14 PROCEDURE — 25010000002 METOCLOPRAMIDE PER 10 MG: Performed by: ANESTHESIOLOGY

## 2020-09-14 PROCEDURE — 25010000002 DEXAMETHASONE PER 1 MG: Performed by: ANESTHESIOLOGY

## 2020-09-14 PROCEDURE — 25010000002 SUCCINYLCHOLINE PER 20 MG: Performed by: NURSE ANESTHETIST, CERTIFIED REGISTERED

## 2020-09-14 PROCEDURE — 88305 TISSUE EXAM BY PATHOLOGIST: CPT | Performed by: SPECIALIST

## 2020-09-14 PROCEDURE — 25010000002 ONDANSETRON PER 1 MG: Performed by: NURSE ANESTHETIST, CERTIFIED REGISTERED

## 2020-09-14 PROCEDURE — 25010000002 ONDANSETRON PER 1 MG: Performed by: ANESTHESIOLOGY

## 2020-09-14 RX ORDER — EPHEDRINE SULFATE 50 MG/ML
INJECTION, SOLUTION INTRAVENOUS AS NEEDED
Status: DISCONTINUED | OUTPATIENT
Start: 2020-09-14 | End: 2020-09-14 | Stop reason: SURG

## 2020-09-14 RX ORDER — ONDANSETRON 2 MG/ML
4 INJECTION INTRAMUSCULAR; INTRAVENOUS ONCE AS NEEDED
Status: COMPLETED | OUTPATIENT
Start: 2020-09-14 | End: 2020-09-14

## 2020-09-14 RX ORDER — NALOXONE HCL 0.4 MG/ML
0.4 VIAL (ML) INJECTION AS NEEDED
Status: DISCONTINUED | OUTPATIENT
Start: 2020-09-14 | End: 2020-09-14 | Stop reason: HOSPADM

## 2020-09-14 RX ORDER — OXYCODONE HYDROCHLORIDE AND ACETAMINOPHEN 5; 325 MG/1; MG/1
1 TABLET ORAL ONCE AS NEEDED
Status: COMPLETED | OUTPATIENT
Start: 2020-09-14 | End: 2020-09-14

## 2020-09-14 RX ORDER — FAMOTIDINE 10 MG/ML
20 INJECTION, SOLUTION INTRAVENOUS
Status: COMPLETED | OUTPATIENT
Start: 2020-09-14 | End: 2020-09-14

## 2020-09-14 RX ORDER — ONDANSETRON 2 MG/ML
INJECTION INTRAMUSCULAR; INTRAVENOUS AS NEEDED
Status: DISCONTINUED | OUTPATIENT
Start: 2020-09-14 | End: 2020-09-14 | Stop reason: SURG

## 2020-09-14 RX ORDER — SODIUM CHLORIDE 0.9 % (FLUSH) 0.9 %
3-10 SYRINGE (ML) INJECTION AS NEEDED
Status: DISCONTINUED | OUTPATIENT
Start: 2020-09-14 | End: 2020-09-14 | Stop reason: HOSPADM

## 2020-09-14 RX ORDER — HYDROCODONE BITARTRATE AND ACETAMINOPHEN 7.5; 325 MG/1; MG/1
1 TABLET ORAL EVERY 4 HOURS PRN
Qty: 15 TABLET | Refills: 0 | Status: SHIPPED | OUTPATIENT
Start: 2020-09-14 | End: 2020-10-14

## 2020-09-14 RX ORDER — MAGNESIUM HYDROXIDE 1200 MG/15ML
LIQUID ORAL AS NEEDED
Status: DISCONTINUED | OUTPATIENT
Start: 2020-09-14 | End: 2020-09-14 | Stop reason: HOSPADM

## 2020-09-14 RX ORDER — LABETALOL HYDROCHLORIDE 5 MG/ML
5 INJECTION, SOLUTION INTRAVENOUS
Status: DISCONTINUED | OUTPATIENT
Start: 2020-09-14 | End: 2020-09-14 | Stop reason: HOSPADM

## 2020-09-14 RX ORDER — ACETAMINOPHEN 500 MG
1000 TABLET ORAL ONCE
Status: COMPLETED | OUTPATIENT
Start: 2020-09-14 | End: 2020-09-14

## 2020-09-14 RX ORDER — LIDOCAINE HYDROCHLORIDE 10 MG/ML
0.5 INJECTION, SOLUTION EPIDURAL; INFILTRATION; INTRACAUDAL; PERINEURAL ONCE AS NEEDED
Status: DISCONTINUED | OUTPATIENT
Start: 2020-09-14 | End: 2020-09-14 | Stop reason: HOSPADM

## 2020-09-14 RX ORDER — DEXAMETHASONE SODIUM PHOSPHATE 4 MG/ML
4 INJECTION, SOLUTION INTRA-ARTICULAR; INTRALESIONAL; INTRAMUSCULAR; INTRAVENOUS; SOFT TISSUE ONCE AS NEEDED
Status: COMPLETED | OUTPATIENT
Start: 2020-09-14 | End: 2020-09-14

## 2020-09-14 RX ORDER — DEXAMETHASONE SODIUM PHOSPHATE 4 MG/ML
INJECTION, SOLUTION INTRA-ARTICULAR; INTRALESIONAL; INTRAMUSCULAR; INTRAVENOUS; SOFT TISSUE AS NEEDED
Status: DISCONTINUED | OUTPATIENT
Start: 2020-09-14 | End: 2020-09-14 | Stop reason: SURG

## 2020-09-14 RX ORDER — SODIUM CHLORIDE, SODIUM LACTATE, POTASSIUM CHLORIDE, CALCIUM CHLORIDE 600; 310; 30; 20 MG/100ML; MG/100ML; MG/100ML; MG/100ML
100 INJECTION, SOLUTION INTRAVENOUS CONTINUOUS
Status: DISCONTINUED | OUTPATIENT
Start: 2020-09-14 | End: 2020-09-14 | Stop reason: HOSPADM

## 2020-09-14 RX ORDER — LIDOCAINE HYDROCHLORIDE 40 MG/ML
SOLUTION TOPICAL AS NEEDED
Status: DISCONTINUED | OUTPATIENT
Start: 2020-09-14 | End: 2020-09-14 | Stop reason: SURG

## 2020-09-14 RX ORDER — SODIUM CHLORIDE 0.9 % (FLUSH) 0.9 %
3 SYRINGE (ML) INJECTION EVERY 12 HOURS SCHEDULED
Status: DISCONTINUED | OUTPATIENT
Start: 2020-09-14 | End: 2020-09-14 | Stop reason: HOSPADM

## 2020-09-14 RX ORDER — PROPOFOL 10 MG/ML
VIAL (ML) INTRAVENOUS AS NEEDED
Status: DISCONTINUED | OUTPATIENT
Start: 2020-09-14 | End: 2020-09-14 | Stop reason: SURG

## 2020-09-14 RX ORDER — VANCOMYCIN HYDROCHLORIDE 1 G/20ML
INJECTION, POWDER, LYOPHILIZED, FOR SOLUTION INTRAVENOUS AS NEEDED
Status: DISCONTINUED | OUTPATIENT
Start: 2020-09-14 | End: 2020-09-14 | Stop reason: SURG

## 2020-09-14 RX ORDER — FLUMAZENIL 0.1 MG/ML
0.2 INJECTION INTRAVENOUS AS NEEDED
Status: DISCONTINUED | OUTPATIENT
Start: 2020-09-14 | End: 2020-09-14 | Stop reason: HOSPADM

## 2020-09-14 RX ORDER — ROCURONIUM BROMIDE 10 MG/ML
INJECTION, SOLUTION INTRAVENOUS AS NEEDED
Status: DISCONTINUED | OUTPATIENT
Start: 2020-09-14 | End: 2020-09-14 | Stop reason: SURG

## 2020-09-14 RX ORDER — FENTANYL CITRATE 50 UG/ML
INJECTION, SOLUTION INTRAMUSCULAR; INTRAVENOUS AS NEEDED
Status: DISCONTINUED | OUTPATIENT
Start: 2020-09-14 | End: 2020-09-14 | Stop reason: SURG

## 2020-09-14 RX ORDER — FENTANYL CITRATE 50 UG/ML
25 INJECTION, SOLUTION INTRAMUSCULAR; INTRAVENOUS
Status: DISCONTINUED | OUTPATIENT
Start: 2020-09-14 | End: 2020-09-14 | Stop reason: HOSPADM

## 2020-09-14 RX ORDER — OXYCODONE AND ACETAMINOPHEN 7.5; 325 MG/1; MG/1
1 TABLET ORAL EVERY 4 HOURS PRN
Status: CANCELLED | OUTPATIENT
Start: 2020-09-14 | End: 2020-09-24

## 2020-09-14 RX ORDER — SODIUM CHLORIDE, SODIUM LACTATE, POTASSIUM CHLORIDE, CALCIUM CHLORIDE 600; 310; 30; 20 MG/100ML; MG/100ML; MG/100ML; MG/100ML
1000 INJECTION, SOLUTION INTRAVENOUS CONTINUOUS
Status: DISCONTINUED | OUTPATIENT
Start: 2020-09-14 | End: 2020-09-14 | Stop reason: HOSPADM

## 2020-09-14 RX ORDER — SODIUM CHLORIDE 0.9 % (FLUSH) 0.9 %
3 SYRINGE (ML) INJECTION AS NEEDED
Status: DISCONTINUED | OUTPATIENT
Start: 2020-09-14 | End: 2020-09-14 | Stop reason: HOSPADM

## 2020-09-14 RX ORDER — SUCCINYLCHOLINE CHLORIDE 20 MG/ML
INJECTION INTRAMUSCULAR; INTRAVENOUS AS NEEDED
Status: DISCONTINUED | OUTPATIENT
Start: 2020-09-14 | End: 2020-09-14 | Stop reason: SURG

## 2020-09-14 RX ORDER — METOCLOPRAMIDE HYDROCHLORIDE 5 MG/ML
10 INJECTION INTRAMUSCULAR; INTRAVENOUS ONCE AS NEEDED
Status: COMPLETED | OUTPATIENT
Start: 2020-09-14 | End: 2020-09-14

## 2020-09-14 RX ADMIN — OXYCODONE HYDROCHLORIDE AND ACETAMINOPHEN 1 TABLET: 5; 325 TABLET ORAL at 10:14

## 2020-09-14 RX ADMIN — SODIUM CHLORIDE, POTASSIUM CHLORIDE, SODIUM LACTATE AND CALCIUM CHLORIDE 1000 ML: 600; 310; 30; 20 INJECTION, SOLUTION INTRAVENOUS at 06:59

## 2020-09-14 RX ADMIN — ACETAMINOPHEN 1000 MG: 500 TABLET, FILM COATED ORAL at 07:45

## 2020-09-14 RX ADMIN — PROPOFOL 150 MG: 10 INJECTION, EMULSION INTRAVENOUS at 08:18

## 2020-09-14 RX ADMIN — LIDOCAINE HYDROCHLORIDE 1 EACH: 40 SOLUTION TOPICAL at 08:18

## 2020-09-14 RX ADMIN — LIDOCAINE HYDROCHLORIDE 40 MG: 20 INJECTION, SOLUTION INTRAVENOUS at 08:18

## 2020-09-14 RX ADMIN — ONDANSETRON HYDROCHLORIDE 4 MG: 2 SOLUTION INTRAMUSCULAR; INTRAVENOUS at 08:48

## 2020-09-14 RX ADMIN — SUCCINYLCHOLINE CHLORIDE 100 MG: 20 INJECTION, SOLUTION INTRAMUSCULAR; INTRAVENOUS at 08:18

## 2020-09-14 RX ADMIN — ONDANSETRON HYDROCHLORIDE 4 MG: 2 SOLUTION INTRAMUSCULAR; INTRAVENOUS at 10:16

## 2020-09-14 RX ADMIN — EPHEDRINE SULFATE 10 MG: 50 INJECTION INTRAVENOUS at 08:27

## 2020-09-14 RX ADMIN — DEXAMETHASONE SODIUM PHOSPHATE 4 MG: 4 INJECTION, SOLUTION INTRAMUSCULAR; INTRAVENOUS at 08:28

## 2020-09-14 RX ADMIN — FENTANYL CITRATE 50 MCG: 50 INJECTION, SOLUTION INTRAMUSCULAR; INTRAVENOUS at 08:18

## 2020-09-14 RX ADMIN — METOCLOPRAMIDE 10 MG: 5 INJECTION, SOLUTION INTRAMUSCULAR; INTRAVENOUS at 07:46

## 2020-09-14 RX ADMIN — ROCURONIUM BROMIDE 5 MG: 10 INJECTION INTRAVENOUS at 08:18

## 2020-09-14 RX ADMIN — DEXAMETHASONE SODIUM PHOSPHATE 4 MG: 4 INJECTION, SOLUTION INTRAMUSCULAR; INTRAVENOUS at 07:45

## 2020-09-14 RX ADMIN — FENTANYL CITRATE 50 MCG: 50 INJECTION, SOLUTION INTRAMUSCULAR; INTRAVENOUS at 08:13

## 2020-09-14 RX ADMIN — FAMOTIDINE 20 MG: 10 INJECTION INTRAVENOUS at 07:45

## 2020-09-14 RX ADMIN — VANCOMYCIN HYDROCHLORIDE 1 G: 1 INJECTION, POWDER, LYOPHILIZED, FOR SOLUTION INTRAVENOUS at 08:18

## 2020-09-14 NOTE — H&P
Melina Lan MD  H&P    Patient Care Team:  Lorenzo Vogel MD as PCP - General  Lorenzo Vogel MD as PCP - Family Medicine    Chief complaint  right breast mass    Subjective     Suzanna Jolly  is a 80 y.o. female presents with history of breast cancer status post right lumpectomy with radiation.  She also has a benign biopsy in the right breast.  She is had ongoing changes in the lumpectomy bed.  She is had multiple percutaneous biopsies which have been negative but the area continues to change.  Decision was made for wide excision of this area.      Review of Systems   Pertinent items are noted in HPI, all other systems reviewed and negative    History  Past Medical History:   Diagnosis Date   • Anemia, unspecified    • Arthritis    • Biliary stones    • Bone/cartilage disorder    • Bronchitis    • Chronic obstructive asthma with status asthmaticus (CMS/HCC)    • History of bone density study     DR. VOGEL   • Hx of radiation therapy    • Hypertension    • Malignant neoplasm of upper-outer quadrant of right female breast (CMS/HCC) 2013    RIGHT SIDED BREAST CANCER WITH LUMPECTOMY   • Osteoporosis    • PONV (postoperative nausea and vomiting)    • Pure hypercholesterolemia    • Type 2 diabetes mellitus without complications (CMS/HCC)    • Urinary incontinence      Past Surgical History:   Procedure Laterality Date   • BREAST BIOPSY Right 11/11/2013    cancer   • BREAST BIOPSY Right 1972     with Benign findings   • BREAST BIOPSY Right 2018    benign   • BREAST LUMPECTOMY Right 12/03/2013   • BREAST LUMPECTOMY      RIGHT   • CHOLECYSTECTOMY     • COLONOSCOPY  2010     Dr. Gardiner. facility used [facility]   • ERCP AND ENDOSCOPY     • FOOT SURGERY  09/2014     Dr. Sherri Saha in [facility]   • MAMMO BILATERAL  08/19/2014   • PAP SMEAR     • SKIN BIOPSY Right 11/16/2013    FOOT AND LEG; BENIGN FINDINGS   • TUBAL ABDOMINAL LIGATION       Family History   Problem Relation Age of Onset   • Stroke Brother     • No Known Problems Daughter    • No Known Problems Son    • Other Brother         hx of many comorbidities   • Down syndrome Brother    • Stroke Mother    • Pneumonia Father    • Stroke Father    • No Known Problems Sister    • No Known Problems Maternal Grandmother    • No Known Problems Paternal Grandmother    • No Known Problems Maternal Aunt    • No Known Problems Paternal Aunt    • Breast cancer Neg Hx    • BRCA 1/2 Neg Hx    • Colon cancer Neg Hx    • Endometrial cancer Neg Hx    • Ovarian cancer Neg Hx      Social History     Tobacco Use   • Smoking status: Never Smoker   • Smokeless tobacco: Never Used   Substance Use Topics   • Alcohol use: No   • Drug use: No     Medications Prior to Admission   Medication Sig Dispense Refill Last Dose   • aspirin 81 MG EC tablet Take 81 mg by mouth Daily.   9/4/2020   • B Complex-C (SUPER B COMPLEX PO) Take 1 tablet by mouth Daily.   9/11/2020   • BREO ELLIPTA 100-25 MCG/INH aerosol powder  INHALE 1 PUFF BY MOUTH EVERY DAY IN THE MORNING **RINSE MOUTH AFTER USE**  6 9/13/2020 at 1000   • Calcium Carbonate (CALCIUM 500 PO) Take 500 mg by mouth Daily.   9/11/2020   • Dexlansoprazole (DEXILANT PO) Take 60 mg by mouth Daily.   9/13/2020 at 1830   • IPRATROPIUM BROMIDE NA 2 sprays into the nostril(s) as directed by provider Daily.   9/13/2020 at Unknown time   • levocetirizine (XYZAL) 5 MG tablet Take 5 mg by mouth Daily.   9/13/2020 at 2300   • metFORMIN (GLUCOPHAGE) 500 MG tablet Take 500 mg by mouth Daily With Breakfast.   9/13/2020 at 1030   • montelukast (SINGULAIR) 10 MG tablet Take 10 mg by mouth Daily.   9/13/2020 at 2300   • rosuvastatin (CRESTOR) 10 MG tablet Take 10 mg by mouth Daily.   9/13/2020 at 2300   • triamterene-hydrochlorothiazide (DYAZIDE) 37.5-25 MG per capsule Take 1 capsule by mouth Daily.   9/13/2020 at 1800   • ursodiol (ACTIGALL) 250 MG tablet Take 250 mg by mouth Daily.  5 9/13/2020 at 1000   • vitamin E 400 UNIT capsule Take 400 Units by mouth  Daily.   9/10/2020   • Multiple Vitamins-Minerals (MULTIVITAMIN WITH MINERALS) tablet tablet Take 1 tablet by mouth Daily.   9/11/2020     Allergies:  Septra [sulfamethoxazole-trimethoprim], Ceftin [cefuroxime], Cefuroxime axetil, Ciprofloxacin, and Keflex [cephalexin]    Objective     Vital Signs  Temp:  [97.2 °F (36.2 °C)] 97.2 °F (36.2 °C)  Heart Rate:  [86] 86  BP: (155)/(77) 155/77    Physical Exam:      General Appearance:    Alert, cooperative, in no acute distress   Head:    Normocephalic, without obvious abnormality, atraumatic   Eyes:            Lids and lashes normal, conjunctivae and sclerae normal, no   icterus, no pallor, corneas clear, PERRLA   Ears:    Ears appear intact with no abnormalities noted   Neck:   No adenopathy, supple, trachea midline   Back:     No kyphosis present, no scoliosis present, no skin lesions,      erythema or scars, no tenderness to percussion or                   palpation,   range of motion normal   Lungs:     Clear to auscultation,respirations regular, even and                  unlabored    Heart:    Regular rhythm and normal rate, normal S1 and S2, no            murmur, no gallop, no rub, no click   Chest Wall:    No abnormalities observed   Abdomen:    Soft   Rectal:     Deferred   Extremities:   Moves all extremities well, no edema, no cyanosis, no             redness   Pulses:   Pulses palpable and equal bilaterally   Skin:   No bleeding, bruising or rash   Lymph nodes:   No palpable adenopathy   Neurologic:   No focal deficits       Results Review:      Lab Results (last 72 hours)     Procedure Component Value Units Date/Time    POC Glucose Once [[id number]]  (Abnormal) Collected: 09/14/20 0658    Specimen: Blood Updated: 09/14/20 0710     Glucose 133 mg/dL         Imaging Results (Last 72 Hours)     ** No results found for the last 72 hours. **          Assessment/Plan       * No active hospital problems. *      We will proceed with excisional biopsy of right breast  mass.  Risks benefits alternatives have been discussed in the office      Melina Lan MD  09/14/20  08:04 CDT

## 2020-09-14 NOTE — ANESTHESIA PREPROCEDURE EVALUATION
Anesthesia Evaluation     Patient summary reviewed and Nursing notes reviewed   history of anesthetic complications: PONV  NPO Solid Status: > 8 hours  NPO Liquid Status: > 8 hours           Airway   Mallampati: I  TM distance: >3 FB  Neck ROM: full  No difficulty expected  Dental      Pulmonary    (+) asthma (uses inhaler daily, no recent issues),  (-) sleep apnea, not a smoker  Cardiovascular   Exercise tolerance: good (4-7 METS)    ECG reviewed    (+) hypertension, hyperlipidemia,       Neuro/Psych  (-) seizures, TIA, CVA  GI/Hepatic/Renal/Endo    (+)   diabetes mellitus well controlled,   (-) liver disease, no renal disease    Musculoskeletal     Abdominal    Substance History      OB/GYN          Other   arthritis,    history of cancer (breast cancer) active                    Anesthesia Plan    ASA 2     general     intravenous induction     Anesthetic plan, all risks, benefits, and alternatives have been provided, discussed and informed consent has been obtained with: patient.

## 2020-09-14 NOTE — ANESTHESIA POSTPROCEDURE EVALUATION
Patient: Suzanna Jolly    Procedure Summary     Date: 09/14/20 Room / Location:  [facility] OR  /  [facility]    Anesthesia Start: 0813 Anesthesia Stop: 0856    Procedure: WIDE EXCISION RIGHT BREAST NODULE (Right Breast) Diagnosis: (RIGHT BREAST NODULE)    Surgeon: Melina Lan MD Provider: Peng Malone CRNA    Anesthesia Type: general ASA Status: 2          Anesthesia Type: general    Vitals  Vitals Value Taken Time   /75 09/14/20 0930   Temp 97.7 °F (36.5 °C) 09/14/20 0855   Pulse 86 09/14/20 0934   Resp 14 09/14/20 0930   SpO2 99 % 09/14/20 0934   Vitals shown include unvalidated device data.        Post Anesthesia Care and Evaluation    Patient location during evaluation: PACU  Patient participation: complete - patient participated  Level of consciousness: awake and alert  Pain management: adequate  Airway patency: patent  Anesthetic complications: No anesthetic complications    Cardiovascular status: acceptable  Respiratory status: acceptable  Hydration status: acceptable    Comments: Blood pressure 155/70, pulse 81, temperature 97.7 °F (36.5 °C), temperature source Temporal, resp. rate 14, SpO2 97 %, not currently breastfeeding.    Pt discharged from PACU based on britt score >8  No anesthesia care post op

## 2020-09-14 NOTE — DISCHARGE INSTRUCTIONS

## 2020-09-14 NOTE — ANESTHESIA PROCEDURE NOTES
Airway  Urgency: elective    Date/Time: 9/14/2020 8:18 AM  Airway not difficult    General Information and Staff    Patient location during procedure: OR  CRNA: Peng Malone CRNA    Indications and Patient Condition  Indications for airway management: airway protection    Preoxygenated: yes  MILS not maintained throughout  Mask difficulty assessment: 0 - not attempted    Final Airway Details  Final airway type: endotracheal airway      Successful airway: ETT  Cuffed: yes   Successful intubation technique: direct laryngoscopy  Facilitating devices/methods: intubating stylet  Endotracheal tube insertion site: oral  Blade: Diehl  Blade size: 2  ETT size (mm): 7.5  Cormack-Lehane Classification: grade I - full view of glottis  Placement verified by: chest auscultation   Measured from: lips  ETT/EBT  to lips (cm): 22  Number of attempts at approach: 1  Assessment: lips, teeth, and gum same as pre-op and atraumatic intubation

## 2020-09-15 LAB
CYTO UR: NORMAL
LAB AP CASE REPORT: NORMAL
PATH REPORT.FINAL DX SPEC: NORMAL
PATH REPORT.GROSS SPEC: NORMAL

## 2020-09-25 ENCOUNTER — TRANSCRIBE ORDERS (OUTPATIENT)
Dept: ADMINISTRATIVE | Facility: HOSPITAL | Age: 80
End: 2020-09-25

## 2020-09-25 DIAGNOSIS — N63.0 LUMP IN FEMALE BREAST: Primary | ICD-10-CM

## 2020-09-25 DIAGNOSIS — R92.8 ABNORMAL MAMMOGRAM: ICD-10-CM

## 2020-10-13 DIAGNOSIS — D05.11 DUCTAL CARCINOMA IN SITU (DCIS) OF RIGHT BREAST: Primary | ICD-10-CM

## 2020-10-14 ENCOUNTER — LAB (OUTPATIENT)
Dept: LAB | Facility: HOSPITAL | Age: 80
End: 2020-10-14

## 2020-10-14 ENCOUNTER — OFFICE VISIT (OUTPATIENT)
Dept: ONCOLOGY | Facility: CLINIC | Age: 80
End: 2020-10-14

## 2020-10-14 VITALS
HEART RATE: 100 BPM | WEIGHT: 153.5 LBS | TEMPERATURE: 97.7 F | SYSTOLIC BLOOD PRESSURE: 132 MMHG | HEIGHT: 62 IN | BODY MASS INDEX: 28.25 KG/M2 | DIASTOLIC BLOOD PRESSURE: 78 MMHG | OXYGEN SATURATION: 96 % | RESPIRATION RATE: 16 BRPM

## 2020-10-14 DIAGNOSIS — D05.11 DUCTAL CARCINOMA IN SITU (DCIS) OF RIGHT BREAST: Primary | ICD-10-CM

## 2020-10-14 DIAGNOSIS — D05.11 DUCTAL CARCINOMA IN SITU (DCIS) OF RIGHT BREAST: ICD-10-CM

## 2020-10-14 LAB
ALBUMIN SERPL-MCNC: 4.3 G/DL (ref 3.5–5.2)
ALBUMIN/GLOB SERPL: 1.8 G/DL
ALP SERPL-CCNC: 61 U/L (ref 39–117)
ALT SERPL W P-5'-P-CCNC: 14 U/L (ref 1–33)
ANION GAP SERPL CALCULATED.3IONS-SCNC: 10 MMOL/L (ref 5–15)
AST SERPL-CCNC: 23 U/L (ref 1–32)
BASOPHILS # BLD AUTO: 0.04 10*3/MM3 (ref 0–0.2)
BASOPHILS NFR BLD AUTO: 0.5 % (ref 0–1.5)
BILIRUB SERPL-MCNC: 0.3 MG/DL (ref 0–1.2)
BUN SERPL-MCNC: 12 MG/DL (ref 8–23)
BUN/CREAT SERPL: 20 (ref 7–25)
CALCIUM SPEC-SCNC: 9.4 MG/DL (ref 8.6–10.5)
CHLORIDE SERPL-SCNC: 102 MMOL/L (ref 98–107)
CO2 SERPL-SCNC: 25 MMOL/L (ref 22–29)
CREAT SERPL-MCNC: 0.6 MG/DL (ref 0.57–1)
DEPRECATED RDW RBC AUTO: 40.5 FL (ref 37–54)
EOSINOPHIL # BLD AUTO: 0.17 10*3/MM3 (ref 0–0.4)
EOSINOPHIL NFR BLD AUTO: 2.2 % (ref 0.3–6.2)
ERYTHROCYTE [DISTWIDTH] IN BLOOD BY AUTOMATED COUNT: 11.9 % (ref 12.3–15.4)
GFR SERPL CREATININE-BSD FRML MDRD: 96 ML/MIN/1.73
GLOBULIN UR ELPH-MCNC: 2.4 GM/DL
GLUCOSE SERPL-MCNC: 160 MG/DL (ref 65–99)
HCT VFR BLD AUTO: 38.9 % (ref 34–46.6)
HGB BLD-MCNC: 13.9 G/DL (ref 12–15.9)
HOLD SPECIMEN: NORMAL
HOLD SPECIMEN: NORMAL
IMM GRANULOCYTES # BLD AUTO: 0.02 10*3/MM3 (ref 0–0.05)
IMM GRANULOCYTES NFR BLD AUTO: 0.3 % (ref 0–0.5)
LYMPHOCYTES # BLD AUTO: 1.03 10*3/MM3 (ref 0.7–3.1)
LYMPHOCYTES NFR BLD AUTO: 13.2 % (ref 19.6–45.3)
MCH RBC QN AUTO: 33.3 PG (ref 26.6–33)
MCHC RBC AUTO-ENTMCNC: 35.7 G/DL (ref 31.5–35.7)
MCV RBC AUTO: 93.1 FL (ref 79–97)
MONOCYTES # BLD AUTO: 0.43 10*3/MM3 (ref 0.1–0.9)
MONOCYTES NFR BLD AUTO: 5.5 % (ref 5–12)
NEUTROPHILS NFR BLD AUTO: 6.13 10*3/MM3 (ref 1.7–7)
NEUTROPHILS NFR BLD AUTO: 78.3 % (ref 42.7–76)
NRBC BLD AUTO-RTO: 0 /100 WBC (ref 0–0.2)
PLATELET # BLD AUTO: 239 10*3/MM3 (ref 140–450)
PMV BLD AUTO: 9.4 FL (ref 6–12)
POTASSIUM SERPL-SCNC: 4.2 MMOL/L (ref 3.5–5.2)
PROT SERPL-MCNC: 6.7 G/DL (ref 6–8.5)
RBC # BLD AUTO: 4.18 10*6/MM3 (ref 3.77–5.28)
SODIUM SERPL-SCNC: 137 MMOL/L (ref 136–145)
WBC # BLD AUTO: 7.82 10*3/MM3 (ref 3.4–10.8)

## 2020-10-14 PROCEDURE — 99214 OFFICE O/P EST MOD 30 MIN: CPT | Performed by: NURSE PRACTITIONER

## 2020-10-14 PROCEDURE — 85025 COMPLETE CBC W/AUTO DIFF WBC: CPT

## 2020-10-14 PROCEDURE — 80053 COMPREHEN METABOLIC PANEL: CPT

## 2020-10-14 PROCEDURE — 36415 COLL VENOUS BLD VENIPUNCTURE: CPT

## 2020-10-14 NOTE — PROGRESS NOTES
[facility]  HEMATOLOGY & ONCOLOGY    [facility] [place] [facility] [place]EMATOLOGY AND ONCOLOGY  [address]  [place] [facility] [place] [address] [phone]    Patient Name: Suzanna Jolly  Encounter Date: 10/14/2020  YOB: 1940  Patient Number: [id number]    Chief Complaint   Patient presents with   • Breast Cancer     Here for f/u. Had a lumpectomy a few weeks ago but was nothing found, just dead tissue.       REASON FOR VISIT: Ms. Jolly is a 79 yo patient here today in follow up for ductal carcinoma in situ of the right breast.  She was diagnosed in 2013 and underwent lumpectomy.  This was followed by radiation therapy and then tamoxifen for 5 years.  She has done well and has shown no sign of recurrence.     Labs today show a WBC of 7.82, Hgb 13.9 and Plt count of 239,000.  CMP is normal except for 160 which was nonfasting.     She is feeling well and having no problems except she had to have a recent lump removed from the incision site at the breast cancer site in 2013.  US confirmed the lesion and therefore Dr Siu discussed it with the patient and they decided to complete another lumpectomy.  Pathology on 9/14/20 shows no malignancy just fat necrosis. It is healing well, just sore.     PAST MEDICAL HISTORY:  ALLERGIES:  Allergies   Allergen Reactions   • Septra [Sulfamethoxazole-Trimethoprim] Hives and Nausea And Vomiting   • Ceftin [Cefuroxime] Hives and GI Intolerance   • Cefuroxime Axetil Hives and Nausea And Vomiting   • Ciprofloxacin Hives, Nausea And Vomiting and Rash   • Keflex [Cephalexin] Hives, Nausea And Vomiting and GI Intolerance   • Hydrocodone Rash     Rash and flushed         CURRENT MEDICATIONS:  Outpatient Encounter Medications as of 10/14/2020   Medication Sig Dispense Refill   • aspirin 81 MG EC tablet Take 81 mg by mouth Daily.     • B Complex-C (SUPER B COMPLEX PO) Take 1 tablet by mouth Daily.     • BREO ELLIPTA  100-25 MCG/INH aerosol powder  INHALE 1 PUFF BY MOUTH EVERY DAY IN THE MORNING **RINSE MOUTH AFTER USE**  6   • Calcium Carbonate (CALCIUM 500 PO) Take 500 mg by mouth Daily.     • Dexlansoprazole (DEXILANT PO) Take 60 mg by mouth Daily.     • IPRATROPIUM BROMIDE NA 2 sprays into the nostril(s) as directed by provider Daily.     • levocetirizine (XYZAL) 5 MG tablet Take 5 mg by mouth Daily.     • metFORMIN (GLUCOPHAGE) 500 MG tablet Take 500 mg by mouth Daily With Breakfast.     • montelukast (SINGULAIR) 10 MG tablet Take 10 mg by mouth Daily.     • Multiple Vitamins-Minerals (MULTIVITAMIN WITH MINERALS) tablet tablet Take 1 tablet by mouth Daily.     • rosuvastatin (CRESTOR) 10 MG tablet Take 10 mg by mouth Daily.     • triamterene-hydrochlorothiazide (DYAZIDE) 37.5-25 MG per capsule Take 1 capsule by mouth Daily.     • ursodiol (ACTIGALL) 250 MG tablet Take 250 mg by mouth Daily.  5   • vitamin E 400 UNIT capsule Take 400 Units by mouth Daily.     • [DISCONTINUED] HYDROcodone-acetaminophen (NORCO) 7.5-325 MG per tablet Take 1 tablet by mouth Every 4 (Four) Hours As Needed for Moderate Pain  (Pain). 15 tablet 0     No facility-administered encounter medications on file as of 10/14/2020.      ADULT ILLNESSES:  Patient Active Problem List   Diagnosis Code   • Sepsis (CMS/HCC) A41.9   • Acute colitis K52.9   • Nausea R11.0   • Abnormal liver function R94.5   • Ductal carcinoma in situ (DCIS) of right breast D05.11     SURGERIES:  Past Surgical History:   Procedure Laterality Date   • BREAST BIOPSY Right 11/11/2013    cancer   • BREAST BIOPSY Right 1972     with Benign findings   • BREAST BIOPSY Right 2018    benign   • BREAST BIOPSY Right 9/14/2020    Procedure: WIDE EXCISION RIGHT BREAST NODULE;  Surgeon: Melina Lan MD;  Location: [facility];  Service: General;  Laterality: Right;   • BREAST LUMPECTOMY Right 12/03/2013   • BREAST LUMPECTOMY      RIGHT   • CHOLECYSTECTOMY     • COLONOSCOPY  2010      Dr. Gardiner. facility used [facility]   • ERCP AND ENDOSCOPY     • FOOT SURGERY  09/2014     Dr. Sherri Saha in [facility]   • MAMMO BILATERAL  08/19/2014   • PAP SMEAR     • SKIN BIOPSY Right 11/16/2013    FOOT AND LEG; BENIGN FINDINGS   • TUBAL ABDOMINAL LIGATION       HEALTH MAINTENANCE ITEMS:    Last Completed Colonoscopy     November 2011 - negative          There is no immunization history on file for this patient.  Last Completed Mammogram       Status Date      MAMMOGRAM Done 8/31/2020 MAMMO SCREENING DIGITAL TOMOSYNTHESIS BILATERAL W CAD     8/31/2020 - There is a nodular density adjacent to the lumpectomy site.            FAMILY HISTORY:  Family History   Problem Relation Age of Onset   • Stroke Brother    • No Known Problems Daughter    • No Known Problems Son    • Other Brother         hx of many comorbidities   • Down syndrome Brother    • Stroke Mother    • Pneumonia Father    • Stroke Father    • No Known Problems Sister    • No Known Problems Maternal Grandmother    • No Known Problems Paternal Grandmother    • No Known Problems Maternal Aunt    • No Known Problems Paternal Aunt    • Breast cancer Neg Hx    • BRCA 1/2 Neg Hx    • Colon cancer Neg Hx    • Endometrial cancer Neg Hx    • Ovarian cancer Neg Hx      SOCIAL HISTORY:  Social History     Socioeconomic History   • Marital status:      Spouse name: Not on file   • Number of children: Not on file   • Years of education: Not on file   • Highest education level: Not on file   Tobacco Use   • Smoking status: Never Smoker   • Smokeless tobacco: Never Used   Substance and Sexual Activity   • Alcohol use: No   • Drug use: No   • Sexual activity: Defer       REVIEW OF SYSTEMS:  Review of Systems   Constitutional: Negative for activity change, appetite change, chills, diaphoresis, fatigue, fever and unexpected weight loss.   HENT: Negative for ear pain, nosebleeds, sinus pressure, sore throat and voice change.    Eyes: Negative for blurred  "vision, double vision, pain and visual disturbance.   Respiratory: Negative for cough and shortness of breath.    Cardiovascular: Negative for chest pain, palpitations and leg swelling.   Gastrointestinal: Negative for abdominal pain, anal bleeding, blood in stool, constipation, diarrhea, nausea and vomiting.   Endocrine: Negative for heat intolerance, polydipsia and polyuria.   Genitourinary: Negative for dysuria, frequency, hematuria, urgency and urinary incontinence.   Musculoskeletal: Positive for arthralgias. Negative for myalgias.   Skin: Negative for rash and skin lesions.   Neurological: Negative for dizziness, tremors, seizures, syncope, speech difficulty, weakness and headache.   Hematological: Negative for adenopathy. Does not bruise/bleed easily.   Psychiatric/Behavioral: Negative for dysphoric mood, sleep disturbance, suicidal ideas and depressed mood.       /78   Pulse 100   Temp 97.7 °F (36.5 °C) (Temporal)   Resp 16   Ht 157.5 cm (62\")   Wt 69.6 kg (153 lb 8 oz)   LMP  (LMP Unknown)   SpO2 96%   Breastfeeding No   BMI 28.08 kg/m²  Body surface area is 1.71 meters squared.  Pain Score    10/14/20 1408   PainSc: 0-No pain       Physical Exam:  Physical Exam  Constitutional:       Appearance: Normal appearance. She is well-developed.   HENT:      Head: Atraumatic.   Eyes:      General: No scleral icterus.     Pupils: Pupils are equal, round, and reactive to light.   Neck:      Musculoskeletal: Neck supple.      Vascular: No JVD.      Trachea: Trachea normal.   Cardiovascular:      Rate and Rhythm: Normal rate and regular rhythm.      Pulses: Normal pulses.      Heart sounds: No murmur. No friction rub. No gallop.    Pulmonary:      Effort: Pulmonary effort is normal.      Breath sounds: Normal breath sounds. No wheezing, rhonchi or rales.   Chest:      Breasts:         Left: Normal.      Comments: Right breast with surgical change noted  Abdominal:      Palpations: Abdomen is soft.      " Tenderness: There is no abdominal tenderness. There is no guarding or rebound.   Lymphadenopathy:      Cervical: No cervical adenopathy.      Upper Body:      Right upper body: No supraclavicular adenopathy.      Left upper body: No supraclavicular adenopathy.   Neurological:      Mental Status: She is alert and oriented to person, place, and time.      Sensory: No sensory deficit.   Psychiatric:         Judgment: Judgment normal.         Suzanna Jolly reports a pain score of 0.   Patient's Body mass index is 28.08 kg/m². BMI is above normal parameters. Recommendations include: defer to pcp.      LABS    Lab Results - Last 18 Months   Lab Units 10/14/20  1357 09/11/20  1010 08/21/20  0951 10/21/19  0951 08/09/19  1057 06/18/19  1012   HEMOGLOBIN g/dL 13.9 14.0 14.9 14.9 14.2 14.2   HEMATOCRIT % 38.9 39.4 43.0 43.2 39.8 41.7   MCV fL 93.1 93.6 96.0 96.2 93.6 96.8   WBC 10*3/mm3 7.82 8.24 7.7 7.5 6.46 7.2   RDW % 11.9* 11.9* 11.9 11.9 11.9* 11.7   MPV fL 9.4 10.2 10.4 9.8 9.6 9.8   PLATELETS 10*3/mm3 239 212 214 222 202 195   IMM GRAN % % 0.3 0.4  --   --  0.5  --    NEUTROS ABS 10*3/mm3 6.13 6.61 5.7 5.2 4.52 5.0   LYMPHS ABS 10*3/mm3 1.03 0.93 1.1 1.4 1.27 1.4   MONOS ABS 10*3/mm3 0.43 0.49 0.60 0.50 0.47 0.50   EOS ABS 10*3/mm3 0.17 0.13 0.20 0.20 0.13 0.20   BASOS ABS 10*3/mm3 0.04 0.05 0.00 0.10 0.04 0.00   IMMATURE GRANS (ABS) 10*3/mm3 0.02 0.03 0.0 0.0 0.03  --    NRBC /100 WBC 0.0 0.0  --   --  0.0  --        Lab Results - Last 18 Months   Lab Units 10/14/20  1357 09/11/20  1010 08/21/20  0951 02/24/20  0946 08/09/19  1057 06/18/19  1012   GLUCOSE mg/dL 160* 140* 120* 126* 122* 108   SODIUM mmol/L 137 140 141 141 138 142   POTASSIUM mmol/L 4.2 4.0 4.1 4.2 4.0 4.2   TOTAL CO2 mmol/L  --   --  28 26  --  27   CO2 mmol/L 25.0 25.0  --   --  28.0  --    CHLORIDE mmol/L 102 105 102 101 105 100   ANION GAP mmol/L 10.0 10.0 11 14 5.0 15   CREATININE mg/dL 0.60 0.77 0.9 0.8 0.66 0.7   BUN mg/dL 12 16 20 13 14 16    BUN / CREAT RATIO  20.0 20.8  --   --  21.2  --    CALCIUM mg/dL 9.4 9.1 9.7 9.3 9.3 9.4   EGFR IF NONAFRICN AM mL/min/1.73 96 72 >60 >60 86 >60   ALK PHOS U/L 61 61  --   --  45 45   TOTAL PROTEIN g/dL 6.7 6.9  --   --  6.8 6.7   ALT (SGPT) U/L 14 14  --   --  25 17   AST (SGOT) U/L 23 21  --   --  33 23   BILIRUBIN mg/dL 0.3 0.3  --   --  0.5 0.4   ALBUMIN g/dL 4.30 4.40  --   --  4.10 4.2   GLOBULIN gm/dL 2.4 2.5  --   --  2.7  --        Lab Results - Last 18 Months   Lab Units 10/21/19  0951   TSH uIU/mL 4.970*     ASSESSMENT  1. Right breast ducta carcinoma in situ diagnosed in 2013  · Initial Stage: AJCC TNM [id number], stage 0  · Treatment: Lumpectomy followed by adjuvant radiation therapy.  She then took TMX for 5 years which was completed in August 2019.  · Disease status: ELIZABETH  2.  Osteoporosis, BMD in 2016.  Has been on Boniva, calcium + D.  Managed by PCP  3.  Leukocytosis now resolved, previous flow negative  4.  Type 2 DM stable on metformin  5.  Hematologically stable with normal cbc and cmp  6.  Hypertension controlled. BP at 132/78  7.  Right breast nodule at previous tumor bed site, resected and negative. Path - fat necrosis    PLAN  1.  Counseled patient regarding lab work today and its normalcy  2.  Encouraged patient to continue yearly mammograms  3.  Encouraged patient to continue to follow with pcp and other specialists  4.  Return in 1 year for follow up. Preoffice cbc, cmp    I spent 25 total minutes, face-to-face, caring for Suzanna today.  Greater than 50% of this time involved counseling and/or coordination of care as documented within this note regarding the patient's illness(es), pros and cons of various treatment options, instructions and/or risk reduction.    Janae Malone, APRN  10/14/2020

## 2020-12-07 ENCOUNTER — OFFICE VISIT (OUTPATIENT)
Dept: OBGYN | Age: 80
End: 2020-12-07
Payer: MEDICARE

## 2020-12-07 VITALS
BODY MASS INDEX: 26.75 KG/M2 | HEART RATE: 100 BPM | WEIGHT: 151 LBS | SYSTOLIC BLOOD PRESSURE: 132 MMHG | DIASTOLIC BLOOD PRESSURE: 72 MMHG | HEIGHT: 63 IN

## 2020-12-07 PROBLEM — Z85.3 HISTORY OF BREAST CANCER: Status: ACTIVE | Noted: 2020-12-07

## 2020-12-07 PROCEDURE — G0101 CA SCREEN;PELVIC/BREAST EXAM: HCPCS | Performed by: NURSE PRACTITIONER

## 2020-12-07 PROCEDURE — G8427 DOCREV CUR MEDS BY ELIG CLIN: HCPCS | Performed by: NURSE PRACTITIONER

## 2020-12-07 PROCEDURE — 1090F PRES/ABSN URINE INCON ASSESS: CPT | Performed by: NURSE PRACTITIONER

## 2020-12-07 PROCEDURE — G8399 PT W/DXA RESULTS DOCUMENT: HCPCS | Performed by: NURSE PRACTITIONER

## 2020-12-07 PROCEDURE — G8484 FLU IMMUNIZE NO ADMIN: HCPCS | Performed by: NURSE PRACTITIONER

## 2020-12-07 PROCEDURE — 1123F ACP DISCUSS/DSCN MKR DOCD: CPT | Performed by: NURSE PRACTITIONER

## 2020-12-07 PROCEDURE — 99214 OFFICE O/P EST MOD 30 MIN: CPT | Performed by: NURSE PRACTITIONER

## 2020-12-07 PROCEDURE — 1036F TOBACCO NON-USER: CPT | Performed by: NURSE PRACTITIONER

## 2020-12-07 PROCEDURE — 4040F PNEUMOC VAC/ADMIN/RCVD: CPT | Performed by: NURSE PRACTITIONER

## 2020-12-07 PROCEDURE — G8417 CALC BMI ABV UP PARAM F/U: HCPCS | Performed by: NURSE PRACTITIONER

## 2020-12-07 PROCEDURE — 0509F URINE INCON PLAN DOCD: CPT | Performed by: NURSE PRACTITIONER

## 2020-12-07 ASSESSMENT — ENCOUNTER SYMPTOMS
DIARRHEA: 0
CONSTIPATION: 0
RESPIRATORY NEGATIVE: 1
EYES NEGATIVE: 1
GASTROINTESTINAL NEGATIVE: 1
ALLERGIC/IMMUNOLOGIC NEGATIVE: 1

## 2020-12-07 NOTE — PROGRESS NOTES
Sofia Cuadra is a 79 y.o. female who presents today for her medical conditions/ complaints as noted below. Sofia Cuadra is c/o of Gynecologic Exam        HPI   Pt presents for annual exam. Normal pap in 2019. Normal TVUS in 2019. Sees Dr Darinel Santiago and her oncologist every 6 months. Had recent lumpectomy in August and was benign. Current with screening mammogram.     Mammo:2020  Pap smear:2019  Contraception:Postmenopausal     P:2  Ab:0  Bone density:2016  Colonoscopy:2017  No LMP recorded.  Patient is postmenopausal.      Past Medical History:   Diagnosis Date    Breast cancer in female [facility]) 2013    right    Diabetes ([address]      Past Surgical History:   Procedure Laterality Date    BREAST BIOPSY      BREAST LUMPECTOMY Right 2020    Benign    BREAST LUMPECTOMY  1972    CHOLECYSTECTOMY      COLONOSCOPY      Dr. Kenny Robles, [place]    ERCP      [address]      FOOT SURGERY  2014    TUBAL LIGATION       Family History   Problem Relation Age of Onset    Diabetes Mother     Stroke Father     Hypertension Father     Diabetes Brother     Heart Disease Brother     Stroke Brother      Social History     Tobacco Use    Smoking status: Never Smoker    Smokeless tobacco: Never Used   Substance Use Topics    Alcohol use: No       Current Outpatient Medications   Medication Sig Dispense Refill    ursodiol (ACTIGALL) 250 MG tablet TAKE 1 TABLET BY MOUTH TWICE A DAY  5    dexlansoprazole (DEXILANT) 60 MG CPDR delayed release capsule Take 1 capsule by mouth daily      ipratropium (ATROVENT) 0.06 % nasal spray USE 1-2 SPRAYS EACH NOSTRIL 2-4 TIMES A DAY AS NEEDED  1    aspirin 81 MG tablet Take 81 mg by mouth daily      fluticasone (FLONASE) 50 MCG/ACT nasal spray 1 spray by Nasal route daily      rosuvastatin (CRESTOR) 10 MG tablet Take 10 mg by mouth daily      triamterene-hydrochlorothiazide (MAXZIDE-25) 37.5-25 MG per tablet       QVAR 80 MCG/ACT inhaler       tamoxifen (NOLVADEX) 10 MG tablet       montelukast (SINGULAIR) 10 MG tablet Take 10 mg by mouth nightly      levocetirizine (XYZAL) 5 MG tablet       metFORMIN (GLUCOPHAGE) 500 MG tablet Take 500 mg by mouth daily (with breakfast)       ONETOUCH VERIO strip        No current facility-administered medications for this visit. Allergies   Allergen Reactions    Ceftin [Cefuroxime Axetil] Hives and Nausea And Vomiting    Ciprofloxacin Hives and Nausea And Vomiting    Keftab [Cephalexin] Hives and Nausea And Vomiting    Septra [Sulfamethoxazole-Trimethoprim] Hives and Nausea And Vomiting     Vitals:    12/07/20 1438   BP: 132/72   Pulse: 100     Body mass index is 26.75 kg/m². Review of Systems   Constitutional: Negative. HENT: Negative. Eyes: Negative. Respiratory: Negative. Cardiovascular: Negative. Gastrointestinal: Negative. Negative for constipation and diarrhea. Endocrine: Negative. Genitourinary: Positive for enuresis. Negative for frequency, menstrual problem and urgency. Musculoskeletal: Negative. Skin: Negative. Allergic/Immunologic: Negative. Neurological: Negative. Hematological: Negative. Psychiatric/Behavioral: Negative. All other systems reviewed and are negative. Physical Exam  Vitals signs and nursing note reviewed. Constitutional:       Appearance: She is well-developed. HENT:      Head: Normocephalic. Neck:      Musculoskeletal: Normal range of motion and neck supple. Thyroid: No thyroid mass or thyromegaly. Cardiovascular:      Rate and Rhythm: Normal rate and regular rhythm. Pulmonary:      Effort: Pulmonary effort is normal.      Breath sounds: Normal breath sounds. Chest:      Breasts:         Right: Mass and skin change present. No inverted nipple or nipple discharge. Left: No inverted nipple, mass, nipple discharge or skin change.           Comments: Area of previous lumpectomy x2  Hardened area, approx 5cm  Abdominal:      Palpations: Abdomen is soft. There is no mass. Tenderness: There is no abdominal tenderness. Genitourinary:     General: Normal vulva. Vagina: Normal.      Cervix: No cervical motion tenderness. Uterus: Normal. Not enlarged. Adnexa:         Right: No mass or tenderness. Left: No mass or tenderness. Comments: Pap deferred  Tiny erythematic area  Atrophic changes  Musculoskeletal: Normal range of motion. Skin:     General: Skin is warm and dry. Neurological:      Mental Status: She is alert and oriented to person, place, and time. Psychiatric:         Attention and Perception: Attention normal.         Mood and Affect: Mood normal.         Speech: Speech normal.         Behavior: Behavior normal.         Thought Content: Thought content normal.         Cognition and Memory: Cognition normal.         Judgment: Judgment normal.          Diagnosis Orders   1. GYN exam for high-risk Medicare patient  KS CA SCREEN;PELVIC/BREAST EXAM   2. History of breast cancer  KS CA SCREEN;PELVIC/BREAST EXAM   3. Postmenopausal  DEXA BONE DENSITY AXIAL SKELETON   4. Continuous leakage of urine         MEDICATIONS:  No orders of the defined types were placed in this encounter. ORDERS:  Orders Placed This Encounter   Procedures    DEXA BONE DENSITY AXIAL SKELETON    [place] CA SCREEN;PELVIC/BREAST EXAM       PLAN:  Pap deferred  Patient Instructions   Patient Education        Breast Self-Exam: Care Instructions  Your Care Instructions     A breast self-exam is when you check your breasts for lumps or changes. This regular exam helps you learn how your breasts normally look and feel. Most breast problems or changes are not because of cancer. Breast self-exam is not a substitute for a mammogram. Having regular breast exams by your doctor and regular mammograms improve your chances of finding any problems with your breasts.   Some women set a time each month to do a step-by-step breast self-exam. Other women like a less formal system. They might look at their breasts as they brush their teeth, or feel their breasts once in a while in the shower. If you notice a change in your breast, tell your doctor. Follow-up care is a key part of your treatment and safety. Be sure to make and go to all appointments, and call your doctor if you are having problems. It's also a good idea to know your test results and keep a list of the medicines you take. How do you do a breast self-exam?  · The best time to examine your breasts is usually one week after your menstrual period begins. Your breasts should not be tender then. If you do not have periods, you might do your exam on a day of the month that is easy to remember. · To examine your breasts:  ? Remove all your clothes above the waist and lie down. When you are lying down, your breast tissue spreads evenly over your chest wall, which makes it easier to feel all your breast tissue. ? Use the pads--not the fingertips--of the 3 middle fingers of your left hand to check your right breast. Move your fingers slowly in small coin-sized circles that overlap. ? Use three levels of pressure to feel of all your breast tissue. Use light pressure to feel the tissue close to the skin surface. Use medium pressure to feel a little deeper. Use firm pressure to feel your tissue close to your breastbone and ribs. Use each pressure level to feel your breast tissue before moving on to the next spot. ? Check your entire breast, moving up and down as if following a strip from the collarbone to the bra line, and from the armpit to the ribs. Repeat until you have covered the entire breast.  ? Repeat this procedure for your left breast, using the pads of the 3 middle fingers of your right hand. · To examine your breasts while in the shower:  ? Place one arm over your head and lightly soap your breast on that side. ?  Using the pads

## 2020-12-07 NOTE — PROGRESS NOTES
Pt presents today for pap smear and breast exam.      Mammo:2020  Pap smear:2019  Contraception:Postmenopausal     P:2  Ab:0  Bone density:2016  Colonoscopy:2017

## 2020-12-07 NOTE — PATIENT INSTRUCTIONS
Patient Education        Breast Self-Exam: Care Instructions  Your Care Instructions     A breast self-exam is when you check your breasts for lumps or changes. This regular exam helps you learn how your breasts normally look and feel. Most breast problems or changes are not because of cancer. Breast self-exam is not a substitute for a mammogram. Having regular breast exams by your doctor and regular mammograms improve your chances of finding any problems with your breasts. Some women set a time each month to do a step-by-step breast self-exam. Other women like a less formal system. They might look at their breasts as they brush their teeth, or feel their breasts once in a while in the shower. If you notice a change in your breast, tell your doctor. Follow-up care is a key part of your treatment and safety. Be sure to make and go to all appointments, and call your doctor if you are having problems. It's also a good idea to know your test results and keep a list of the medicines you take. How do you do a breast self-exam?  · The best time to examine your breasts is usually one week after your menstrual period begins. Your breasts should not be tender then. If you do not have periods, you might do your exam on a day of the month that is easy to remember. · To examine your breasts:  ? Remove all your clothes above the waist and lie down. When you are lying down, your breast tissue spreads evenly over your chest wall, which makes it easier to feel all your breast tissue. ? Use the pads--not the fingertips--of the 3 middle fingers of your left hand to check your right breast. Move your fingers slowly in small coin-sized circles that overlap. ? Use three levels of pressure to feel of all your breast tissue. Use light pressure to feel the tissue close to the skin surface. Use medium pressure to feel a little deeper. Use firm pressure to feel your tissue close to your breastbone and ribs.  Use each pressure level to feel your breast tissue before moving on to the next spot. ? Check your entire breast, moving up and down as if following a strip from the collarbone to the bra line, and from the armpit to the ribs. Repeat until you have covered the entire breast.  ? Repeat this procedure for your left breast, using the pads of the 3 middle fingers of your right hand. · To examine your breasts while in the shower:  ? Place one arm over your head and lightly soap your breast on that side. ? Using the pads of your fingers, gently move your hand over your breast (in the strip pattern described above), feeling carefully for any lumps or changes. ? Repeat for the other breast.  · Have your doctor inspect anything you notice to see if you need further testing. Where can you learn more? Go to [email or web address] and sign in to your [employer] account. Enter P148 in the TweetDeck box to learn more about \"Breast Self-Exam: Care Instructions. \"     If you do not have an account, please click on the \"Sign Up Now\" link. Current as of: April 29, 2020               Content Version: 12.6  © 5667-6460 [employer]. Care instructions adapted under license by [facility] ([facility]). If you have questions about a medical condition or this instruction, always ask your healthcare professional. [address] any warranty or liability for your use of this information.

## 2021-01-26 LAB
ALBUMIN SERPL-MCNC: 4.2 G/DL (ref 3.5–5.2)
ALP BLD-CCNC: 64 U/L (ref 35–104)
ALT SERPL-CCNC: 16 U/L (ref 5–33)
AST SERPL-CCNC: 22 U/L (ref 5–32)
BILIRUB SERPL-MCNC: 0.3 MG/DL (ref 0.2–1.2)
BILIRUBIN DIRECT: 0.1 MG/DL (ref 0–0.3)
BILIRUBIN, INDIRECT: 0.2 MG/DL (ref 0.1–1)
TOTAL PROTEIN: 6.9 G/DL (ref 6.6–8.7)

## 2021-02-12 ENCOUNTER — IMMUNIZATION (OUTPATIENT)
Dept: VACCINE CLINIC | Facility: HOSPITAL | Age: 81
End: 2021-02-12

## 2021-02-12 PROCEDURE — 0011A: CPT | Performed by: OBSTETRICS & GYNECOLOGY

## 2021-02-12 PROCEDURE — 91301 HC SARSCO02 VAC 100MCG/0.5ML IM: CPT | Performed by: OBSTETRICS & GYNECOLOGY

## 2021-02-23 LAB
ANION GAP SERPL CALCULATED.3IONS-SCNC: 13 MMOL/L (ref 7–19)
BUN BLDV-MCNC: 15 MG/DL (ref 8–23)
CALCIUM SERPL-MCNC: 9.5 MG/DL (ref 8.8–10.2)
CHLORIDE BLD-SCNC: 103 MMOL/L (ref 98–111)
CHOLESTEROL, TOTAL: 137 MG/DL (ref 160–199)
CO2: 27 MMOL/L (ref 22–29)
CREAT SERPL-MCNC: 0.7 MG/DL (ref 0.5–0.9)
GFR AFRICAN AMERICAN: >59
GFR NON-AFRICAN AMERICAN: >60
GLUCOSE BLD-MCNC: 107 MG/DL (ref 74–109)
HBA1C MFR BLD: 5.9 % (ref 4–6)
HDLC SERPL-MCNC: 80 MG/DL (ref 65–121)
LDL CHOLESTEROL CALCULATED: 39 MG/DL
POTASSIUM SERPL-SCNC: 4.1 MMOL/L (ref 3.5–5)
SODIUM BLD-SCNC: 143 MMOL/L (ref 136–145)
TRIGL SERPL-MCNC: 89 MG/DL (ref 0–149)

## 2021-03-12 ENCOUNTER — IMMUNIZATION (OUTPATIENT)
Dept: VACCINE CLINIC | Facility: HOSPITAL | Age: 81
End: 2021-03-12

## 2021-03-12 PROCEDURE — 91301 HC SARSCO02 VAC 100MCG/0.5ML IM: CPT | Performed by: OBSTETRICS & GYNECOLOGY

## 2021-03-12 PROCEDURE — 0012A: CPT | Performed by: OBSTETRICS & GYNECOLOGY

## 2021-04-26 ENCOUNTER — HOSPITAL ENCOUNTER (OUTPATIENT)
Dept: MAMMOGRAPHY | Facility: HOSPITAL | Age: 81
Discharge: HOME | End: 2021-04-26
Admitting: SPECIALIST

## 2021-04-26 PROCEDURE — 77065 DX MAMMO INCL CAD UNI: CPT

## 2021-04-26 PROCEDURE — G0279 TOMOSYNTHESIS, MAMMO: HCPCS

## 2021-05-10 ENCOUNTER — TRANSCRIBE ORDERS (OUTPATIENT)
Dept: ADMINISTRATIVE | Facility: HOSPITAL | Age: 81
End: 2021-05-10

## 2021-05-10 DIAGNOSIS — Z12.31 OTHER SCREENING MAMMOGRAM: Primary | ICD-10-CM

## 2021-08-11 LAB
ALBUMIN SERPL-MCNC: 4.6 G/DL (ref 3.5–5.2)
ALP BLD-CCNC: 61 U/L (ref 35–104)
ALT SERPL-CCNC: 14 U/L (ref 5–33)
ANION GAP SERPL CALCULATED.3IONS-SCNC: 10 MMOL/L (ref 7–19)
AST SERPL-CCNC: 22 U/L (ref 5–32)
BASOPHILS ABSOLUTE: 0.1 K/UL (ref 0–0.2)
BASOPHILS RELATIVE PERCENT: 0.8 % (ref 0–1)
BILIRUB SERPL-MCNC: 0.6 MG/DL (ref 0.2–1.2)
BUN BLDV-MCNC: 14 MG/DL (ref 8–23)
CALCIUM SERPL-MCNC: 9.9 MG/DL (ref 8.8–10.2)
CHLORIDE BLD-SCNC: 103 MMOL/L (ref 98–111)
CHOLESTEROL, TOTAL: 143 MG/DL (ref 160–199)
CO2: 28 MMOL/L (ref 22–29)
CREAT SERPL-MCNC: 0.7 MG/DL (ref 0.5–0.9)
EOSINOPHILS ABSOLUTE: 0.2 K/UL (ref 0–0.6)
EOSINOPHILS RELATIVE PERCENT: 2.3 % (ref 0–5)
GFR AFRICAN AMERICAN: >59
GFR NON-AFRICAN AMERICAN: >60
GLUCOSE BLD-MCNC: 107 MG/DL (ref 74–109)
HBA1C MFR BLD: 5.6 % (ref 4–6)
HCT VFR BLD CALC: 43.9 % (ref 37–47)
HDLC SERPL-MCNC: 71 MG/DL (ref 65–121)
HEMOGLOBIN: 14.8 G/DL (ref 12–16)
IMMATURE GRANULOCYTES #: 0 K/UL
LDL CHOLESTEROL CALCULATED: 52 MG/DL
LYMPHOCYTES ABSOLUTE: 1.4 K/UL (ref 1.1–4.5)
LYMPHOCYTES RELATIVE PERCENT: 19 % (ref 20–40)
MCH RBC QN AUTO: 33 PG (ref 27–31)
MCHC RBC AUTO-ENTMCNC: 33.7 G/DL (ref 33–37)
MCV RBC AUTO: 97.8 FL (ref 81–99)
MONOCYTES ABSOLUTE: 0.5 K/UL (ref 0–0.9)
MONOCYTES RELATIVE PERCENT: 7.3 % (ref 0–10)
NEUTROPHILS ABSOLUTE: 5.2 K/UL (ref 1.5–7.5)
NEUTROPHILS RELATIVE PERCENT: 70.2 % (ref 50–65)
PDW BLD-RTO: 12 % (ref 11.5–14.5)
PLATELET # BLD: 224 K/UL (ref 130–400)
PMV BLD AUTO: 10.7 FL (ref 9.4–12.3)
POTASSIUM SERPL-SCNC: 4.2 MMOL/L (ref 3.5–5)
RBC # BLD: 4.49 M/UL (ref 4.2–5.4)
SODIUM BLD-SCNC: 141 MMOL/L (ref 136–145)
TOTAL PROTEIN: 7.2 G/DL (ref 6.6–8.7)
TRIGL SERPL-MCNC: 102 MG/DL (ref 0–149)
WBC # BLD: 7.4 K/UL (ref 4.8–10.8)

## 2021-08-30 ENCOUNTER — HOSPITAL ENCOUNTER (OUTPATIENT)
Dept: WOMENS IMAGING | Age: 81
Discharge: HOME | End: 2021-08-30
Payer: MEDICARE

## 2021-08-30 DIAGNOSIS — Z78.0 POSTMENOPAUSAL: ICD-10-CM

## 2021-08-30 PROCEDURE — 77080 DXA BONE DENSITY AXIAL: CPT

## 2021-09-01 ENCOUNTER — HOSPITAL ENCOUNTER (OUTPATIENT)
Dept: MAMMOGRAPHY | Facility: HOSPITAL | Age: 81
Discharge: HOME | End: 2021-09-01
Admitting: SPECIALIST

## 2021-09-01 DIAGNOSIS — Z12.31 OTHER SCREENING MAMMOGRAM: ICD-10-CM

## 2021-09-01 PROCEDURE — 77067 SCR MAMMO BI INCL CAD: CPT

## 2021-09-01 PROCEDURE — 77063 BREAST TOMOSYNTHESIS BI: CPT

## 2021-09-29 DIAGNOSIS — D05.11 DUCTAL CARCINOMA IN SITU (DCIS) OF RIGHT BREAST: Primary | ICD-10-CM

## 2021-10-14 NOTE — PROGRESS NOTES
[facility]  HEMATOLOGY & ONCOLOGY    [facility] HEMATOLOGY AND ONCOLOGY  [address]  [place] [address] [phone]    Patient Name: Suzanna Jolly  Encounter Date: 10/18/2021  YOB: 1940  Patient Number: [id number]    Chief Complaint   Patient presents with   • Ductal carcinoma in situ (DCIS) of right breast     MAMMOGRAM IN AUG.       REASON FOR VISIT: Ms. Jolly is a 82 yo patient here today in follow up for ductal carcinoma in situ of the right breast.  She was diagnosed in 2013 and underwent lumpectomy.  This was followed by radiation therapy and then tamoxifen for 5 years.  She has done well and has shown no sign of recurrence.     Labs today show a WBC of 7.29, Hgb 14.0 and Plt count of 215,000.  CMP is normal except a nonfasting glucose of 121.    She did have to had to have a  lump removed from the incision site at the breast cancer site in September 2020.  US confirmed the lesion and therefore Dr Siu discussed it with the patient and they decided to complete another lumpectomy.  Pathology on 9/14/20 shows no malignancy just fat necrosis.     Mammogram 9/1/2021 showed no sign of malignancy.    She is feeling well today and has no complaints.     PAST MEDICAL HISTORY:  ALLERGIES:  Allergies   Allergen Reactions   • Septra [Sulfamethoxazole-Trimethoprim] Hives and Nausea And Vomiting   • Ceftin [Cefuroxime] Hives and GI Intolerance   • Cefuroxime Axetil Hives and Nausea And Vomiting   • Ciprofloxacin Hives, Nausea And Vomiting and Rash   • Keflex [Cephalexin] Hives, Nausea And Vomiting and GI Intolerance   • Hydrocodone Rash     Rash and flushed         CURRENT MEDICATIONS:  Outpatient Encounter Medications as of 10/18/2021   Medication Sig Dispense Refill   • aspirin 81 MG EC tablet Take 81 mg by mouth Daily.     • B Complex-C (SUPER B COMPLEX PO) Take 1 tablet by mouth Daily.     • BREO ELLIPTA  100-25 MCG/INH aerosol powder  INHALE 1 PUFF BY MOUTH EVERY DAY IN THE MORNING **RINSE MOUTH AFTER USE**  6   • Calcium Carbonate (CALCIUM 500 PO) Take 500 mg by mouth Daily.     • Dexlansoprazole (DEXILANT PO) Take 60 mg by mouth Daily.     • IPRATROPIUM BROMIDE NA 2 sprays into the nostril(s) as directed by provider Daily.     • levocetirizine (XYZAL) 5 MG tablet Take 5 mg by mouth Daily.     • metFORMIN (GLUCOPHAGE) 500 MG tablet Take 500 mg by mouth Daily With Breakfast.     • montelukast (SINGULAIR) 10 MG tablet Take 10 mg by mouth Daily.     • Multiple Vitamins-Minerals (MULTIVITAMIN WITH MINERALS) tablet tablet Take 1 tablet by mouth Daily.     • rosuvastatin (CRESTOR) 10 MG tablet Take 10 mg by mouth Daily.     • triamterene-hydrochlorothiazide (DYAZIDE) 37.5-25 MG per capsule Take 1 capsule by mouth Daily.     • ursodiol (ACTIGALL) 250 MG tablet Take 250 mg by mouth Daily.  5   • vitamin D3 (Vitamin D) 125 MCG (5000 UT) capsule capsule Take 5,000 Units by mouth Daily.     • vitamin E 400 UNIT capsule Take 400 Units by mouth Daily.       No facility-administered encounter medications on file as of 10/18/2021.     ADULT ILLNESSES:  Patient Active Problem List   Diagnosis Code   • Sepsis (HCC) A41.9   • Acute colitis K52.9   • Nausea R11.0   • Abnormal liver function R94.5   • Ductal carcinoma in situ (DCIS) of right breast D05.11   • Hypertension I10   • Osteoporosis M81.0     SURGERIES:  Past Surgical History:   Procedure Laterality Date   • BREAST BIOPSY Right 11/11/2013    cancer   • BREAST BIOPSY Right 1972     with Benign findings   • BREAST BIOPSY Right 2018    benign   • BREAST BIOPSY Right 9/14/2020    Procedure: WIDE EXCISION RIGHT BREAST NODULE;  Surgeon: Melina Lan MD;  Location: [facility];  Service: General;  Laterality: Right;   • BREAST LUMPECTOMY Right 12/03/2013   • BREAST LUMPECTOMY      RIGHT   • CHOLECYSTECTOMY     • COLONOSCOPY  2010     Dr. Gardiner. facility used [facility]   • ERCP  AND ENDOSCOPY     • FOOT SURGERY  09/2014     Dr. Sherri Saha in [facility]   • MAMMO BILATERAL  08/19/2014   • PAP SMEAR     • SKIN BIOPSY Right 11/16/2013    FOOT AND LEG; BENIGN FINDINGS   • TUBAL ABDOMINAL LIGATION       HEALTH MAINTENANCE ITEMS:    Last Completed Colonoscopy     November 2011 - negative        Immunization History   Administered Date(s) Administered   • COVID-19 (MODERNA) 02/12/2021, 03/12/2021     Last Completed Mammogram       Status Date      MAMMOGRAM Done 9/1/2021 Mammogram 9/1/2021 showed no sign of malignancy.                 FAMILY HISTORY:  Family History   Problem Relation Age of Onset   • Stroke Brother    • No Known Problems Daughter    • No Known Problems Son    • Other Brother         hx of many comorbidities   • Down syndrome Brother    • Stroke Mother    • Pneumonia Father    • Stroke Father    • No Known Problems Sister    • No Known Problems Maternal Grandmother    • No Known Problems Paternal Grandmother    • No Known Problems Maternal Aunt    • No Known Problems Paternal Aunt    • Breast cancer Neg Hx    • BRCA 1/2 Neg Hx    • Colon cancer Neg Hx    • Endometrial cancer Neg Hx    • Ovarian cancer Neg Hx      SOCIAL HISTORY:  Social History     Socioeconomic History   • Marital status:    Tobacco Use   • Smoking status: Never Smoker   • Smokeless tobacco: Never Used   Substance and Sexual Activity   • Alcohol use: No   • Drug use: No   • Sexual activity: Defer       REVIEW OF SYSTEMS:  Review of Systems   Constitutional: Negative for activity change, appetite change, chills, diaphoresis, fatigue, fever and unexpected weight loss.   HENT: Negative for ear pain, nosebleeds, sinus pressure, sore throat and voice change.    Eyes: Negative for blurred vision, double vision, pain and visual disturbance.   Respiratory: Negative for cough and shortness of breath.    Cardiovascular: Negative for chest pain, palpitations and leg swelling.   Gastrointestinal: Negative for  "abdominal pain, anal bleeding, blood in stool, constipation, diarrhea, nausea and vomiting.   Endocrine: Negative for heat intolerance, polydipsia and polyuria.   Genitourinary: Negative for dysuria, frequency, hematuria, urgency and urinary incontinence.   Musculoskeletal: Positive for arthralgias. Negative for myalgias.   Skin: Negative for rash and skin lesions.   Neurological: Negative for dizziness, tremors, seizures, syncope, speech difficulty, weakness and headache.   Hematological: Negative for adenopathy. Does not bruise/bleed easily.   Psychiatric/Behavioral: Negative for dysphoric mood, sleep disturbance, suicidal ideas and depressed mood.       /88   Pulse 90   Temp 98.1 °F (36.7 °C)   Resp 16   Ht 160 cm (63\")   Wt 70.3 kg (155 lb)   LMP  (LMP Unknown)   SpO2 96%   Breastfeeding No   BMI 27.46 kg/m²  Body surface area is 1.74 meters squared.  Pain Score    10/18/21 1349   PainSc: 0-No pain       Physical Exam:  Physical Exam  Vitals reviewed.   Constitutional:       Appearance: Normal appearance. She is well-developed.   HENT:      Head: Atraumatic.   Eyes:      General: No scleral icterus.     Pupils: Pupils are equal, round, and reactive to light.   Neck:      Trachea: Trachea normal.   Cardiovascular:      Rate and Rhythm: Normal rate and regular rhythm.   Pulmonary:      Effort: Pulmonary effort is normal.      Breath sounds: Normal breath sounds. No wheezing, rhonchi or rales.   Chest:   Breasts:      Right: No supraclavicular adenopathy.      Left: Normal. No supraclavicular adenopathy.        Comments: Right breast with surgical change noted  Abdominal:      Palpations: Abdomen is soft.      Tenderness: There is no abdominal tenderness. There is no guarding or rebound.   Musculoskeletal:      Cervical back: Neck supple.   Lymphadenopathy:      Cervical: No cervical adenopathy.      Upper Body:      Right upper body: No supraclavicular adenopathy.      Left upper body: No " supraclavicular adenopathy.   Skin:     General: Skin is warm and dry.   Neurological:      General: No focal deficit present.      Mental Status: She is alert and oriented to person, place, and time.   Psychiatric:         Mood and Affect: Mood normal.         Behavior: Behavior normal.         Suzanna Jolly reports a pain score of 0.   Patient's Body mass index is 27.46 kg/m². BMI is above normal parameters. Recommendations include: defer to pcp.      LABS    Lab Results - Last 18 Months   Lab Units 10/18/21  1341 08/11/21  0921 10/14/20  1357 09/11/20  1010 08/21/20  0951   HEMOGLOBIN g/dL 14.0 14.8 13.9 14.0 14.9   HEMATOCRIT % 40.8 43.9 38.9 39.4 43.0   MCV fL 94.2 97.8 93.1 93.6 96.0   WBC 10*3/mm3 7.29 7.4 7.82 8.24 7.7   RDW % 11.9* 12.0 11.9* 11.9* 11.9   MPV fL 9.9 10.7 9.4 10.2 10.4   PLATELETS 10*3/mm3 215 224 239 212 214   IMM GRAN % % 0.4  --  0.3 0.4  --    NEUTROS ABS 10*3/mm3 5.25 5.2 6.13 6.61 5.7   LYMPHS ABS 10*3/mm3 1.19 1.4 1.03 0.93 1.1   MONOS ABS 10*3/mm3 0.59 0.50 0.43 0.49 0.60   EOS ABS 10*3/mm3 0.18 0.20 0.17 0.13 0.20   BASOS ABS 10*3/mm3 0.05 0.10 0.04 0.05 0.00   IMMATURE GRANS (ABS) 10*3/mm3 0.03 0.0 0.02 0.03 0.0   NRBC /100 WBC 0.0  --  0.0 0.0  --        Lab Results - Last 18 Months   Lab Units 08/11/21  0921 02/23/21  1005 01/26/21  1419 10/14/20  1357 09/11/20  1010 08/21/20  0951   GLUCOSE mg/dL 107 107  --  160* 140* 120*   SODIUM mmol/L 141 143  --  137 140 141   POTASSIUM mmol/L 4.2 4.1  --  4.2 4.0 4.1   TOTAL CO2 mmol/L 28 27  --   --   --  28   CO2 mmol/L  --   --   --  25.0 25.0  --    CHLORIDE mmol/L 103 103  --  102 105 102   ANION GAP mmol/L 10 13  --  10.0 10.0 11   CREATININE mg/dL 0.7 0.7  --  0.60 0.77 0.9   BUN mg/dL 14 15  --  12 16 20   BUN / CREAT RATIO   --   --   --  20.0 20.8  --    CALCIUM mg/dL 9.9 9.5  --  9.4 9.1 9.7   EGFR IF NONAFRICN AM  >60 >60  --  96 72 >60   ALK PHOS U/L 61  --  64 61 61  --    TOTAL PROTEIN g/dL 7.2  --  6.9 6.7 6.9  --     ALT (SGPT) U/L 14  --  16 14 14  --    AST (SGOT) U/L 22  --  22 23 21  --    BILIRUBIN mg/dL 0.6  --  0.3 0.3 0.3  --    ALBUMIN g/dL 4.6  --  4.2 4.30 4.40  --    GLOBULIN gm/dL  --   --   --  2.4 2.5  --        ASSESSMENT  1. Right breast ductal carcinoma in situ diagnosed in 2013  · Initial Stage: AJCC TNM [id number], stage 0  · Treatment: Lumpectomy followed by adjuvant radiation therapy.  She then took TMX for 5 years which was completed in August 2019.  · Disease status: ELIZABETH, mammogram UTD as of 9/1/2021 and normal.   2.  Osteoporosis, BMD in 2016.  Has been on Boniva, calcium + D.  Managed by PCP  3.  Leukocytosis now resolved, previous flow negative  4.  Type 2 DM stable on metformin  5.  Hematologically stable with normal cbc and cmp  6.  Hypertension controlled. BP at 124/88  7.  Right breast nodule at previous tumor bed site 9/2020, resected and negative. Path - fat necrosis    PLAN  1.  Counseled patient regarding lab work today and its normalcy  2.  Encouraged patient to continue yearly mammograms  3.  Encouraged patient to continue to follow with pcp and other specialists  4.  Return in 1 year for follow up. Preoffice cbc, cmp      I spent 22 minutes caring for Suzanna on this date of service. This time includes time spent by me in the following activities: preparing for the visit, reviewing tests, performing a medically appropriate examination and/or evaluation, counseling and educating the patient/family/caregiver, ordering medications, tests, or procedures and documenting information in the medical record.       Janae Malone, APRN  10/18/2021

## 2021-10-18 ENCOUNTER — LAB (OUTPATIENT)
Dept: LAB | Facility: HOSPITAL | Age: 81
End: 2021-10-18

## 2021-10-18 ENCOUNTER — OFFICE VISIT (OUTPATIENT)
Dept: ONCOLOGY | Facility: CLINIC | Age: 81
End: 2021-10-18

## 2021-10-18 VITALS
SYSTOLIC BLOOD PRESSURE: 124 MMHG | OXYGEN SATURATION: 96 % | WEIGHT: 155 LBS | HEIGHT: 63 IN | BODY MASS INDEX: 27.46 KG/M2 | RESPIRATION RATE: 16 BRPM | DIASTOLIC BLOOD PRESSURE: 88 MMHG | HEART RATE: 90 BPM | TEMPERATURE: 98.1 F

## 2021-10-18 DIAGNOSIS — D05.11 DUCTAL CARCINOMA IN SITU (DCIS) OF RIGHT BREAST: Primary | ICD-10-CM

## 2021-10-18 DIAGNOSIS — I10 PRIMARY HYPERTENSION: ICD-10-CM

## 2021-10-18 DIAGNOSIS — M81.0 OSTEOPOROSIS, UNSPECIFIED OSTEOPOROSIS TYPE, UNSPECIFIED PATHOLOGICAL FRACTURE PRESENCE: ICD-10-CM

## 2021-10-18 LAB
ALBUMIN SERPL-MCNC: 4.5 G/DL (ref 3.5–5.2)
ALBUMIN/GLOB SERPL: 2.1 G/DL
ALP SERPL-CCNC: 53 U/L (ref 39–117)
ALT SERPL W P-5'-P-CCNC: 16 U/L (ref 1–33)
ANION GAP SERPL CALCULATED.3IONS-SCNC: 8 MMOL/L (ref 5–15)
AST SERPL-CCNC: 22 U/L (ref 1–32)
BASOPHILS # BLD AUTO: 0.05 10*3/MM3 (ref 0–0.2)
BASOPHILS NFR BLD AUTO: 0.7 % (ref 0–1.5)
BILIRUB SERPL-MCNC: 0.3 MG/DL (ref 0–1.2)
BUN SERPL-MCNC: 13 MG/DL (ref 8–23)
BUN/CREAT SERPL: 16.9 (ref 7–25)
CALCIUM SPEC-SCNC: 9 MG/DL (ref 8.6–10.5)
CHLORIDE SERPL-SCNC: 105 MMOL/L (ref 98–107)
CO2 SERPL-SCNC: 27 MMOL/L (ref 22–29)
CREAT SERPL-MCNC: 0.77 MG/DL (ref 0.57–1)
DEPRECATED RDW RBC AUTO: 41.3 FL (ref 37–54)
EOSINOPHIL # BLD AUTO: 0.18 10*3/MM3 (ref 0–0.4)
EOSINOPHIL NFR BLD AUTO: 2.5 % (ref 0.3–6.2)
ERYTHROCYTE [DISTWIDTH] IN BLOOD BY AUTOMATED COUNT: 11.9 % (ref 12.3–15.4)
GFR SERPL CREATININE-BSD FRML MDRD: 72 ML/MIN/1.73
GLOBULIN UR ELPH-MCNC: 2.1 GM/DL
GLUCOSE SERPL-MCNC: 121 MG/DL (ref 65–99)
HCT VFR BLD AUTO: 40.8 % (ref 34–46.6)
HGB BLD-MCNC: 14 G/DL (ref 12–15.9)
HOLD SPECIMEN: NORMAL
IMM GRANULOCYTES # BLD AUTO: 0.03 10*3/MM3 (ref 0–0.05)
IMM GRANULOCYTES NFR BLD AUTO: 0.4 % (ref 0–0.5)
LYMPHOCYTES # BLD AUTO: 1.19 10*3/MM3 (ref 0.7–3.1)
LYMPHOCYTES NFR BLD AUTO: 16.3 % (ref 19.6–45.3)
MCH RBC QN AUTO: 32.3 PG (ref 26.6–33)
MCHC RBC AUTO-ENTMCNC: 34.3 G/DL (ref 31.5–35.7)
MCV RBC AUTO: 94.2 FL (ref 79–97)
MONOCYTES # BLD AUTO: 0.59 10*3/MM3 (ref 0.1–0.9)
MONOCYTES NFR BLD AUTO: 8.1 % (ref 5–12)
NEUTROPHILS NFR BLD AUTO: 5.25 10*3/MM3 (ref 1.7–7)
NEUTROPHILS NFR BLD AUTO: 72 % (ref 42.7–76)
NRBC BLD AUTO-RTO: 0 /100 WBC (ref 0–0.2)
PLATELET # BLD AUTO: 215 10*3/MM3 (ref 140–450)
PMV BLD AUTO: 9.9 FL (ref 6–12)
POTASSIUM SERPL-SCNC: 4.1 MMOL/L (ref 3.5–5.2)
PROT SERPL-MCNC: 6.6 G/DL (ref 6–8.5)
RBC # BLD AUTO: 4.33 10*6/MM3 (ref 3.77–5.28)
SODIUM SERPL-SCNC: 140 MMOL/L (ref 136–145)
WBC # BLD AUTO: 7.29 10*3/MM3 (ref 3.4–10.8)

## 2021-10-18 PROCEDURE — 36415 COLL VENOUS BLD VENIPUNCTURE: CPT

## 2021-10-18 PROCEDURE — 85025 COMPLETE CBC W/AUTO DIFF WBC: CPT

## 2021-10-18 PROCEDURE — 99213 OFFICE O/P EST LOW 20 MIN: CPT | Performed by: NURSE PRACTITIONER

## 2021-10-18 PROCEDURE — 80053 COMPREHEN METABOLIC PANEL: CPT

## 2021-12-14 ENCOUNTER — OFFICE VISIT (OUTPATIENT)
Dept: OBGYN CLINIC | Age: 81
End: 2021-12-14
Payer: MEDICARE

## 2021-12-14 VITALS
SYSTOLIC BLOOD PRESSURE: 147 MMHG | BODY MASS INDEX: 26.58 KG/M2 | DIASTOLIC BLOOD PRESSURE: 74 MMHG | HEIGHT: 63 IN | WEIGHT: 150 LBS | HEART RATE: 86 BPM

## 2021-12-14 DIAGNOSIS — N39.45 CONTINUOUS LEAKAGE OF URINE: ICD-10-CM

## 2021-12-14 DIAGNOSIS — Z85.3 HISTORY OF BREAST CANCER: ICD-10-CM

## 2021-12-14 DIAGNOSIS — Z12.4 SCREENING FOR CERVICAL CANCER: ICD-10-CM

## 2021-12-14 DIAGNOSIS — Z91.89 GYN EXAM FOR HIGH-RISK MEDICARE PATIENT: Primary | ICD-10-CM

## 2021-12-14 DIAGNOSIS — Z12.39 SCREENING BREAST EXAMINATION: ICD-10-CM

## 2021-12-14 DIAGNOSIS — Z78.0 POSTMENOPAUSAL: ICD-10-CM

## 2021-12-14 PROCEDURE — 4040F PNEUMOC VAC/ADMIN/RCVD: CPT | Performed by: NURSE PRACTITIONER

## 2021-12-14 PROCEDURE — G8417 CALC BMI ABV UP PARAM F/U: HCPCS | Performed by: NURSE PRACTITIONER

## 2021-12-14 PROCEDURE — G8484 FLU IMMUNIZE NO ADMIN: HCPCS | Performed by: NURSE PRACTITIONER

## 2021-12-14 PROCEDURE — 0509F URINE INCON PLAN DOCD: CPT | Performed by: NURSE PRACTITIONER

## 2021-12-14 PROCEDURE — G8399 PT W/DXA RESULTS DOCUMENT: HCPCS | Performed by: NURSE PRACTITIONER

## 2021-12-14 PROCEDURE — G8427 DOCREV CUR MEDS BY ELIG CLIN: HCPCS | Performed by: NURSE PRACTITIONER

## 2021-12-14 PROCEDURE — 1036F TOBACCO NON-USER: CPT | Performed by: NURSE PRACTITIONER

## 2021-12-14 PROCEDURE — 1090F PRES/ABSN URINE INCON ASSESS: CPT | Performed by: NURSE PRACTITIONER

## 2021-12-14 PROCEDURE — 1123F ACP DISCUSS/DSCN MKR DOCD: CPT | Performed by: NURSE PRACTITIONER

## 2021-12-14 PROCEDURE — G0101 CA SCREEN;PELVIC/BREAST EXAM: HCPCS | Performed by: NURSE PRACTITIONER

## 2021-12-14 RX ORDER — OXYBUTYNIN CHLORIDE 5 MG/1
5 TABLET, EXTENDED RELEASE ORAL DAILY
Qty: 30 TABLET | Refills: 3 | Status: SHIPPED | OUTPATIENT
Start: 2021-12-14 | End: 2022-03-09

## 2021-12-14 ASSESSMENT — ENCOUNTER SYMPTOMS
EYES NEGATIVE: 1
DIARRHEA: 0
RESPIRATORY NEGATIVE: 1
ALLERGIC/IMMUNOLOGIC NEGATIVE: 1
CONSTIPATION: 0
GASTROINTESTINAL NEGATIVE: 1

## 2021-12-14 NOTE — PROGRESS NOTES
10 MG tablet Take 10 mg by mouth daily      triamterene-hydrochlorothiazide (MAXZIDE-25) 37.5-25 MG per tablet       QVAR 80 MCG/ACT inhaler       tamoxifen (NOLVADEX) 10 MG tablet       montelukast (SINGULAIR) 10 MG tablet Take 10 mg by mouth nightly      levocetirizine (XYZAL) 5 MG tablet       metFORMIN (GLUCOPHAGE) 500 MG tablet Take 500 mg by mouth daily (with breakfast)       ONETOUCH VERIO strip        No current facility-administered medications for this visit. Allergies   Allergen Reactions    Ceftin [Cefuroxime Axetil] Hives and Nausea And Vomiting    Ciprofloxacin Hives and Nausea And Vomiting    Keftab [Cephalexin] Hives and Nausea And Vomiting    Septra [Sulfamethoxazole-Trimethoprim] Hives and Nausea And Vomiting     Vitals:    12/14/21 1408   BP: (!) 147/74   Pulse: 86     Body mass index is 26.57 kg/m². Review of Systems   Constitutional: Negative. HENT: Negative. Eyes: Negative. Respiratory: Negative. Cardiovascular: Negative. Gastrointestinal: Negative. Negative for constipation and diarrhea. Endocrine: Negative. Genitourinary: Positive for enuresis. Negative for frequency, menstrual problem and urgency. Musculoskeletal: Negative. Skin: Negative. Allergic/Immunologic: Negative. Neurological: Negative. Hematological: Negative. Psychiatric/Behavioral: Negative. All other systems reviewed and are negative. Physical Exam  Vitals and nursing note reviewed. Constitutional:       Appearance: She is well-developed. HENT:      Head: Normocephalic. Neck:      Thyroid: No thyroid mass or thyromegaly. Cardiovascular:      Rate and Rhythm: Normal rate and regular rhythm. Pulmonary:      Effort: Pulmonary effort is normal.      Breath sounds: Normal breath sounds. Chest:   Breasts:      Right: Skin change present. No inverted nipple, mass or nipple discharge. Left: No inverted nipple, mass, nipple discharge or skin change. Comments: Scarring associated with previous lumpectomy, firm tissue approx 5cm, no change from previous exam  Abdominal:      Palpations: Abdomen is soft. There is no mass. Tenderness: There is no abdominal tenderness. Genitourinary:     General: Normal vulva. Vagina: Normal.      Cervix: No cervical motion tenderness. Uterus: Normal. Not enlarged. Adnexa:         Right: No mass or tenderness. Left: No mass or tenderness. Comments: Atrophic vaginal changes  External os pinpoint  Musculoskeletal:         General: Normal range of motion. Cervical back: Normal range of motion and neck supple. Skin:     General: Skin is warm and dry. Neurological:      Mental Status: She is alert and oriented to person, place, and time. Psychiatric:         Attention and Perception: Attention normal.         Mood and Affect: Mood normal.         Speech: Speech normal.         Behavior: Behavior normal.         Thought Content: Thought content normal.         Cognition and Memory: Cognition normal.         Judgment: Judgment normal.          Diagnosis Orders   1. GYN exam for high-risk Medicare patient  RI CA SCREEN;PELVIC/BREAST EXAM   2. Screening breast examination  RI CA SCREEN;PELVIC/BREAST EXAM   3. Screening for cervical cancer  RI CA SCREEN;PELVIC/BREAST EXAM    PAP SMEAR    Human papillomavirus (HPV) DNA probe thin prep high risk   4. History of breast cancer     5. Postmenopausal     6.  Continuous leakage of urine         MEDICATIONS:  Orders Placed This Encounter   Medications    oxybutynin (DITROPAN XL) 5 MG extended release tablet     Sig: Take 1 tablet by mouth daily     Dispense:  30 tablet     Refill:  3       ORDERS:  Orders Placed This Encounter   Procedures    PAP SMEAR    Human papillomavirus (HPV) DNA probe thin prep high risk    RI CA SCREEN;PELVIC/BREAST EXAM       PLAN:  Pap collected  Discussed starting Ditropan for continuous leakage    Patient connected with your family and community. If you find you often feel sad or hopeless, talk with your doctor. Treatment can help. · Talk to your doctor about whether you have any risk factors for sexually transmitted infections (STIs). You can help prevent STIs if you wait to have sex with a new partner (or partners) until you've each been tested for STIs. It also helps if you use condoms (male or female condoms) and if you limit your sex partners to one person who only has sex with you. Vaccines are available for some STIs. · If you think you may have a problem with alcohol or drug use, talk to your doctor. This includes prescription medicines (such as amphetamines and opioids) and illegal drugs (such as cocaine and methamphetamine). Your doctor can help you figure out what type of treatment is best for you. · Protect your skin from too much sun. When you're outdoors from 10 a.m. to 4 p.m., stay in the shade or cover up with clothing and a hat with a wide brim. Wear sunglasses that block UV rays. Even when it's cloudy, put broad-spectrum sunscreen (SPF 30 or higher) on any exposed skin. · See a dentist one or two times a year for checkups and to have your teeth cleaned. · Wear a seat belt in the car. When should you call for help? Watch closely for changes in your health, and be sure to contact your doctor if you have any problems or symptoms that concern you. Where can you learn more? Go to [email or web address] [email or web address] org and sign in to your [employer] account. Enter K510 in the [facility] box to learn more about \"Well Visit, Over 65: Care Instructions. \"     If you do not have an account, please click on the \"Sign Up Now\" link. Current as of: February 11, 2021               Content Version: 13.0  © 9731-0926 Healthwise, Incorporated. Care instructions adapted under license by [facility] ([facility]).  If you have questions about a medical condition or this instruction, always ask your healthcare professional. [address] any warranty or liability for your use of this information.

## 2021-12-14 NOTE — PROGRESS NOTES
Pt presents today for pap smear and breast exam.      Mammo:  Pap smear:  Contraception:Postmenopausal     P:2  Ab:0  Bone density:  Colonoscopy:

## 2021-12-14 NOTE — PATIENT INSTRUCTIONS
Patient Education        Well Visit, Over 72: Care Instructions  Overview     Well visits can help you stay healthy. Your doctor has checked your overall health and may have suggested ways to take good care of yourself. Your doctor also may have recommended tests. At home, you can help prevent illness with healthy eating, regular exercise, and other steps. Follow-up care is a key part of your treatment and safety. Be sure to make and go to all appointments, and call your doctor if you are having problems. It's also a good idea to know your test results and keep a list of the medicines you take. How can you care for yourself at home? · Get screening tests that you and your doctor decide on. Screening helps find diseases before any symptoms appear. · Eat healthy foods. Choose fruits, vegetables, whole grains, protein, and low-fat dairy foods. Limit fat, especially saturated fat. Reduce salt in your diet. · Limit alcohol. If you are a man, have no more than 2 drinks a day or 14 drinks a week. If you are a woman, have no more than 1 drink a day or 7 drinks a week. Since alcohol affects older adults differently, you may want to limit alcohol even more. Or you may not want to drink at all. · Get at least 30 minutes of exercise on most days of the week. Walking is a good choice. You also may want to do other activities, such as running, swimming, cycling, or playing tennis or team sports. · Reach and stay at a healthy weight. This will lower your risk for many problems, such as obesity, diabetes, heart disease, and high blood pressure. · Do not smoke. Smoking can make health problems worse. If you need help quitting, talk to your doctor about stop-smoking programs and medicines. These can increase your chances of quitting for good. · Care for your mental health. It is easy to get weighed down by worry and stress.  Learn strategies to manage stress, like deep breathing and mindfulness, and stay connected with your family and community. If you find you often feel sad or hopeless, talk with your doctor. Treatment can help. · Talk to your doctor about whether you have any risk factors for sexually transmitted infections (STIs). You can help prevent STIs if you wait to have sex with a new partner (or partners) until you've each been tested for STIs. It also helps if you use condoms (male or female condoms) and if you limit your sex partners to one person who only has sex with you. Vaccines are available for some STIs. · If you think you may have a problem with alcohol or drug use, talk to your doctor. This includes prescription medicines (such as amphetamines and opioids) and illegal drugs (such as cocaine and methamphetamine). Your doctor can help you figure out what type of treatment is best for you. · Protect your skin from too much sun. When you're outdoors from 10 a.m. to 4 p.m., stay in the shade or cover up with clothing and a hat with a wide brim. Wear sunglasses that block UV rays. Even when it's cloudy, put broad-spectrum sunscreen (SPF 30 or higher) on any exposed skin. · See a dentist one or two times a year for checkups and to have your teeth cleaned. · Wear a seat belt in the car. When should you call for help? Watch closely for changes in your health, and be sure to contact your doctor if you have any problems or symptoms that concern you. Where can you learn more? Go to [email or web address] and sign in to your [email or web address] account. Enter F708 in the [facility] box to learn more about \"Well Visit, Over 65: Care Instructions. \"     If you do not have an account, please click on the \"Sign Up Now\" link. Current as of: February 11, 2021               Content Version: 13.0  © 3817-1721 Healthwise, Incorporated. Care instructions adapted under license by [facility] ([facility]).  If you have questions about a medical condition or this instruction, always ask your healthcare professional. [employer] disclaims any warranty or liability for your use of this information.

## 2022-01-25 ENCOUNTER — TELEPHONE (OUTPATIENT)
Dept: ONCOLOGY | Facility: CLINIC | Age: 82
End: 2022-01-25

## 2022-02-11 LAB
ALBUMIN SERPL-MCNC: 4.2 G/DL (ref 3.5–5.2)
ALP BLD-CCNC: 58 U/L (ref 35–104)
ALT SERPL-CCNC: 15 U/L (ref 5–33)
ANION GAP SERPL CALCULATED.3IONS-SCNC: 11 MMOL/L (ref 7–19)
AST SERPL-CCNC: 21 U/L (ref 5–32)
BASOPHILS ABSOLUTE: 0.1 K/UL (ref 0–0.2)
BASOPHILS RELATIVE PERCENT: 0.7 % (ref 0–1)
BILIRUB SERPL-MCNC: 0.4 MG/DL (ref 0.2–1.2)
BUN BLDV-MCNC: 18 MG/DL (ref 8–23)
CALCIUM SERPL-MCNC: 9.4 MG/DL (ref 8.8–10.2)
CHLORIDE BLD-SCNC: 103 MMOL/L (ref 98–111)
CHOLESTEROL, TOTAL: 159 MG/DL (ref 160–199)
CO2: 27 MMOL/L (ref 22–29)
CREAT SERPL-MCNC: 0.8 MG/DL (ref 0.5–0.9)
EOSINOPHILS ABSOLUTE: 0.2 K/UL (ref 0–0.6)
EOSINOPHILS RELATIVE PERCENT: 2.3 % (ref 0–5)
GFR AFRICAN AMERICAN: >59
GFR NON-AFRICAN AMERICAN: >60
GLUCOSE BLD-MCNC: 126 MG/DL (ref 74–109)
HBA1C MFR BLD: 6 % (ref 4–6)
HCT VFR BLD CALC: 45.4 % (ref 37–47)
HDLC SERPL-MCNC: 79 MG/DL (ref 65–121)
HEMOGLOBIN: 15 G/DL (ref 12–16)
IMMATURE GRANULOCYTES #: 0.1 K/UL
LDL CHOLESTEROL CALCULATED: 56 MG/DL
LYMPHOCYTES ABSOLUTE: 1.5 K/UL (ref 1.1–4.5)
LYMPHOCYTES RELATIVE PERCENT: 17.2 % (ref 20–40)
MCH RBC QN AUTO: 32.4 PG (ref 27–31)
MCHC RBC AUTO-ENTMCNC: 33 G/DL (ref 33–37)
MCV RBC AUTO: 98.1 FL (ref 81–99)
MONOCYTES ABSOLUTE: 0.6 K/UL (ref 0–0.9)
MONOCYTES RELATIVE PERCENT: 6.6 % (ref 0–10)
NEUTROPHILS ABSOLUTE: 6.4 K/UL (ref 1.5–7.5)
NEUTROPHILS RELATIVE PERCENT: 72.6 % (ref 50–65)
PDW BLD-RTO: 11.8 % (ref 11.5–14.5)
PLATELET # BLD: 217 K/UL (ref 130–400)
PMV BLD AUTO: 9.9 FL (ref 9.4–12.3)
POTASSIUM SERPL-SCNC: 4.3 MMOL/L (ref 3.5–5)
RBC # BLD: 4.63 M/UL (ref 4.2–5.4)
SODIUM BLD-SCNC: 141 MMOL/L (ref 136–145)
TOTAL PROTEIN: 6.8 G/DL (ref 6.6–8.7)
TRIGL SERPL-MCNC: 118 MG/DL (ref 0–149)
WBC # BLD: 8.8 K/UL (ref 4.8–10.8)

## 2022-03-09 RX ORDER — OXYBUTYNIN CHLORIDE 5 MG/1
TABLET, EXTENDED RELEASE ORAL
Qty: 90 TABLET | Refills: 1 | Status: SHIPPED | OUTPATIENT
Start: 2022-03-09 | End: 2022-09-08

## 2022-03-14 ENCOUNTER — TRANSCRIBE ORDERS (OUTPATIENT)
Dept: ADMINISTRATIVE | Facility: HOSPITAL | Age: 82
End: 2022-03-14

## 2022-03-14 DIAGNOSIS — Z85.3 PERSONAL HISTORY OF MALIGNANT NEOPLASM OF BREAST: Primary | ICD-10-CM

## 2022-03-29 NOTE — PLAN OF CARE
Problem: Patient Care Overview (Adult)  Goal: Plan of Care Review  Outcome: Ongoing (interventions implemented as appropriate)    03/07/17 0454   Coping/Psychosocial Response Interventions   Plan Of Care Reviewed With patient   Patient Care Overview   Progress improving   Outcome Evaluation   Outcome Summary/Follow up Plan still no bm for stool sample, lab work this am, PO abx cont. cont. to monitor         Problem: Pneumonia (Adult)  Goal: Signs and Symptoms of Listed Potential Problems Will be Absent or Manageable (Pneumonia)  Outcome: Ongoing (interventions implemented as appropriate)    Problem: Sepsis (Adult)  Goal: Signs and Symptoms of Listed Potential Problems Will be Absent or Manageable (Sepsis)  Outcome: Ongoing (interventions implemented as appropriate)       Clear

## 2022-07-05 ENCOUNTER — TELEPHONE (OUTPATIENT)
Dept: URGENT CARE | Facility: CLINIC | Age: 82
End: 2022-07-05

## 2022-07-05 DIAGNOSIS — J01.90 ACUTE RHINOSINUSITIS: Primary | ICD-10-CM

## 2022-07-05 PROCEDURE — 87635 SARS-COV-2 COVID-19 AMP PRB: CPT | Performed by: NURSE PRACTITIONER

## 2022-07-05 RX ORDER — METHYLPREDNISOLONE 4 MG/1
TABLET ORAL
Qty: 21 TABLET | Refills: 0 | Status: SHIPPED | OUTPATIENT
Start: 2022-07-05 | End: 2022-10-18

## 2022-08-11 ENCOUNTER — HOSPITAL ENCOUNTER (OUTPATIENT)
Dept: GENERAL RADIOLOGY | Age: 82
Discharge: HOME | End: 2022-08-11
Payer: MEDICARE

## 2022-08-11 DIAGNOSIS — R05.9 COUGH: ICD-10-CM

## 2022-08-11 LAB
ALBUMIN SERPL-MCNC: 4.2 G/DL (ref 3.5–5.2)
ALP BLD-CCNC: 50 U/L (ref 35–104)
ALT SERPL-CCNC: 14 U/L (ref 5–33)
ANION GAP SERPL CALCULATED.3IONS-SCNC: 7 MMOL/L (ref 7–19)
AST SERPL-CCNC: 22 U/L (ref 5–32)
BASOPHILS ABSOLUTE: 0.1 K/UL (ref 0–0.2)
BASOPHILS RELATIVE PERCENT: 0.7 % (ref 0–1)
BILIRUB SERPL-MCNC: 0.5 MG/DL (ref 0.2–1.2)
BUN BLDV-MCNC: 14 MG/DL (ref 8–23)
CALCIUM SERPL-MCNC: 9.2 MG/DL (ref 8.8–10.2)
CHLORIDE BLD-SCNC: 101 MMOL/L (ref 98–111)
CHOLESTEROL, TOTAL: 136 MG/DL (ref 160–199)
CO2: 29 MMOL/L (ref 22–29)
CREAT SERPL-MCNC: 0.7 MG/DL (ref 0.5–0.9)
EOSINOPHILS ABSOLUTE: 0.2 K/UL (ref 0–0.6)
EOSINOPHILS RELATIVE PERCENT: 2.4 % (ref 0–5)
GFR AFRICAN AMERICAN: >59
GFR NON-AFRICAN AMERICAN: >60
GLUCOSE BLD-MCNC: 102 MG/DL (ref 74–109)
HBA1C MFR BLD: 6 % (ref 4–6)
HCT VFR BLD CALC: 43.1 % (ref 37–47)
HDLC SERPL-MCNC: 67 MG/DL (ref 65–121)
HEMOGLOBIN: 14.5 G/DL (ref 12–16)
IMMATURE GRANULOCYTES #: 0 K/UL
LDL CHOLESTEROL CALCULATED: 42 MG/DL
LYMPHOCYTES ABSOLUTE: 1.5 K/UL (ref 1.1–4.5)
LYMPHOCYTES RELATIVE PERCENT: 18.1 % (ref 20–40)
MCH RBC QN AUTO: 32.4 PG (ref 27–31)
MCHC RBC AUTO-ENTMCNC: 33.6 G/DL (ref 33–37)
MCV RBC AUTO: 96.4 FL (ref 81–99)
MONOCYTES ABSOLUTE: 0.7 K/UL (ref 0–0.9)
MONOCYTES RELATIVE PERCENT: 7.7 % (ref 0–10)
NEUTROPHILS ABSOLUTE: 6 K/UL (ref 1.5–7.5)
NEUTROPHILS RELATIVE PERCENT: 70.7 % (ref 50–65)
PDW BLD-RTO: 11.9 % (ref 11.5–14.5)
PLATELET # BLD: 218 K/UL (ref 130–400)
PMV BLD AUTO: 9.5 FL (ref 9.4–12.3)
POTASSIUM SERPL-SCNC: 4 MMOL/L (ref 3.5–5)
RBC # BLD: 4.47 M/UL (ref 4.2–5.4)
SODIUM BLD-SCNC: 137 MMOL/L (ref 136–145)
TOTAL PROTEIN: 6.6 G/DL (ref 6.6–8.7)
TRIGL SERPL-MCNC: 137 MG/DL (ref 0–149)
TSH SERPL DL<=0.05 MIU/L-ACNC: 5.2 UIU/ML (ref 0.27–4.2)
WBC # BLD: 8.5 K/UL (ref 4.8–10.8)

## 2022-08-11 PROCEDURE — 71046 X-RAY EXAM CHEST 2 VIEWS: CPT | Performed by: RADIOLOGY

## 2022-08-11 PROCEDURE — 71046 X-RAY EXAM CHEST 2 VIEWS: CPT

## 2022-09-08 RX ORDER — OXYBUTYNIN CHLORIDE 5 MG/1
TABLET, EXTENDED RELEASE ORAL
Qty: 90 TABLET | Refills: 1 | Status: SHIPPED | OUTPATIENT
Start: 2022-09-08

## 2022-09-23 ENCOUNTER — HOSPITAL ENCOUNTER (OUTPATIENT)
Dept: MAMMOGRAPHY | Facility: HOSPITAL | Age: 82
Discharge: HOME | End: 2022-09-23
Admitting: SPECIALIST

## 2022-09-23 PROCEDURE — 77067 SCR MAMMO BI INCL CAD: CPT

## 2022-09-23 PROCEDURE — 77063 BREAST TOMOSYNTHESIS BI: CPT

## 2022-09-29 ENCOUNTER — OFFICE VISIT (OUTPATIENT)
Dept: SURGERY | Facility: CLINIC | Age: 82
End: 2022-09-29

## 2022-09-29 VITALS
HEIGHT: 62 IN | WEIGHT: 153 LBS | BODY MASS INDEX: 28.16 KG/M2 | SYSTOLIC BLOOD PRESSURE: 137 MMHG | HEART RATE: 88 BPM | DIASTOLIC BLOOD PRESSURE: 72 MMHG | TEMPERATURE: 98.2 F | OXYGEN SATURATION: 98 %

## 2022-09-29 DIAGNOSIS — Z85.3 PERSONAL HISTORY OF MALIGNANT NEOPLASM OF BREAST: ICD-10-CM

## 2022-09-29 DIAGNOSIS — D05.11 DUCTAL CARCINOMA IN SITU (DCIS) OF RIGHT BREAST: Primary | ICD-10-CM

## 2022-09-29 PROCEDURE — 99213 OFFICE O/P EST LOW 20 MIN: CPT | Performed by: SPECIALIST

## 2022-09-29 NOTE — PROGRESS NOTES
Patient: Suzanna Jolly    YOB: 1940    Date: 09/29/2022    Primary Care Provider: Lorenzo Vogel MD    Chief Complaint   Patient presents with   • Yearly Breast Exam        Subjective .     History of present illness:   She is here for yearly follow-up    The following portions of the patient's history were reviewed and updated as appropriate: allergies, current medications, past family history, past medical history, past social history, past surgical history and problem list.    History:  Past Medical History:   Diagnosis Date   • Anemia, unspecified    • Arthritis    • Biliary stones    • Bone/cartilage disorder    • Bronchitis    • Chronic obstructive asthma with status asthmaticus (HCC)    • History of bone density study     DR. VOGEL   • Hx of radiation therapy 2013    right breast   • Hypertension    • Malignant neoplasm of upper-outer quadrant of right female breast (HCC) 2013    RIGHT SIDED BREAST CANCER WITH LUMPECTOMY   • Osteoporosis    • PONV (postoperative nausea and vomiting)    • Pure hypercholesterolemia    • Type 2 diabetes mellitus without complications (HCC)    • Urinary incontinence           Past Surgical History:   Procedure Laterality Date   • BREAST BIOPSY Right 11/11/2013    cancer   • BREAST BIOPSY Right 1972     with Benign findings   • BREAST BIOPSY Right 2018    benign   • BREAST BIOPSY Right 9/14/2020    Procedure: WIDE EXCISION RIGHT BREAST NODULE;  Surgeon: Melina Lan MD;  Location: [facility];  Service: General;  Laterality: Right;   • BREAST LUMPECTOMY Right 12/03/2013   • BREAST LUMPECTOMY      RIGHT   • CHOLECYSTECTOMY     • COLONOSCOPY  2010     Dr. Gardiner. facility used [facility]   • ERCP AND ENDOSCOPY     • FOOT SURGERY  09/2014     Dr. Sherri Saha in [facility]   • MAMMO BILATERAL  08/19/2014   • PAP SMEAR     • SKIN BIOPSY Right 11/16/2013    FOOT AND LEG; BENIGN FINDINGS   • TUBAL ABDOMINAL LIGATION         Family History   Problem Relation  Age of Onset   • Stroke Brother    • No Known Problems Daughter    • No Known Problems Son    • Other Brother         hx of many comorbidities   • Down syndrome Brother    • Stroke Mother    • Pneumonia Father    • Stroke Father    • No Known Problems Sister    • No Known Problems Maternal Grandmother    • No Known Problems Paternal Grandmother    • No Known Problems Maternal Aunt    • No Known Problems Paternal Aunt    • Breast cancer Neg Hx    • BRCA 1/2 Neg Hx    • Colon cancer Neg Hx    • Endometrial cancer Neg Hx    • Ovarian cancer Neg Hx        Social History     Tobacco Use   • Smoking status: Never Smoker   • Smokeless tobacco: Never Used   Substance Use Topics   • Alcohol use: No   • Drug use: No       Allergies:  Allergies   Allergen Reactions   • Septra [Sulfamethoxazole-Trimethoprim] Hives and Nausea And Vomiting   • Ceftin [Cefuroxime] Hives and GI Intolerance   • Cefuroxime Axetil Hives and Nausea And Vomiting   • Ciprofloxacin Hives, Nausea And Vomiting and Rash   • Keflex [Cephalexin] Hives, Nausea And Vomiting and GI Intolerance   • Hydrocodone Rash     Rash and flushed         Medications:     Current Outpatient Medications:   •  aspirin 81 MG EC tablet, Take 81 mg by mouth Daily., Disp: , Rfl:   •  B Complex-C (SUPER B COMPLEX PO), Take 1 tablet by mouth Daily., Disp: , Rfl:   •  benzonatate (TESSALON) 100 MG capsule, Take 100 mg by mouth 3 (Three) Times a Day As Needed., Disp: , Rfl:   •  Calcium Carbonate (CALCIUM 500 PO), Take 500 mg by mouth Daily., Disp: , Rfl:   •  famotidine (PEPCID) 20 MG tablet, Take 20 mg by mouth At Night As Needed., Disp: , Rfl:   •  IPRATROPIUM BROMIDE NA, 2 sprays into the nostril(s) as directed by provider Daily., Disp: , Rfl:   •  levocetirizine (XYZAL) 5 MG tablet, Take 5 mg by mouth Daily., Disp: , Rfl:   •  metFORMIN (GLUCOPHAGE) 500 MG tablet, Take 500 mg by mouth Daily With Breakfast., Disp: , Rfl:   •  methylPREDNISolone (MEDROL) 4 MG dose pack, Take as  "directed on package instructions., Disp: 21 tablet, Rfl: 0  •  montelukast (SINGULAIR) 10 MG tablet, Take 10 mg by mouth Daily., Disp: , Rfl:   •  Multiple Vitamins-Minerals (MULTIVITAMIN WITH MINERALS) tablet tablet, Take 1 tablet by mouth Daily., Disp: , Rfl:   •  oxybutynin XL (DITROPAN-XL) 5 MG 24 hr tablet, Take 5 mg by mouth Daily., Disp: , Rfl:   •  rosuvastatin (CRESTOR) 10 MG tablet, Take 10 mg by mouth Daily., Disp: , Rfl:   •  Symbicort 160-4.5 MCG/ACT inhaler, , Disp: , Rfl:   •  triamterene-hydrochlorothiazide (DYAZIDE) 37.5-25 MG per capsule, Take 1 capsule by mouth Daily., Disp: , Rfl:   •  ursodiol (ACTIGALL) 250 MG tablet, Take 250 mg by mouth Daily., Disp: , Rfl: 5  •  vitamin D3 125 MCG (5000 UT) capsule capsule, Take 5,000 Units by mouth Daily., Disp: , Rfl:   •  vitamin E 400 UNIT capsule, Take 400 Units by mouth Daily., Disp: , Rfl:     Vital Signs:   Vitals:    09/29/22 1037   BP: 137/72   Pulse: 88   Temp: 98.2 °F (36.8 °C)   SpO2: 98%   Weight: 69.4 kg (153 lb)   Height: 157.5 cm (62\")       No changes in her health.  On exam, she has the scarring in her right upper outer breast that is improved from initially but still problematic.  The last surgery did help some cosmetically but still there is a fair amount of crease in that area and dense scarring underneath.  I do not appreciate any masses in the breast.  Her axilla and supraclavicular regions are clear bilaterally.  Left breast has very minimal fibrocystic changes and some density to her breast tissue but there is no dominant masses skin changes.  There is no nipple retraction or discharge.  I reviewed her mammogram and she is fine other than the scarring from her lumpectomy site.  We will see her in 6 months but she will not need a repeat mammogram..    Results Review:   I reviewed the patient's new clinical results.        Assessment / Plan:    There are no diagnoses linked to this encounter.    BMI is >= 25 and <30. (Overweight) The " following options were offered after discussion;: exercise counseling/recommendations and nutrition counseling/recommendations      Electronically signed by Melina Lan MD  09/29/22  11:02 CDT

## 2022-10-10 ENCOUNTER — APPOINTMENT (OUTPATIENT)
Dept: LAB | Facility: HOSPITAL | Age: 82
End: 2022-10-10

## 2022-10-17 DIAGNOSIS — D05.11 DUCTAL CARCINOMA IN SITU (DCIS) OF RIGHT BREAST: Primary | ICD-10-CM

## 2022-10-18 ENCOUNTER — OFFICE VISIT (OUTPATIENT)
Dept: ONCOLOGY | Facility: CLINIC | Age: 82
End: 2022-10-18

## 2022-10-18 ENCOUNTER — LAB (OUTPATIENT)
Dept: LAB | Facility: HOSPITAL | Age: 82
End: 2022-10-18

## 2022-10-18 VITALS
RESPIRATION RATE: 16 BRPM | BODY MASS INDEX: 29.26 KG/M2 | TEMPERATURE: 98.1 F | SYSTOLIC BLOOD PRESSURE: 126 MMHG | HEIGHT: 62 IN | DIASTOLIC BLOOD PRESSURE: 82 MMHG | WEIGHT: 159 LBS | HEART RATE: 90 BPM | OXYGEN SATURATION: 98 %

## 2022-10-18 DIAGNOSIS — Z85.3 HISTORY OF BREAST CANCER: Primary | ICD-10-CM

## 2022-10-18 DIAGNOSIS — M85.80 OSTEOPENIA, UNSPECIFIED LOCATION: ICD-10-CM

## 2022-10-18 DIAGNOSIS — D05.11 DUCTAL CARCINOMA IN SITU (DCIS) OF RIGHT BREAST: Primary | ICD-10-CM

## 2022-10-18 LAB
ALBUMIN SERPL-MCNC: 4.1 G/DL (ref 3.5–5.2)
ALBUMIN/GLOB SERPL: 1.5 G/DL
ALP SERPL-CCNC: 51 U/L (ref 39–117)
ALT SERPL W P-5'-P-CCNC: 23 U/L (ref 1–33)
ANION GAP SERPL CALCULATED.3IONS-SCNC: 10 MMOL/L (ref 5–15)
AST SERPL-CCNC: 27 U/L (ref 1–32)
BASOPHILS # BLD AUTO: 0.05 10*3/MM3 (ref 0–0.2)
BASOPHILS NFR BLD AUTO: 0.8 % (ref 0–1.5)
BILIRUB SERPL-MCNC: 0.4 MG/DL (ref 0–1.2)
BUN SERPL-MCNC: 17 MG/DL (ref 8–23)
BUN/CREAT SERPL: 22.4 (ref 7–25)
CALCIUM SPEC-SCNC: 9.5 MG/DL (ref 8.6–10.5)
CHLORIDE SERPL-SCNC: 100 MMOL/L (ref 98–107)
CO2 SERPL-SCNC: 28 MMOL/L (ref 22–29)
CREAT SERPL-MCNC: 0.76 MG/DL (ref 0.57–1)
DEPRECATED RDW RBC AUTO: 41.5 FL (ref 37–54)
EGFRCR SERPLBLD CKD-EPI 2021: 77.9 ML/MIN/1.73
EOSINOPHIL # BLD AUTO: 0.21 10*3/MM3 (ref 0–0.4)
EOSINOPHIL NFR BLD AUTO: 3.6 % (ref 0.3–6.2)
ERYTHROCYTE [DISTWIDTH] IN BLOOD BY AUTOMATED COUNT: 11.7 % (ref 12.3–15.4)
GLOBULIN UR ELPH-MCNC: 2.7 GM/DL
GLUCOSE SERPL-MCNC: 127 MG/DL (ref 65–99)
HCT VFR BLD AUTO: 40.8 % (ref 34–46.6)
HGB BLD-MCNC: 14.2 G/DL (ref 12–15.9)
HOLD SPECIMEN: NORMAL
IMM GRANULOCYTES # BLD AUTO: 0.02 10*3/MM3 (ref 0–0.05)
IMM GRANULOCYTES NFR BLD AUTO: 0.3 % (ref 0–0.5)
LYMPHOCYTES # BLD AUTO: 0.99 10*3/MM3 (ref 0.7–3.1)
LYMPHOCYTES NFR BLD AUTO: 16.8 % (ref 19.6–45.3)
MCH RBC QN AUTO: 33.6 PG (ref 26.6–33)
MCHC RBC AUTO-ENTMCNC: 34.8 G/DL (ref 31.5–35.7)
MCV RBC AUTO: 96.7 FL (ref 79–97)
MONOCYTES # BLD AUTO: 0.45 10*3/MM3 (ref 0.1–0.9)
MONOCYTES NFR BLD AUTO: 7.6 % (ref 5–12)
NEUTROPHILS NFR BLD AUTO: 4.18 10*3/MM3 (ref 1.7–7)
NEUTROPHILS NFR BLD AUTO: 70.9 % (ref 42.7–76)
NRBC BLD AUTO-RTO: 0 /100 WBC (ref 0–0.2)
PLATELET # BLD AUTO: 186 10*3/MM3 (ref 140–450)
PMV BLD AUTO: 9.6 FL (ref 6–12)
POTASSIUM SERPL-SCNC: 4.2 MMOL/L (ref 3.5–5.2)
PROT SERPL-MCNC: 6.8 G/DL (ref 6–8.5)
RBC # BLD AUTO: 4.22 10*6/MM3 (ref 3.77–5.28)
SODIUM SERPL-SCNC: 138 MMOL/L (ref 136–145)
WBC NRBC COR # BLD: 5.9 10*3/MM3 (ref 3.4–10.8)

## 2022-10-18 PROCEDURE — 80053 COMPREHEN METABOLIC PANEL: CPT

## 2022-10-18 PROCEDURE — 99214 OFFICE O/P EST MOD 30 MIN: CPT | Performed by: NURSE PRACTITIONER

## 2022-10-18 PROCEDURE — 36415 COLL VENOUS BLD VENIPUNCTURE: CPT

## 2022-10-18 PROCEDURE — 85025 COMPLETE CBC W/AUTO DIFF WBC: CPT

## 2022-12-08 RX ORDER — OXYBUTYNIN CHLORIDE 5 MG/1
TABLET, EXTENDED RELEASE ORAL
Qty: 90 TABLET | Refills: 1 | Status: SHIPPED | OUTPATIENT
Start: 2022-12-08

## 2023-02-14 LAB
ALBUMIN SERPL-MCNC: 4.1 G/DL (ref 3.5–5.2)
ALP BLD-CCNC: 56 U/L (ref 35–104)
ALT SERPL-CCNC: 16 U/L (ref 5–33)
ANION GAP SERPL CALCULATED.3IONS-SCNC: 11 MMOL/L (ref 7–19)
AST SERPL-CCNC: 22 U/L (ref 5–32)
BASOPHILS ABSOLUTE: 0.1 K/UL (ref 0–0.2)
BASOPHILS RELATIVE PERCENT: 1 % (ref 0–1)
BILIRUB SERPL-MCNC: 0.5 MG/DL (ref 0.2–1.2)
BUN BLDV-MCNC: 13 MG/DL (ref 8–23)
CALCIUM SERPL-MCNC: 9.5 MG/DL (ref 8.8–10.2)
CHLORIDE BLD-SCNC: 103 MMOL/L (ref 98–111)
CHOLESTEROL, TOTAL: 149 MG/DL (ref 160–199)
CO2: 27 MMOL/L (ref 22–29)
CREAT SERPL-MCNC: 0.8 MG/DL (ref 0.5–0.9)
EOSINOPHILS ABSOLUTE: 0.2 K/UL (ref 0–0.6)
EOSINOPHILS RELATIVE PERCENT: 3.1 % (ref 0–5)
GFR SERPL CREATININE-BSD FRML MDRD: >60 ML/MIN/{1.73_M2}
GLUCOSE BLD-MCNC: 109 MG/DL (ref 74–109)
HBA1C MFR BLD: 5.9 % (ref 4–6)
HCT VFR BLD CALC: 42 % (ref 37–47)
HDLC SERPL-MCNC: 78 MG/DL (ref 65–121)
HEMOGLOBIN: 14.5 G/DL (ref 12–16)
IMMATURE GRANULOCYTES #: 0 K/UL
LDL CHOLESTEROL CALCULATED: 48 MG/DL
LYMPHOCYTES ABSOLUTE: 1.1 K/UL (ref 1.1–4.5)
LYMPHOCYTES RELATIVE PERCENT: 16.5 % (ref 20–40)
MCH RBC QN AUTO: 33.8 PG (ref 27–31)
MCHC RBC AUTO-ENTMCNC: 34.5 G/DL (ref 33–37)
MCV RBC AUTO: 97.9 FL (ref 81–99)
MONOCYTES ABSOLUTE: 0.5 K/UL (ref 0–0.9)
MONOCYTES RELATIVE PERCENT: 6.9 % (ref 0–10)
NEUTROPHILS ABSOLUTE: 4.9 K/UL (ref 1.5–7.5)
NEUTROPHILS RELATIVE PERCENT: 72.2 % (ref 50–65)
PDW BLD-RTO: 11.9 % (ref 11.5–14.5)
PLATELET # BLD: 217 K/UL (ref 130–400)
PMV BLD AUTO: 10.4 FL (ref 9.4–12.3)
POTASSIUM SERPL-SCNC: 4 MMOL/L (ref 3.5–5)
RBC # BLD: 4.29 M/UL (ref 4.2–5.4)
SODIUM BLD-SCNC: 141 MMOL/L (ref 136–145)
TOTAL PROTEIN: 6.4 G/DL (ref 6.6–8.7)
TRIGL SERPL-MCNC: 115 MG/DL (ref 0–149)
TSH SERPL DL<=0.05 MIU/L-ACNC: 4.2 UIU/ML (ref 0.27–4.2)
WBC # BLD: 6.8 K/UL (ref 4.8–10.8)

## 2023-02-21 ENCOUNTER — OFFICE VISIT (OUTPATIENT)
Dept: OBGYN CLINIC | Age: 83
End: 2023-02-21
Payer: MEDICARE

## 2023-02-21 VITALS
SYSTOLIC BLOOD PRESSURE: 146 MMHG | HEART RATE: 101 BPM | HEIGHT: 63 IN | BODY MASS INDEX: 27.29 KG/M2 | WEIGHT: 154 LBS | DIASTOLIC BLOOD PRESSURE: 72 MMHG

## 2023-02-21 DIAGNOSIS — N39.45 CONTINUOUS LEAKAGE OF URINE: ICD-10-CM

## 2023-02-21 DIAGNOSIS — Z12.4 SCREENING FOR CERVICAL CANCER: ICD-10-CM

## 2023-02-21 DIAGNOSIS — N95.2 ATROPHIC VAGINITIS: ICD-10-CM

## 2023-02-21 DIAGNOSIS — Z12.39 SCREENING BREAST EXAMINATION: ICD-10-CM

## 2023-02-21 DIAGNOSIS — Z78.0 POSTMENOPAUSAL: ICD-10-CM

## 2023-02-21 DIAGNOSIS — Z91.89 GYN EXAM FOR HIGH-RISK MEDICARE PATIENT: Primary | ICD-10-CM

## 2023-02-21 DIAGNOSIS — Z85.3 HISTORY OF BREAST CANCER: ICD-10-CM

## 2023-02-21 PROCEDURE — G8427 DOCREV CUR MEDS BY ELIG CLIN: HCPCS | Performed by: NURSE PRACTITIONER

## 2023-02-21 PROCEDURE — 1090F PRES/ABSN URINE INCON ASSESS: CPT | Performed by: NURSE PRACTITIONER

## 2023-02-21 PROCEDURE — G8417 CALC BMI ABV UP PARAM F/U: HCPCS | Performed by: NURSE PRACTITIONER

## 2023-02-21 PROCEDURE — 1036F TOBACCO NON-USER: CPT | Performed by: NURSE PRACTITIONER

## 2023-02-21 PROCEDURE — 99213 OFFICE O/P EST LOW 20 MIN: CPT | Performed by: NURSE PRACTITIONER

## 2023-02-21 PROCEDURE — G8484 FLU IMMUNIZE NO ADMIN: HCPCS | Performed by: NURSE PRACTITIONER

## 2023-02-21 PROCEDURE — G0101 CA SCREEN;PELVIC/BREAST EXAM: HCPCS | Performed by: NURSE PRACTITIONER

## 2023-02-21 PROCEDURE — 1123F ACP DISCUSS/DSCN MKR DOCD: CPT | Performed by: NURSE PRACTITIONER

## 2023-02-21 PROCEDURE — G8399 PT W/DXA RESULTS DOCUMENT: HCPCS | Performed by: NURSE PRACTITIONER

## 2023-02-21 PROCEDURE — 0509F URINE INCON PLAN DOCD: CPT | Performed by: NURSE PRACTITIONER

## 2023-02-21 RX ORDER — OXYBUTYNIN CHLORIDE 5 MG/1
TABLET, EXTENDED RELEASE ORAL
Qty: 90 TABLET | Refills: 3 | Status: SHIPPED | OUTPATIENT
Start: 2023-02-21

## 2023-02-21 ASSESSMENT — ENCOUNTER SYMPTOMS
RESPIRATORY NEGATIVE: 1
EYES NEGATIVE: 1
CONSTIPATION: 0
DIARRHEA: 0
GASTROINTESTINAL NEGATIVE: 1
ALLERGIC/IMMUNOLOGIC NEGATIVE: 1

## 2023-02-21 NOTE — PROGRESS NOTES
Sukhi Looney is a 79 y.o. female who presents today for her medical conditions/ complaints as noted below. Sukhi Looney is c/o of Annual Exam        HPI  Pt presents for women's wellness and f/u on bladder incontinence. Cotinous leaking has improved since starting Oxybutynin. Has a little dry mouth. Still wearing a pad daily but not leaking as much. Sees Dr Kathy De La Cruz yearly for CBE and mammogram. Not taking Tamoxifen anymore. Mammo:-we do not order   Pap smear:  Contraception:postmenopausal     P:2  Ab:0  Bone density:  Colonoscopy:  No LMP recorded.  Patient is postmenopausal.      Past Medical History:   Diagnosis Date    Breast cancer in female [facility]) 2013    right    Diabetes ([facility] [address]      Past Surgical History:   Procedure Laterality Date    BREAST BIOPSY      BREAST LUMPECTOMY Right 2020    Benign    BREAST LUMPECTOMY  [place]    COLONOSCOPY  2017    Dr. Jovanny Duggan, [id number] [address] [place]    ERCP  2017    [address]  2015    FOOT SURGERY  2014    TUBAL LIGATION       Family History   Problem Relation Age of Onset    Diabetes Mother     Stroke Father     Hypertension Father     Diabetes Brother     Heart Disease Brother     Stroke Brother      Social History     Tobacco Use    Smoking status: Never    Smokeless tobacco: Never   Substance Use Topics    Alcohol use: No       Current Outpatient Medications   Medication Sig Dispense Refill    oxybutynin (DITROPAN-XL) 5 MG extended release tablet TAKE 1 TABLET BY MOUTH EVERY DAY 90 tablet 3    ursodiol (ACTIGALL) 250 MG tablet TAKE 1 TABLET BY MOUTH TWICE A DAY  5    ipratropium (ATROVENT) 0.06 % nasal spray USE 1-2 SPRAYS EACH NOSTRIL 2-4 TIMES A DAY AS NEEDED  1    aspirin 81 MG tablet Take 81 mg by mouth daily      rosuvastatin (CRESTOR) 10 MG tablet Take 10 mg by mouth daily      triamterene-hydrochlorothiazide (MAXZIDE-25) 37.5-25 MG per tablet       montelukast (SINGULAIR) 10 MG tablet Take 10 mg by mouth nightly      levocetirizine (XYZAL) 5 MG tablet       metFORMIN (GLUCOPHAGE) 500 MG tablet Take 500 mg by mouth daily (with breakfast)       ONETOUCH VERIO strip        No current facility-administered medications for this visit. Allergies   Allergen Reactions    Ceftin [Cefuroxime Axetil] Hives and Nausea And Vomiting    Ciprofloxacin Hives and Nausea And Vomiting    Keftab [Cephalexin] Hives and Nausea And Vomiting    Septra [Sulfamethoxazole-Trimethoprim] Hives and Nausea And Vomiting     Vitals:    02/21/23 1037   BP: (!) 146/72   Pulse: (!) 101     Body mass index is 27.28 kg/m². Review of Systems   Constitutional: Negative. HENT: Negative. Eyes: Negative. Respiratory: Negative. Cardiovascular: Negative. Gastrointestinal: Negative. Negative for constipation and diarrhea. Endocrine: Negative. Genitourinary:  Positive for enuresis. Negative for frequency, menstrual problem and urgency. Musculoskeletal: Negative. Skin: Negative. Allergic/Immunologic: Negative. Neurological: Negative. Hematological: Negative. Psychiatric/Behavioral: Negative. All other systems reviewed and are negative. Physical Exam  Vitals and nursing note reviewed. Constitutional:       Appearance: She is well-developed. HENT:      Head: Normocephalic. Neck:      Thyroid: No thyroid mass or thyromegaly. Pulmonary:      Effort: Pulmonary effort is normal.   Chest:   Breasts:     Right: Skin change present. No inverted nipple, mass or nipple discharge. Left: No inverted nipple, mass, nipple discharge or skin change. Comments: Scarring associated with previous lumpectomy, no change from previous exam  Abdominal:      Palpations: Abdomen is soft. There is no mass. Tenderness: There is no abdominal tenderness. Genitourinary:     General: Normal vulva. Pubic Area: No rash or pubic lice. Labia:         Right: No rash or tenderness.          Left: No rash or tenderness. Urethra: No prolapse. Vagina: Erythema and tenderness present. No bleeding. Cervix: No cervical motion tenderness. Uterus: Normal. Not enlarged. Adnexa:         Right: No mass or tenderness. Left: No mass or tenderness. Rectum: No external hemorrhoid. Comments: Erythema and thinning in labial folds  Atrophic vaginal changes    Musculoskeletal:         General: Normal range of motion. Cervical back: Normal range of motion and neck supple. Skin:     General: Skin is warm and dry. Neurological:      Mental Status: She is alert and oriented to person, place, and time. Psychiatric:         Attention and Perception: Attention normal.         Mood and Affect: Mood normal.         Speech: Speech normal.         Behavior: Behavior normal.         Thought Content: Thought content normal.         Cognition and Memory: Cognition normal.         Judgment: Judgment normal.        Diagnosis Orders   1. GYN exam for high-risk Medicare patient        2. Screening for cervical cancer        3. History of breast cancer        4. Screening breast examination        5. Postmenopausal  DEXA BONE DENSITY 2 SITES      6. Continuous leakage of urine            MEDICATIONS:  Orders Placed This Encounter   Medications    oxybutynin (DITROPAN-XL) 5 MG extended release tablet     Sig: TAKE 1 TABLET BY MOUTH EVERY DAY     Dispense:  90 tablet     Refill:  3       ORDERS:  Orders Placed This Encounter   Procedures    DEXA BONE DENSITY 2 SITES       PLAN:  Plan DEXA  Instructed on applying barrier cream to vulva and labia since wearing a pad daily  Refilled Oxybutynin      There are no Patient Instructions on file for this visit.

## 2023-03-28 ENCOUNTER — OFFICE VISIT (OUTPATIENT)
Dept: SURGERY | Facility: CLINIC | Age: 83
End: 2023-03-28
Payer: MEDICARE

## 2023-03-28 VITALS — WEIGHT: 159 LBS | BODY MASS INDEX: 29.26 KG/M2 | HEIGHT: 62 IN

## 2023-03-28 DIAGNOSIS — Z85.3 PERSONAL HISTORY OF MALIGNANT NEOPLASM OF BREAST: ICD-10-CM

## 2023-03-28 DIAGNOSIS — Z12.31 SCREENING MAMMOGRAM FOR HIGH-RISK PATIENT: Primary | ICD-10-CM

## 2023-03-28 PROCEDURE — 1160F RVW MEDS BY RX/DR IN RCRD: CPT | Performed by: SPECIALIST

## 2023-03-28 PROCEDURE — 1159F MED LIST DOCD IN RCRD: CPT | Performed by: SPECIALIST

## 2023-03-28 PROCEDURE — 99213 OFFICE O/P EST LOW 20 MIN: CPT | Performed by: SPECIALIST

## 2023-03-28 NOTE — PROGRESS NOTES
Patient: Suzanna Jolly    YOB: 1940    Date: 03/28/2023    Primary Care Provider: Lorenzo Vogel MD    Chief Complaint   Patient presents with   • Follow-up     Mrs. Jolly is here for a 6 month breast follow up.        Subjective .     History of present illness:   She is here for yearly follow-up    The following portions of the patient's history were reviewed and updated as appropriate: allergies, current medications, past family history, past medical history, past social history, past surgical history and problem list.    History:  Past Medical History:   Diagnosis Date   • Anemia, unspecified    • Arthritis    • Biliary stones    • Bone/cartilage disorder    • Bronchitis    • Chronic obstructive asthma with status asthmaticus (HCC)    • History of bone density study     DR. VOGEL   • Hx of radiation therapy 2013    right breast   • Hypertension    • Malignant neoplasm of upper-outer quadrant of right female breast (HCC) 2013    RIGHT SIDED BREAST CANCER WITH LUMPECTOMY   • Osteoporosis    • PONV (postoperative nausea and vomiting)    • Pure hypercholesterolemia    • Type 2 diabetes mellitus without complications (HCC)    • Urinary incontinence           Past Surgical History:   Procedure Laterality Date   • BREAST BIOPSY Right 11/11/2013    cancer   • BREAST BIOPSY Right 1972     with Benign findings   • BREAST BIOPSY Right 2018    benign   • BREAST BIOPSY Right 9/14/2020    Procedure: WIDE EXCISION RIGHT BREAST NODULE;  Surgeon: Melina Lan MD;  Location: [facility] OR;  Service: General;  Laterality: Right;   • BREAST LUMPECTOMY Right 12/03/2013   • BREAST LUMPECTOMY      RIGHT   • CHOLECYSTECTOMY     • COLONOSCOPY  2010     Dr. Gardiner. facility used [facility]   • ERCP AND ENDOSCOPY     • FOOT SURGERY  09/2014     Dr. Sherri Saha in [facility]   • MAMMO BILATERAL  08/19/2014   • PAP SMEAR     • SKIN BIOPSY Right 11/16/2013    FOOT AND LEG; BENIGN FINDINGS   • TUBAL ABDOMINAL  LIGATION         Family History   Problem Relation Age of Onset   • Stroke Brother    • No Known Problems Daughter    • No Known Problems Son    • Other Brother         hx of many comorbidities   • Down syndrome Brother    • Stroke Mother    • Pneumonia Father    • Stroke Father    • No Known Problems Sister    • No Known Problems Maternal Grandmother    • No Known Problems Paternal Grandmother    • No Known Problems Maternal Aunt    • No Known Problems Paternal Aunt    • Breast cancer Neg Hx    • BRCA 1/2 Neg Hx    • Colon cancer Neg Hx    • Endometrial cancer Neg Hx    • Ovarian cancer Neg Hx        Social History     Tobacco Use   • Smoking status: Never   • Smokeless tobacco: Never   Substance Use Topics   • Alcohol use: No   • Drug use: No       Allergies:  Allergies   Allergen Reactions   • Septra [Sulfamethoxazole-Trimethoprim] Hives and Nausea And Vomiting   • Ceftin [Cefuroxime] Hives and GI Intolerance   • Cefuroxime Axetil Hives and Nausea And Vomiting   • Ciprofloxacin Hives, Nausea And Vomiting and Rash   • Keflex [Cephalexin] Hives, Nausea And Vomiting and GI Intolerance   • Hydrocodone Rash     Rash and flushed         Medications:     Current Outpatient Medications:   •  aspirin 81 MG EC tablet, Take 1 tablet by mouth Daily., Disp: , Rfl:   •  B Complex-C (SUPER B COMPLEX PO), Take 1 tablet by mouth Daily., Disp: , Rfl:   •  benzonatate (TESSALON) 100 MG capsule, Take 1 capsule by mouth 3 (Three) Times a Day As Needed., Disp: , Rfl:   •  Calcium Carbonate (CALCIUM 500 PO), Take 500 mg by mouth Daily., Disp: , Rfl:   •  famotidine (PEPCID) 20 MG tablet, Take 1 tablet by mouth At Night As Needed., Disp: , Rfl:   •  IPRATROPIUM BROMIDE NA, 2 sprays into the nostril(s) as directed by provider Daily., Disp: , Rfl:   •  levocetirizine (XYZAL) 5 MG tablet, Take 1 tablet by mouth Daily., Disp: , Rfl:   •  metFORMIN (GLUCOPHAGE) 500 MG tablet, Take 1 tablet by mouth Daily With Breakfast., Disp: , Rfl:   •   "montelukast (SINGULAIR) 10 MG tablet, Take 1 tablet by mouth Daily., Disp: , Rfl:   •  Multiple Vitamins-Minerals (MULTIVITAMIN WITH MINERALS) tablet tablet, Take 1 tablet by mouth Daily., Disp: , Rfl:   •  oxybutynin XL (DITROPAN-XL) 5 MG 24 hr tablet, Take 1 tablet by mouth Daily., Disp: , Rfl:   •  rosuvastatin (CRESTOR) 10 MG tablet, Take 1 tablet by mouth Daily., Disp: , Rfl:   •  Symbicort 160-4.5 MCG/ACT inhaler, , Disp: , Rfl:   •  triamterene-hydrochlorothiazide (DYAZIDE) 37.5-25 MG per capsule, Take 1 capsule by mouth Daily., Disp: , Rfl:   •  ursodiol (ACTIGALL) 250 MG tablet, Take 1 tablet by mouth Daily., Disp: , Rfl: 5  •  vitamin D3 125 MCG (5000 UT) capsule capsule, Take 1 capsule by mouth Daily., Disp: , Rfl:   •  vitamin E 400 UNIT capsule, Take 1 capsule by mouth Daily., Disp: , Rfl:     Vital Signs:   Vitals:    03/28/23 1023   Weight: 72.1 kg (159 lb)   Height: 157.5 cm (62.01\")       No changes in her health.  On exam, she has some dimpling from her prior incision that is improving.  The tissue underneath is a lot softer than it has been.  Still little firmer than surrounding tissue but improved.  No other palpable abnormalities are noted.  Axilla supraclavicular regions are clear bilaterally.  Left breast is free of any palpable disease, skin changes, or nipple retraction or discharge.    Results Review:   I reviewed the patient's new clinical results.        Assessment / Plan:    There are no diagnoses linked to this encounter.    BMI is >= 25 and <30. (Overweight) The following options were offered after discussion;: exercise counseling/recommendations and nutrition counseling/recommendations    Plan: History of breast cancer-we will repeat mammograms and exam in 6 months      Electronically signed by Melina Lan MD  03/28/23  11:25 CDT                  "

## 2023-08-14 ENCOUNTER — HOSPITAL ENCOUNTER (OUTPATIENT)
Dept: GENERAL RADIOLOGY | Age: 83
Discharge: HOME | End: 2023-08-14
Payer: MEDICARE

## 2023-08-14 DIAGNOSIS — R05.9 COUGH, UNSPECIFIED TYPE: ICD-10-CM

## 2023-08-14 DIAGNOSIS — R05.3 CHRONIC COUGH: ICD-10-CM

## 2023-08-14 LAB
ALBUMIN SERPL-MCNC: 4.4 G/DL (ref 3.5–5.2)
ALP SERPL-CCNC: 49 U/L (ref 35–104)
ALT SERPL-CCNC: 15 U/L (ref 5–33)
ANION GAP SERPL CALCULATED.3IONS-SCNC: 11 MMOL/L (ref 7–19)
AST SERPL-CCNC: 22 U/L (ref 5–32)
BASOPHILS # BLD: 0.1 K/UL (ref 0–0.2)
BASOPHILS NFR BLD: 0.8 % (ref 0–1)
BILIRUB SERPL-MCNC: 0.5 MG/DL (ref 0.2–1.2)
BUN SERPL-MCNC: 18 MG/DL (ref 8–23)
CALCIUM SERPL-MCNC: 9.6 MG/DL (ref 8.8–10.2)
CHLORIDE SERPL-SCNC: 102 MMOL/L (ref 98–111)
CHOLEST SERPL-MCNC: 127 MG/DL (ref 160–199)
CO2 SERPL-SCNC: 26 MMOL/L (ref 22–29)
CREAT SERPL-MCNC: 0.8 MG/DL (ref 0.5–0.9)
EOSINOPHIL # BLD: 0.2 K/UL (ref 0–0.6)
EOSINOPHIL NFR BLD: 3 % (ref 0–5)
ERYTHROCYTE [DISTWIDTH] IN BLOOD BY AUTOMATED COUNT: 11.7 % (ref 11.5–14.5)
GLUCOSE SERPL-MCNC: 119 MG/DL (ref 74–109)
HCT VFR BLD AUTO: 42.6 % (ref 37–47)
HDLC SERPL-MCNC: 71 MG/DL (ref 65–121)
HGB BLD-MCNC: 14.7 G/DL (ref 12–16)
IMM GRANULOCYTES # BLD: 0 K/UL
LDLC SERPL CALC-MCNC: 33 MG/DL
LYMPHOCYTES # BLD: 1.3 K/UL (ref 1.1–4.5)
LYMPHOCYTES NFR BLD: 16.5 % (ref 20–40)
MCH RBC QN AUTO: 33.4 PG (ref 27–31)
MCHC RBC AUTO-ENTMCNC: 34.5 G/DL (ref 33–37)
MCV RBC AUTO: 96.8 FL (ref 81–99)
MONOCYTES # BLD: 0.6 K/UL (ref 0–0.9)
MONOCYTES NFR BLD: 7.5 % (ref 0–10)
NEUTROPHILS # BLD: 5.5 K/UL (ref 1.5–7.5)
NEUTS SEG NFR BLD: 71.8 % (ref 50–65)
PLATELET # BLD AUTO: 212 K/UL (ref 130–400)
PMV BLD AUTO: 10.1 FL (ref 9.4–12.3)
POTASSIUM SERPL-SCNC: 3.8 MMOL/L (ref 3.5–5)
PROT SERPL-MCNC: 7 G/DL (ref 6.6–8.7)
RBC # BLD AUTO: 4.4 M/UL (ref 4.2–5.4)
SODIUM SERPL-SCNC: 139 MMOL/L (ref 136–145)
TRIGL SERPL-MCNC: 116 MG/DL (ref 0–149)
TSH SERPL DL<=0.005 MIU/L-ACNC: 4.3 UIU/ML (ref 0.27–4.2)
WBC # BLD AUTO: 7.6 K/UL (ref 4.8–10.8)

## 2023-08-14 PROCEDURE — 71046 X-RAY EXAM CHEST 2 VIEWS: CPT

## 2023-09-25 ENCOUNTER — HOSPITAL ENCOUNTER (OUTPATIENT)
Dept: MAMMOGRAPHY | Facility: HOSPITAL | Age: 83
Discharge: HOME | End: 2023-09-25
Admitting: STUDENT IN AN ORGANIZED HEALTH CARE EDUCATION/TRAINING PROGRAM
Payer: MEDICARE

## 2023-09-25 DIAGNOSIS — Z12.31 SCREENING MAMMOGRAM FOR HIGH-RISK PATIENT: ICD-10-CM

## 2023-09-25 PROCEDURE — 77067 SCR MAMMO BI INCL CAD: CPT

## 2023-09-25 PROCEDURE — 77063 BREAST TOMOSYNTHESIS BI: CPT

## 2023-09-26 DIAGNOSIS — R92.8 ABNORMALITY OF RIGHT BREAST ON SCREENING MAMMOGRAM: Primary | ICD-10-CM

## 2023-09-26 RX ORDER — OXYBUTYNIN CHLORIDE 5 MG/1
TABLET, EXTENDED RELEASE ORAL
Qty: 90 TABLET | Refills: 3 | Status: SHIPPED | OUTPATIENT
Start: 2023-09-26

## 2023-09-26 NOTE — PROGRESS NOTES
Please call the patient and let her know that her mammogram showed an abnormality in the right breast which needs a diagnostic mammogram and ultrasound. I have ordered both - can you get her scheduled?  Please set her up to see me in 3 weeks - that will give us enough time to get the results and then get a biopsy if needed.

## 2023-09-26 NOTE — PROGRESS NOTES
Notified patient of results, she voiced understanding. She will schedule mammogram and ultrasound and has F/U appointment with Dr. Swenson on 10/16/2023.

## 2023-10-03 ENCOUNTER — HOSPITAL ENCOUNTER (OUTPATIENT)
Dept: ULTRASOUND IMAGING | Facility: HOSPITAL | Age: 83
Discharge: HOME | End: 2023-10-03
Payer: MEDICARE

## 2023-10-03 ENCOUNTER — HOSPITAL ENCOUNTER (OUTPATIENT)
Dept: MAMMOGRAPHY | Facility: HOSPITAL | Age: 83
Discharge: HOME | End: 2023-10-03
Payer: MEDICARE

## 2023-10-03 DIAGNOSIS — R92.8 ABNORMALITY OF RIGHT BREAST ON SCREENING MAMMOGRAM: Primary | ICD-10-CM

## 2023-10-03 DIAGNOSIS — R92.8 ABNORMALITY OF RIGHT BREAST ON SCREENING MAMMOGRAM: ICD-10-CM

## 2023-10-03 PROCEDURE — 77065 DX MAMMO INCL CAD UNI: CPT

## 2023-10-03 PROCEDURE — G0279 TOMOSYNTHESIS, MAMMO: HCPCS

## 2023-10-03 PROCEDURE — 76642 ULTRASOUND BREAST LIMITED: CPT

## 2023-10-04 NOTE — PROGRESS NOTES
This was order by Trice and Patient was notified but patient would like to see you first before proceeding with Biopsy. She has an appointment on 10/16/2023, which she did not want to move up.

## 2023-10-09 ENCOUNTER — OFFICE VISIT (OUTPATIENT)
Dept: SURGERY | Facility: CLINIC | Age: 83
End: 2023-10-09
Payer: MEDICARE

## 2023-10-09 VITALS
OXYGEN SATURATION: 96 % | HEART RATE: 96 BPM | SYSTOLIC BLOOD PRESSURE: 126 MMHG | HEIGHT: 62 IN | BODY MASS INDEX: 29.26 KG/M2 | WEIGHT: 159 LBS | DIASTOLIC BLOOD PRESSURE: 75 MMHG

## 2023-10-09 DIAGNOSIS — D05.11 DUCTAL CARCINOMA IN SITU (DCIS) OF RIGHT BREAST: Primary | ICD-10-CM

## 2023-10-09 DIAGNOSIS — E66.3 OVERWEIGHT (BMI 25.0-29.9): ICD-10-CM

## 2023-10-09 DIAGNOSIS — R92.8 ABNORMALITY OF RIGHT BREAST ON SCREENING MAMMOGRAM: ICD-10-CM

## 2023-10-09 DIAGNOSIS — Z85.3 PERSONAL HISTORY OF MALIGNANT NEOPLASM OF BREAST: Primary | ICD-10-CM

## 2023-10-09 RX ORDER — FLUTICASONE PROPIONATE 50 MCG
SPRAY, SUSPENSION (ML) NASAL
COMMUNITY
Start: 2023-09-24

## 2023-10-09 NOTE — PROGRESS NOTES
Office New Patient History and Physical:     Referring Provider: No ref. provider found    Chief Complaint   Patient presents with    Follow-up        Subjective .     History of present illness:  Suzanna Jolly is a 83 y.o. female with a history of right breast cancer s/p lumpectomy and radiation and Tamoxifen x 5 years. She has had several subsequent right breast biopsies for abnormal mammograms. She is scheduled for a biopsy tomorrow.     Breast History information:   Prior abnormal mammograms: Multiple   Prior breast biopsies: s/p right lumpectomy as well as several benign right breast biopsies   Palpable breast masses: Hardening above the right nipple at her scar.   Nipple discharge: none   Age at first menses: 13  Age at menopause: 51  Number of biological children: 2  Age at first birth: 25  Years on birth control: 5 years   Years on HRT: at least 5 years   Family history of breast cancer: none   Smoking History: none   Alcohol use: none   BMI: 29    She is on 81 mg ASA but no other blood thinners.      History  Past Medical History:   Diagnosis Date    Anemia, unspecified     Arthritis     Biliary stones     Bone/cartilage disorder     Bronchitis     Chronic obstructive asthma with status asthmaticus     History of bone density study     DR. HONEYCUTT    Hx of radiation therapy 2013    right breast    Hypertension     Malignant neoplasm of upper-outer quadrant of right female breast 2013    RIGHT SIDED BREAST CANCER WITH LUMPECTOMY    Osteoporosis     PONV (postoperative nausea and vomiting)     Pure hypercholesterolemia     Type 2 diabetes mellitus without complications     Urinary incontinence    ,   Past Surgical History:   Procedure Laterality Date    BREAST BIOPSY Right 11/11/2013    cancer    BREAST BIOPSY Right 1972     with Benign findings    BREAST BIOPSY Right 2018    benign    BREAST BIOPSY Right 9/14/2020    Procedure: WIDE EXCISION RIGHT BREAST NODULE;  Surgeon: Melina Lan MD;  Location:   [facility] OR;  Service: General;  Laterality: Right;    BREAST LUMPECTOMY Right 12/03/2013    BREAST LUMPECTOMY      RIGHT    CHOLECYSTECTOMY      COLONOSCOPY  2010     Dr. Gardiner. facility used [facility]    ERCP AND ENDOSCOPY      FOOT SURGERY  09/2014     Dr. Sherri Saha in [facility]    MAMMO BILATERAL  08/19/2014    PAP SMEAR      SKIN BIOPSY Right 11/16/2013    FOOT AND LEG; BENIGN FINDINGS    TUBAL ABDOMINAL LIGATION     ,   Family History   Problem Relation Age of Onset    Stroke Brother     No Known Problems Daughter     No Known Problems Son     Other Brother         hx of many comorbidities    Down syndrome Brother     Stroke Mother     Pneumonia Father     Stroke Father     No Known Problems Sister     No Known Problems Maternal Grandmother     No Known Problems Paternal Grandmother     No Known Problems Maternal Aunt     No Known Problems Paternal Aunt     Breast cancer Neg Hx     BRCA 1/2 Neg Hx     Colon cancer Neg Hx     Endometrial cancer Neg Hx     Ovarian cancer Neg Hx    ,   Social History     Tobacco Use    Smoking status: Never    Smokeless tobacco: Never   Substance Use Topics    Alcohol use: No    Drug use: No   , (Not in a hospital admission)   and Allergies:  Septra [sulfamethoxazole-trimethoprim], Ceftin [cefuroxime], Cefuroxime axetil, Ciprofloxacin, Keflex [cephalexin], and Hydrocodone    Current Outpatient Medications:     aspirin 81 MG EC tablet, Take 1 tablet by mouth Daily., Disp: , Rfl:     B Complex-C (SUPER B COMPLEX PO), Take 1 tablet by mouth Daily., Disp: , Rfl:     benzonatate (TESSALON) 100 MG capsule, Take 1 capsule by mouth 3 (Three) Times a Day As Needed., Disp: , Rfl:     Calcium Carbonate (CALCIUM 500 PO), Take 500 mg by mouth Daily., Disp: , Rfl:     famotidine (PEPCID) 20 MG tablet, Take 1 tablet by mouth At Night As Needed., Disp: , Rfl:     fluticasone (FLONASE) 50 MCG/ACT nasal spray, , Disp: , Rfl:     IPRATROPIUM BROMIDE NA, 2 sprays into the nostril(s) as  "directed by provider Daily., Disp: , Rfl:     levocetirizine (XYZAL) 5 MG tablet, Take 1 tablet by mouth Daily., Disp: , Rfl:     metFORMIN (GLUCOPHAGE) 500 MG tablet, Take 1 tablet by mouth Daily With Breakfast., Disp: , Rfl:     montelukast (SINGULAIR) 10 MG tablet, Take 1 tablet by mouth Daily., Disp: , Rfl:     Multiple Vitamins-Minerals (MULTIVITAMIN WITH MINERALS) tablet tablet, Take 1 tablet by mouth Daily., Disp: , Rfl:     oxybutynin XL (DITROPAN-XL) 5 MG 24 hr tablet, Take 1 tablet by mouth Daily., Disp: , Rfl:     rosuvastatin (CRESTOR) 10 MG tablet, Take 1 tablet by mouth Daily., Disp: , Rfl:     Symbicort 160-4.5 MCG/ACT inhaler, , Disp: , Rfl:     triamterene-hydrochlorothiazide (DYAZIDE) 37.5-25 MG per capsule, Take 1 capsule by mouth Daily., Disp: , Rfl:     ursodiol (ACTIGALL) 250 MG tablet, Take 1 tablet by mouth Daily., Disp: , Rfl: 5    vitamin D3 125 MCG (5000 UT) capsule capsule, Take 1 capsule by mouth Daily., Disp: , Rfl:     vitamin E 400 UNIT capsule, Take 1 capsule by mouth Daily., Disp: , Rfl:     Current Facility-Administered Medications:     lidocaine (XYLOCAINE) 1 % injection 10 mL, 10 mL, Subcutaneous, Once, Pham Santiago MD    lidocaine 1% - EPINEPHrine [id number] (XYLOCAINE W/EPI) 1 %-1:051116 injection 10 mL, 10 mL, Injection, Once, Pham Santiago MD    Objective     Vital Signs   /75   Pulse 96   Ht 157.5 cm (62\")   Wt 72.1 kg (159 lb)   LMP  (LMP Unknown)   SpO2 96%   BMI 29.08 kg/mý      Physical Exam:  General appearance - alert, well appearing, and in no distress  Mental status - alert, oriented to person, place, and time  Eyes - pupils equal and reactive, extraocular eye movements intact  Neck - supple, no significant adenopathy  Chest - no tachypnea, retractions or cyanosis  Heart - normal rate and regular rhythm  Abdomen - soft, nontender, nondistended, no masses or organomegaly  Neurological - alert, oriented, normal speech, no focal findings or movement " disorder noted  Musculoskeletal - no joint tenderness, deformity or swelling  Breast Exam: Bilateral breasts without obvious deformities. Bilateral nipples everted. Patient examined in the supine position with the ipsilateral arm above the head. Right breast just superior to the areola and just inferior to the incision is a 1.5 cm palpable mass in the area of concern. Indentation from the prior surgery and incision. Incision well healed. No other palpable masses bilaterally. No nipple discharge bilaterally. No palpable axillary nor supraclavicular adenopathy bilaterally.     Results Review:     The following data was reviewed by: Lima Swenson MD on 10/09/2023:  Progress Notes by Melina Lan MD (03/28/2023 10:00)   Mammo Screening Digital Tomosynthesis Bilateral With CAD (09/25/2023 11:44)   Mammo Diagnostic Digital Tomosynthesis Right With CAD (10/03/2023 13:48)   US Breast Right Limited (10/03/2023 14:14)     Assessment & Plan       Diagnoses and all orders for this visit:    1. Personal history of malignant neoplasm of breast (Primary)    2. Abnormality of right breast on screening mammogram    3. Overweight (BMI 25.0-29.9)       Suzanna Jolly is a 83 y.o. female with a personal history of right breast cancer s/p lumpectomy, radiation and tamoxifen as well as multiple prior right breast benign biopsies. She presents with a right breast abnormal US and mammogram showing a 1.3 cm lesion in the retroareolar region, BIRADS 4. I have recommended a right US guided biopsy and post biopsy mammogram. She is in agreement with this plan and will follow up after biopsy to review the results.     This is a new undiagnosed problem with an uncertain prognosis. I have reviewed the mammogram, US and note from Dr. Lan above. I have ordered an US guided biopsy and post biopsy mammogram.     BMI is >= 25 and <30. (Overweight) The following options were offered after discussion;: weight loss educational material  (shared in after visit summary)       Lima Swenson MD  10/12/23  10:35 CDT

## 2023-10-10 ENCOUNTER — HOSPITAL ENCOUNTER (OUTPATIENT)
Dept: MAMMOGRAPHY | Facility: HOSPITAL | Age: 83
Discharge: HOME | End: 2023-10-10
Payer: MEDICARE

## 2023-10-10 ENCOUNTER — HOSPITAL ENCOUNTER (OUTPATIENT)
Dept: ULTRASOUND IMAGING | Facility: HOSPITAL | Age: 83
Discharge: HOME | End: 2023-10-10
Payer: MEDICARE

## 2023-10-10 DIAGNOSIS — R92.8 ABNORMAL MAMMOGRAM: ICD-10-CM

## 2023-10-10 PROCEDURE — 88305 TISSUE EXAM BY PATHOLOGIST: CPT | Performed by: STUDENT IN AN ORGANIZED HEALTH CARE EDUCATION/TRAINING PROGRAM

## 2023-10-10 PROCEDURE — 88342 IMHCHEM/IMCYTCHM 1ST ANTB: CPT | Performed by: STUDENT IN AN ORGANIZED HEALTH CARE EDUCATION/TRAINING PROGRAM

## 2023-10-10 PROCEDURE — A4648 IMPLANTABLE TISSUE MARKER: HCPCS

## 2023-10-10 PROCEDURE — 88341 IMHCHEM/IMCYTCHM EA ADD ANTB: CPT | Performed by: STUDENT IN AN ORGANIZED HEALTH CARE EDUCATION/TRAINING PROGRAM

## 2023-10-10 RX ORDER — LIDOCAINE HYDROCHLORIDE AND EPINEPHRINE 10; 10 MG/ML; UG/ML
10 INJECTION, SOLUTION INFILTRATION; PERINEURAL ONCE
Status: ACTIVE | OUTPATIENT
Start: 2023-10-10

## 2023-10-10 RX ORDER — LIDOCAINE HYDROCHLORIDE 10 MG/ML
10 INJECTION, SOLUTION INFILTRATION; PERINEURAL ONCE
Status: ACTIVE | OUTPATIENT
Start: 2023-10-10

## 2023-10-11 LAB
CYTO UR: NORMAL
LAB AP CASE REPORT: NORMAL
Lab: NORMAL
PATH REPORT.FINAL DX SPEC: NORMAL
PATH REPORT.GROSS SPEC: NORMAL

## 2023-10-11 NOTE — PROGRESS NOTES
Please call the patient and let her know that biopsy showed fat necrosis. No cancer. Follow up as scheduled.

## 2023-10-12 NOTE — PATIENT INSTRUCTIONS

## 2023-10-12 NOTE — PROGRESS NOTES
Notified the patient and let her know that biopsy showed fat necrosis. No cancer. She will keep follow up as scheduled. She voiced understanding.

## 2023-10-18 ENCOUNTER — OFFICE VISIT (OUTPATIENT)
Dept: SURGERY | Facility: CLINIC | Age: 83
End: 2023-10-18
Payer: MEDICARE

## 2023-10-18 ENCOUNTER — LAB (OUTPATIENT)
Dept: LAB | Facility: HOSPITAL | Age: 83
End: 2023-10-18
Payer: MEDICARE

## 2023-10-18 ENCOUNTER — OFFICE VISIT (OUTPATIENT)
Dept: ONCOLOGY | Facility: CLINIC | Age: 83
End: 2023-10-18
Payer: MEDICARE

## 2023-10-18 VITALS
OXYGEN SATURATION: 95 % | WEIGHT: 159 LBS | BODY MASS INDEX: 29.26 KG/M2 | HEIGHT: 62 IN | HEART RATE: 93 BPM | DIASTOLIC BLOOD PRESSURE: 76 MMHG | SYSTOLIC BLOOD PRESSURE: 136 MMHG

## 2023-10-18 VITALS
SYSTOLIC BLOOD PRESSURE: 124 MMHG | OXYGEN SATURATION: 96 % | BODY MASS INDEX: 29.21 KG/M2 | HEART RATE: 89 BPM | TEMPERATURE: 97.6 F | WEIGHT: 158.7 LBS | DIASTOLIC BLOOD PRESSURE: 82 MMHG | RESPIRATION RATE: 16 BRPM | HEIGHT: 62 IN

## 2023-10-18 DIAGNOSIS — D05.11 DUCTAL CARCINOMA IN SITU (DCIS) OF RIGHT BREAST: Primary | ICD-10-CM

## 2023-10-18 DIAGNOSIS — E66.3 OVERWEIGHT (BMI 25.0-29.9): ICD-10-CM

## 2023-10-18 DIAGNOSIS — N64.1 FAT NECROSIS (SEGMENTAL) OF BREAST: Primary | ICD-10-CM

## 2023-10-18 DIAGNOSIS — Z85.3 PERSONAL HISTORY OF MALIGNANT NEOPLASM OF BREAST: ICD-10-CM

## 2023-10-18 DIAGNOSIS — Z85.3 HISTORY OF BREAST CANCER: Primary | ICD-10-CM

## 2023-10-18 LAB
ALBUMIN SERPL-MCNC: 4.2 G/DL (ref 3.5–5.2)
ALBUMIN/GLOB SERPL: 1.7 G/DL
ALP SERPL-CCNC: 51 U/L (ref 39–117)
ALT SERPL W P-5'-P-CCNC: 15 U/L (ref 1–33)
ANION GAP SERPL CALCULATED.3IONS-SCNC: 9 MMOL/L (ref 5–15)
AST SERPL-CCNC: 25 U/L (ref 1–32)
BASOPHILS # BLD AUTO: 0.05 10*3/MM3 (ref 0–0.2)
BASOPHILS NFR BLD AUTO: 0.4 % (ref 0–1.5)
BILIRUB SERPL-MCNC: 0.4 MG/DL (ref 0–1.2)
BUN SERPL-MCNC: 15 MG/DL (ref 8–23)
BUN/CREAT SERPL: 21.4 (ref 7–25)
CALCIUM SPEC-SCNC: 9.8 MG/DL (ref 8.6–10.5)
CHLORIDE SERPL-SCNC: 102 MMOL/L (ref 98–107)
CO2 SERPL-SCNC: 26 MMOL/L (ref 22–29)
CREAT SERPL-MCNC: 0.7 MG/DL (ref 0.57–1)
DEPRECATED RDW RBC AUTO: 41.9 FL (ref 37–54)
EGFRCR SERPLBLD CKD-EPI 2021: 85.4 ML/MIN/1.73
EOSINOPHIL # BLD AUTO: 0.13 10*3/MM3 (ref 0–0.4)
EOSINOPHIL NFR BLD AUTO: 1.1 % (ref 0.3–6.2)
ERYTHROCYTE [DISTWIDTH] IN BLOOD BY AUTOMATED COUNT: 12 % (ref 12.3–15.4)
GLOBULIN UR ELPH-MCNC: 2.5 GM/DL
GLUCOSE SERPL-MCNC: 180 MG/DL (ref 65–99)
HCT VFR BLD AUTO: 41.6 % (ref 34–46.6)
HGB BLD-MCNC: 14.4 G/DL (ref 12–15.9)
HOLD SPECIMEN: NORMAL
HOLD SPECIMEN: NORMAL
IMM GRANULOCYTES # BLD AUTO: 0.05 10*3/MM3 (ref 0–0.05)
IMM GRANULOCYTES NFR BLD AUTO: 0.4 % (ref 0–0.5)
LYMPHOCYTES # BLD AUTO: 1.35 10*3/MM3 (ref 0.7–3.1)
LYMPHOCYTES NFR BLD AUTO: 11.3 % (ref 19.6–45.3)
MCH RBC QN AUTO: 32.6 PG (ref 26.6–33)
MCHC RBC AUTO-ENTMCNC: 34.6 G/DL (ref 31.5–35.7)
MCV RBC AUTO: 94.1 FL (ref 79–97)
MONOCYTES # BLD AUTO: 0.46 10*3/MM3 (ref 0.1–0.9)
MONOCYTES NFR BLD AUTO: 3.9 % (ref 5–12)
NEUTROPHILS NFR BLD AUTO: 82.9 % (ref 42.7–76)
NEUTROPHILS NFR BLD AUTO: 9.87 10*3/MM3 (ref 1.7–7)
NRBC BLD AUTO-RTO: 0 /100 WBC (ref 0–0.2)
PLATELET # BLD AUTO: 207 10*3/MM3 (ref 140–450)
PMV BLD AUTO: 9.4 FL (ref 6–12)
POTASSIUM SERPL-SCNC: 4.2 MMOL/L (ref 3.5–5.2)
PROT SERPL-MCNC: 6.7 G/DL (ref 6–8.5)
RBC # BLD AUTO: 4.42 10*6/MM3 (ref 3.77–5.28)
SODIUM SERPL-SCNC: 137 MMOL/L (ref 136–145)
WBC NRBC COR # BLD: 11.91 10*3/MM3 (ref 3.4–10.8)

## 2023-10-18 PROCEDURE — 36415 COLL VENOUS BLD VENIPUNCTURE: CPT

## 2023-10-18 PROCEDURE — 85025 COMPLETE CBC W/AUTO DIFF WBC: CPT

## 2023-10-18 PROCEDURE — 80053 COMPREHEN METABOLIC PANEL: CPT

## 2023-10-18 NOTE — PROGRESS NOTES
[facility]  HEMATOLOGY & ONCOLOGY    [facility] HEMATOLOGY AND ONCOLOGY  [address]  [place] [address] [phone]    Patient Name: Suzanna Jolly  Encounter Date: 10/18/2023  YOB: 1940  Patient Number: [id number]    REASON FOR VISIT: Ms. Jolly is a 83 y.o. patient here today in follow up for ductal carcinoma in situ of the right breast.  She was diagnosed in 2013 and underwent lumpectomy.  This was followed by radiation therapy and then tamoxifen for 5 years.      She did have to had to have a  lump removed from the incision site at the breast cancer site in September 2020.  US confirmed the lesion and therefore Dr Siu discussed it with the patient and they decided to complete another lumpectomy.  Pathology on 9/14/20 shows no malignancy just fat necrosis.     INTERVAL HISTORY  Pt presents to clinic today for continued follow up.  Mammogram and Ultrasound 10/3/23 showed  Persistence of an asymmetry in the retroareolar region of the right breast with corresponding ultrasound abnormality. These findings are  considered suspicious.  She had ultrasound guided biopsy per Dr. Swenson on 10/10/23 which again showed benign fat necrosis.  Radiologist recommended 6 month mammogram.        Labs today with WBC 5.9, Hgb 14.2, Hct 40.8, Plt 186.  Chemistry unremarkable     She has had cough for approx a year. Sees allergist.  Taking Pepcid and Symbicort but no improvement.        PAST MEDICAL HISTORY:  ALLERGIES:  Allergies   Allergen Reactions    Septra [Sulfamethoxazole-Trimethoprim] Hives and Nausea And Vomiting    Ceftin [Cefuroxime] Hives and GI Intolerance    Cefuroxime Axetil Hives and Nausea And Vomiting    Ciprofloxacin Hives, Nausea And Vomiting and Rash    Keflex [Cephalexin] Hives, Nausea And Vomiting and GI Intolerance    Hydrocodone Rash     Rash and flushed         CURRENT MEDICATIONS:  Outpatient  Encounter Medications as of 10/18/2023   Medication Sig Dispense Refill    aspirin 81 MG EC tablet Take 1 tablet by mouth Daily.      B Complex-C (SUPER B COMPLEX PO) Take 1 tablet by mouth Daily.      benzonatate (TESSALON) 100 MG capsule Take 1 capsule by mouth 3 (Three) Times a Day As Needed.      Calcium Carbonate (CALCIUM 500 PO) Take 500 mg by mouth Daily.      famotidine (PEPCID) 20 MG tablet Take 1 tablet by mouth At Night As Needed.      fluticasone (FLONASE) 50 MCG/ACT nasal spray       IPRATROPIUM BROMIDE NA 2 sprays into the nostril(s) as directed by provider Daily.      levocetirizine (XYZAL) 5 MG tablet Take 1 tablet by mouth Daily.      metFORMIN (GLUCOPHAGE) 500 MG tablet Take 1 tablet by mouth Daily With Breakfast.      montelukast (SINGULAIR) 10 MG tablet Take 1 tablet by mouth Daily.      Multiple Vitamins-Minerals (MULTIVITAMIN WITH MINERALS) tablet tablet Take 1 tablet by mouth Daily.      oxybutynin XL (DITROPAN-XL) 5 MG 24 hr tablet Take 1 tablet by mouth Daily.      rosuvastatin (CRESTOR) 10 MG tablet Take 1 tablet by mouth Daily.      Symbicort 160-4.5 MCG/ACT inhaler       triamterene-hydrochlorothiazide (DYAZIDE) 37.5-25 MG per capsule Take 1 capsule by mouth Daily.      ursodiol (ACTIGALL) 250 MG tablet Take 1 tablet by mouth Daily.  5    vitamin D3 125 MCG (5000 UT) capsule capsule Take 1 capsule by mouth Daily.      vitamin E 400 UNIT capsule Take 1 capsule by mouth Daily.       Facility-Administered Encounter Medications as of 10/18/2023   Medication Dose Route Frequency Provider Last Rate Last Admin    lidocaine (XYLOCAINE) 1 % injection 10 mL  10 mL Subcutaneous Once Pham Santiago MD        lidocaine 1% - EPINEPHrine 1:709520 (XYLOCAINE W/EPI) 1 %-1:355687 injection 10 mL  10 mL Injection Once Pham Santiago MD         ADULT ILLNESSES:  Patient Active Problem List   Diagnosis Code    Sepsis A41.9    Acute colitis K52.9    Nausea R11.0    Abnormal liver function R94.5     Ductal carcinoma in situ (DCIS) of right breast D05.11    Hypertension I10    Osteoporosis M81.0     SURGERIES:  Past Surgical History:   Procedure Laterality Date    BREAST BIOPSY Right 11/11/2013    cancer    BREAST BIOPSY Right 1972     with Benign findings    BREAST BIOPSY Right 2018    benign    BREAST BIOPSY Right 9/14/2020    Procedure: WIDE EXCISION RIGHT BREAST NODULE;  Surgeon: Melina Lan MD;  Location:  [facility] OR;  Service: General;  Laterality: Right;    BREAST LUMPECTOMY Right 12/03/2013    BREAST LUMPECTOMY      RIGHT    CHOLECYSTECTOMY      COLONOSCOPY  2010     Dr. Gardiner. facility used [facility]    ERCP AND ENDOSCOPY      FOOT SURGERY  09/2014     [facility]ri [facility]    MAMMO BILATERAL  08/19/2014    PAP SMEAR      SKIN BIOPSY Right 11/16/2013    FOOT AND LEG; BENIGN FINDINGS    TUBAL ABDOMINAL LIGATION       HEALTH MAINTENANCE ITEMS:    Last Completed Colonoscopy       November 2011 - negative          Immunization History   Administered Date(s) Administered    COVID-19 (MODERNA) 1st,2nd,3rd Dose Monovalent 02/12/2021, 03/12/2021    COVID-19 (MODERNA) BIVALENT 12+YRS 10/14/2022    COVID-19 (MODERNA) Monovalent Original Booster 10/30/2021, 05/05/2022     Last Completed Mammogram         Status Date      MAMMOGRAM Done 9/1/2021 Mammogram 9/1/2021 showed no sign of malignancy.                   FAMILY HISTORY:  Family History   Problem Relation Age of Onset    Stroke Brother     No Known Problems Daughter     No Known Problems Son     Other Brother         hx of many comorbidities    Down syndrome Brother     Stroke Mother     Pneumonia Father     Stroke Father     No Known Problems Sister     No Known Problems Maternal Grandmother     No Known Problems Paternal Grandmother     No Known Problems Maternal Aunt     No Known Problems Paternal Aunt     Breast cancer Neg Hx     BRCA 1/2 Neg Hx     Colon cancer Neg Hx     Endometrial cancer Neg Hx     Ovarian cancer Neg Hx      SOCIAL  "HISTORY:  Social History     Socioeconomic History    Marital status:    Tobacco Use    Smoking status: Never    Smokeless tobacco: Never   Substance and Sexual Activity    Alcohol use: No    Drug use: No    Sexual activity: Defer       REVIEW OF SYSTEMS:  Review of Systems   Constitutional:  Negative for activity change, appetite change, chills, diaphoresis, fatigue, fever and unexpected weight loss.   HENT:  Negative for ear pain, nosebleeds, sinus pressure, sore throat and voice change.    Eyes:  Negative for blurred vision, double vision, pain and visual disturbance.   Respiratory:  Negative for cough and shortness of breath.    Cardiovascular:  Negative for chest pain, palpitations and leg swelling.   Gastrointestinal:  Negative for abdominal pain, anal bleeding, blood in stool, constipation, diarrhea, nausea and vomiting.   Endocrine: Negative for heat intolerance, polydipsia and polyuria.   Genitourinary:  Negative for dysuria, frequency, hematuria, urgency and urinary incontinence.   Musculoskeletal:  Positive for arthralgias. Negative for myalgias.   Skin:  Negative for rash and skin lesions.   Neurological:  Negative for dizziness, tremors, seizures, syncope, speech difficulty, weakness and headache.   Hematological:  Negative for adenopathy. Does not bruise/bleed easily.   Psychiatric/Behavioral:  Negative for dysphoric mood, sleep disturbance, suicidal ideas and depressed mood.        /82   Pulse 89   Temp 97.6 °F (36.4 °C)   Resp 16   Ht 157.5 cm (62\")   Wt 72 kg (158 lb 11.2 oz)   LMP  (LMP Unknown)   SpO2 96%   BMI 29.03 kg/m²  Body surface area is 1.73 meters squared.  Pain Score    10/18/23 1419   PainSc: 0-No pain       Physical Exam  Vitals reviewed.   Constitutional:       Appearance: Normal appearance. She is well-developed.   HENT:      Head: Atraumatic.   Eyes:      General: No scleral icterus.     Pupils: Pupils are equal, round, and reactive to light.   Neck:      " Trachea: Trachea normal.   Cardiovascular:      Rate and Rhythm: Normal rate and regular rhythm.   Pulmonary:      Effort: Pulmonary effort is normal.      Breath sounds: Normal breath sounds. No wheezing, rhonchi or rales.   Chest:   Breasts:     Left: Normal.      Comments: Right breast with surgical change noted  Abdominal:      Palpations: Abdomen is soft.      Tenderness: There is no abdominal tenderness. There is no guarding or rebound.   Musculoskeletal:      Cervical back: Neck supple.      Comments: Intermittent ankle swelling. Pt states she has broken them and they swell since then   Lymphadenopathy:      Cervical: No cervical adenopathy.      Upper Body:      Right upper body: No supraclavicular adenopathy.      Left upper body: No supraclavicular adenopathy.   Skin:     General: Skin is warm and dry.   Neurological:      General: No focal deficit present.      Mental Status: She is alert and oriented to person, place, and time.   Psychiatric:         Mood and Affect: Mood normal.         Behavior: Behavior normal.         Suzanna ED LEON Shanae reports a pain score of 0.   Patient's Body mass index is 29.03 kg/m². BMI is above normal parameters. Recommendations include: defer to pcp .      LABS    Lab Results - Last 18 Months   Lab Units 10/18/23  1411 08/14/23  1021 02/14/23  1017 10/18/22  1040 08/11/22  0958   HEMOGLOBIN g/dL 14.4 14.7 14.5 14.2 14.5   HEMATOCRIT % 41.6 42.6 42.0 40.8 43.1   MCV fL 94.1 96.8 97.9 96.7 96.4   WBC 10*3/mm3 11.91* 7.6 6.8 5.90 8.5   RDW % 12.0* 11.7 11.9 11.7* 11.9   MPV fL 9.4 10.1 10.4 9.6 9.5   PLATELETS 10*3/mm3 207 212 217 186 218   IMM GRAN % % 0.4  --   --  0.3  --    NEUTROS ABS 10*3/mm3 9.87* 5.5 4.9 4.18 6.0   LYMPHS ABS 10*3/mm3 1.35 1.3 1.1 0.99 1.5   MONOS ABS 10*3/mm3 0.46 0.60 0.50 0.45 0.70   EOS ABS 10*3/mm3 0.13 0.20 0.20 0.21 0.20   BASOS ABS 10*3/mm3 0.05 0.10 0.10 0.05 0.10   IMMATURE GRANS (ABS) 10*3/mm3 0.05 0.0 0.0 0.02 0.0   NRBC /100 WBC 0.0  --   --   0.0  --        Lab Results - Last 18 Months   Lab Units 10/18/23  1411 10/18/22  1040 08/11/22  0958   GLUCOSE mg/dL 180* 127* 102   SODIUM mmol/L 137 138 137   POTASSIUM mmol/L 4.2 4.2 4.0   TOTAL CO2 mmol/L  --   --  29   CO2 mmol/L 26.0 28.0  --    CHLORIDE mmol/L 102 100 101   ANION GAP mmol/L 9.0 10.0 7   CREATININE mg/dL 0.70 0.76 0.7   BUN mg/dL 15 17 14   BUN / CREAT RATIO  21.4 22.4  --    CALCIUM mg/dL 9.8 9.5 9.2   EGFR IF NONAFRICN AM   --   --  >60   ALK PHOS U/L 51 51 50   TOTAL PROTEIN g/dL 6.7 6.8 6.6   ALT (SGPT) U/L 15 23 14   AST (SGOT) U/L 25 27 22   BILIRUBIN mg/dL 0.4 0.4 0.5   ALBUMIN g/dL 4.2 4.10 4.2   GLOBULIN gm/dL 2.5 2.7  --        ASSESSMENT  1. History of breast cancer        1. History of Right breast ductal carcinoma in situ diagnosed in 2013  Initial Stage: AJCC TNM pTisNXMX, stage 0  Treatment: Lumpectomy followed by adjuvant radiation therapy.  She then took TMX for 5 years which was completed in August 2019.  Right breast nodule at previous tumor bed site 9/2020, resected and negative. Path - fat necrosis  Mammogram and Ultrasound 10/3/23 showed  Persistence of an asymmetry in the retroareolar region of the right breast with corresponding ultrasound abnormality. These findings are considered suspicious.  She had ultrasound guided biopsy on 10/10/23 which again showed benign fat necrosis.  Radiologist recommended 6 month mammogram.      2.  Osteopenia  -BMD on 08/30/2021 with Lumbar spine T Score -1.2 and Left Hip T Score -1.0  -Taking calcium + D.   - Managed by PCP        PLAN  Stable for observation  Continue follow up with Dr. Lima Swenson   Continue current medications, treatment plans and follow up with PCP and any other providers  Return to office 1 year   Pre-office labs for CBC CMP  Care discussed with patient.  Understanding expressed.  Patient agreeable with plan.        Martha Saravia, APRN  10/18/2023

## 2023-10-18 NOTE — PROGRESS NOTES
Office Established Patient Note:     Referring Provider: No ref. provider found    Chief Complaint   Patient presents with    Follow-up       Subjective .     History of present illness:  Suzanna Jolly is a 83 y.o. female s/p right breast biopsy for a BIRADS 4 lesion. She tolerated the biopsy well. She denies any new medical issues since her last appointment.     History  Past Medical History:   Diagnosis Date    Anemia, unspecified     Arthritis     Biliary stones     Bone/cartilage disorder     Bronchitis     Chronic obstructive asthma with status asthmaticus     History of bone density study     DR. HONEYCUTT    Hx of radiation therapy 2013    right breast    Hypertension     Malignant neoplasm of upper-outer quadrant of right female breast 2013    RIGHT SIDED BREAST CANCER WITH LUMPECTOMY    Osteoporosis     PONV (postoperative nausea and vomiting)     Pure hypercholesterolemia     Type 2 diabetes mellitus without complications     Urinary incontinence    ,   Past Surgical History:   Procedure Laterality Date    BREAST BIOPSY Right 11/11/2013    cancer    BREAST BIOPSY Right 1972     with Benign findings    BREAST BIOPSY Right 2018    benign    BREAST BIOPSY Right 9/14/2020    Procedure: WIDE EXCISION RIGHT BREAST NODULE;  Surgeon: Melina Lan MD;  Location: [facility] OR;  Service: General;  Laterality: Right;    BREAST LUMPECTOMY Right 12/03/2013    BREAST LUMPECTOMY      RIGHT    CHOLECYSTECTOMY      COLONOSCOPY  2010     Dr. Gardiner. facility used [facility]    ERCP AND ENDOSCOPY      FOOT SURGERY  09/2014     Dr. Sherri S[facility] in [facility]    MAMMO BILATERAL  08/19/2014    PAP SMEAR      SKIN BIOPSY Right 11/16/2013    FOOT AND LEG; BENIGN FINDINGS    TUBAL ABDOMINAL LIGATION     ,   Family History   Problem Relation Age of Onset    Stroke Brother     No Known Problems Daughter     No Known Problems Son     Other Brother         hx of many comorbidities    Down syndrome Brother     Stroke Mother      Pneumonia Father     Stroke Father     No Known Problems Sister     No Known Problems Maternal Grandmother     No Known Problems Paternal Grandmother     No Known Problems Maternal Aunt     No Known Problems Paternal Aunt     Breast cancer Neg Hx     BRCA 1/2 Neg Hx     Colon cancer Neg Hx     Endometrial cancer Neg Hx     Ovarian cancer Neg Hx    ,   Social History     Tobacco Use    Smoking status: Never    Smokeless tobacco: Never   Substance Use Topics    Alcohol use: No    Drug use: No   , (Not in a hospital admission)   and Allergies:  Septra [sulfamethoxazole-trimethoprim], Ceftin [cefuroxime], Cefuroxime axetil, Ciprofloxacin, Keflex [cephalexin], and Hydrocodone    Current Outpatient Medications:     aspirin 81 MG EC tablet, Take 1 tablet by mouth Daily., Disp: , Rfl:     B Complex-C (SUPER B COMPLEX PO), Take 1 tablet by mouth Daily., Disp: , Rfl:     benzonatate (TESSALON) 100 MG capsule, Take 1 capsule by mouth 3 (Three) Times a Day As Needed., Disp: , Rfl:     Calcium Carbonate (CALCIUM 500 PO), Take 500 mg by mouth Daily., Disp: , Rfl:     famotidine (PEPCID) 20 MG tablet, Take 1 tablet by mouth At Night As Needed., Disp: , Rfl:     fluticasone (FLONASE) 50 MCG/ACT nasal spray, , Disp: , Rfl:     IPRATROPIUM BROMIDE NA, 2 sprays into the nostril(s) as directed by provider Daily., Disp: , Rfl:     levocetirizine (XYZAL) 5 MG tablet, Take 1 tablet by mouth Daily., Disp: , Rfl:     metFORMIN (GLUCOPHAGE) 500 MG tablet, Take 1 tablet by mouth Daily With Breakfast., Disp: , Rfl:     montelukast (SINGULAIR) 10 MG tablet, Take 1 tablet by mouth Daily., Disp: , Rfl:     Multiple Vitamins-Minerals (MULTIVITAMIN WITH MINERALS) tablet tablet, Take 1 tablet by mouth Daily., Disp: , Rfl:     oxybutynin XL (DITROPAN-XL) 5 MG 24 hr tablet, Take 1 tablet by mouth Daily., Disp: , Rfl:     rosuvastatin (CRESTOR) 10 MG tablet, Take 1 tablet by mouth Daily., Disp: , Rfl:     Symbicort 160-4.5 MCG/ACT inhaler, , Disp: ,  "Rfl:     triamterene-hydrochlorothiazide (DYAZIDE) 37.5-25 MG per capsule, Take 1 capsule by mouth Daily., Disp: , Rfl:     ursodiol (ACTIGALL) 250 MG tablet, Take 1 tablet by mouth Daily., Disp: , Rfl: 5    vitamin D3 125 MCG (5000 UT) capsule capsule, Take 1 capsule by mouth Daily., Disp: , Rfl:     vitamin E 400 UNIT capsule, Take 1 capsule by mouth Daily., Disp: , Rfl:     Current Facility-Administered Medications:     lidocaine (XYLOCAINE) 1 % injection 10 mL, 10 mL, Subcutaneous, Once, Pham Santiago MD    lidocaine 1% - EPINEPHrine 1:007178 (XYLOCAINE W/EPI) 1 %-1:589265 injection 10 mL, 10 mL, Injection, Once, Pham Santiago MD    Objective     Vital Signs   /76   Pulse 93   Ht 157.5 cm (62\")   Wt 72.1 kg (159 lb)   LMP  (LMP Unknown)   SpO2 95%   BMI 29.08 kg/m²      Physical Exam:  General appearance - alert, well appearing, and in no distress  Mental status - alert, oriented to person, place, and time  Eyes - pupils equal and reactive, extraocular eye movements intact  Neck - supple, no significant adenopathy  Chest - no tachypnea, retractions or cyanosis  Heart - normal rate and regular rhythm  Abdomen - soft, nontender, nondistended, no masses or organomegaly  Neurological - alert, oriented, normal speech, no focal findings or movement disorder noted    Results Review:     The following data was reviewed by: Lima Swenson MD on 10/18/2023:  US Guided Breast Biopsy With & Without Device initial (10/10/2023 10:38)   Mammo Post Device Placement Right (10/10/2023 10:48)   ADDENDUM: Pathology results from ultrasound-guided biopsy of 10/10/2023  are consistent with benign fat necrosis. Pathology concordant with  radiographic imaging. 6-month follow-up right diagnostic mammogram  recommended following benign right breast biopsy.  Tissue Pathology Exam (10/10/2023 10:33)   Right breast mass, 3:00 4 cm from nipple, core biopsies:  Fat necrosis.    Assessment & Plan       Diagnoses and " all orders for this visit:    1. Fat necrosis (segmental) of breast (Primary)  -     Mammo Diagnostic Digital Tomosynthesis Right With CAD; Future    2. Overweight (BMI 25.0-29.9)    3. Personal history of malignant neoplasm of breast    Ms. Jolly is an 83 year old female s/p right breast biopsy which shows fat necrosis. This is concordant with her imaging. I have ordered a right breast diagnostic mammogram for 6 months from now and she will follow up with me after the mammogram.     This is a chronic problem. I have reviewed the biopsy, post biopsy mammogram and pathology report above. I have ordered a diagnostic mammogram.       BMI is >= 25 and <30. (Overweight) The following options were offered after discussion;: weight loss educational material (shared in after visit summary)          Lima Swenson MD  10/19/23  20:22 CDT

## 2023-10-20 NOTE — PATIENT INSTRUCTIONS

## 2023-11-14 ENCOUNTER — TELEPHONE (OUTPATIENT)
Dept: ONCOLOGY | Facility: CLINIC | Age: 83
End: 2023-11-14
Payer: MEDICARE

## 2023-11-14 NOTE — TELEPHONE ENCOUNTER
Caller: Suzanna Jolly    Relationship: Self    Best call back number: [phone]    What is the best time to reach you: ANYTIME    Who are you requesting to speak with (clinical staff, provider,  specific staff member): CLINICAL     Do you know the name of the person who called: SUZANNA    What was the call regarding: PATIENT WOULD LIKE TO GO AHEAD AND GET THE CT SCAN DONE THAT RICKY RIVERA DISCUSSED ON HER 10/18/2023 VISIT.    PLEASE CALL PATIENT TO DISCUSS     Is it okay if the provider responds through MyChart:

## 2023-11-15 DIAGNOSIS — R05.3 PERSISTENT COUGH FOR 3 WEEKS OR LONGER: ICD-10-CM

## 2023-11-15 DIAGNOSIS — R05.3 CHRONIC COUGH: ICD-10-CM

## 2023-11-15 DIAGNOSIS — Z85.3 HISTORY OF BREAST CANCER: Primary | ICD-10-CM

## 2023-11-15 NOTE — TELEPHONE ENCOUNTER
Pt has been informed that CT order has been placed and scheduling will contact her with the appt.  She v/u

## 2023-12-05 ENCOUNTER — HOSPITAL ENCOUNTER (OUTPATIENT)
Dept: CT IMAGING | Facility: HOSPITAL | Age: 83
Discharge: HOME | End: 2023-12-05
Admitting: NURSE PRACTITIONER
Payer: MEDICARE

## 2023-12-05 DIAGNOSIS — R05.3 PERSISTENT COUGH FOR 3 WEEKS OR LONGER: ICD-10-CM

## 2023-12-05 DIAGNOSIS — Z85.3 HISTORY OF BREAST CANCER: ICD-10-CM

## 2023-12-05 LAB — CREAT BLDA-MCNC: 0.7 MG/DL (ref 0.6–1.3)

## 2023-12-05 PROCEDURE — 71260 CT THORAX DX C+: CPT

## 2023-12-05 PROCEDURE — 82565 ASSAY OF CREATININE: CPT

## 2023-12-05 PROCEDURE — 25510000001 IOPAMIDOL 61 % SOLUTION: Performed by: NURSE PRACTITIONER

## 2023-12-05 RX ADMIN — IOPAMIDOL 100 ML: 612 INJECTION, SOLUTION INTRAVENOUS at 14:26

## 2023-12-07 NOTE — PROGRESS NOTES
Please let her know there are no findings of cancer.  Send this result to her PCP.  They may want to send her to pulmonology.  Or we can refer her if she'd prefer.

## 2023-12-08 ENCOUNTER — TELEPHONE (OUTPATIENT)
Dept: ONCOLOGY | Facility: CLINIC | Age: 83
End: 2023-12-08
Payer: MEDICARE

## 2023-12-08 NOTE — TELEPHONE ENCOUNTER
PT HAS BEEN INFORMED OF CT RESULTS. SHE WILL F/U WITH ODESSA HONEYCUTT ON WHETHER OR NOT SHE WANTS THE PULMONOLOGY REFERRAL.

## 2023-12-08 NOTE — TELEPHONE ENCOUNTER
----- Message from SOURAV Arteaga sent at 12/7/2023 12:07 PM CST -----  Please let her know there are no findings of cancer.  Send this result to her PCP.  They may want to send her to pulmonology.  Or we can refer her if she'd prefer.

## 2024-03-21 ENCOUNTER — OFFICE VISIT (OUTPATIENT)
Dept: OBGYN CLINIC | Age: 84
End: 2024-03-21
Payer: MEDICARE

## 2024-03-21 VITALS
HEART RATE: 85 BPM | DIASTOLIC BLOOD PRESSURE: 84 MMHG | SYSTOLIC BLOOD PRESSURE: 151 MMHG | WEIGHT: 157 LBS | BODY MASS INDEX: 27.81 KG/M2

## 2024-03-21 DIAGNOSIS — Z78.0 POSTMENOPAUSAL: ICD-10-CM

## 2024-03-21 DIAGNOSIS — N95.2 ATROPHIC VAGINITIS: Primary | ICD-10-CM

## 2024-03-21 DIAGNOSIS — N39.45 CONTINUOUS LEAKAGE OF URINE: ICD-10-CM

## 2024-03-21 PROCEDURE — 1036F TOBACCO NON-USER: CPT | Performed by: NURSE PRACTITIONER

## 2024-03-21 PROCEDURE — G8399 PT W/DXA RESULTS DOCUMENT: HCPCS | Performed by: NURSE PRACTITIONER

## 2024-03-21 PROCEDURE — G8427 DOCREV CUR MEDS BY ELIG CLIN: HCPCS | Performed by: NURSE PRACTITIONER

## 2024-03-21 PROCEDURE — 1123F ACP DISCUSS/DSCN MKR DOCD: CPT | Performed by: NURSE PRACTITIONER

## 2024-03-21 PROCEDURE — G8419 CALC BMI OUT NRM PARAM NOF/U: HCPCS | Performed by: NURSE PRACTITIONER

## 2024-03-21 PROCEDURE — 99213 OFFICE O/P EST LOW 20 MIN: CPT | Performed by: NURSE PRACTITIONER

## 2024-03-21 PROCEDURE — G8484 FLU IMMUNIZE NO ADMIN: HCPCS | Performed by: NURSE PRACTITIONER

## 2024-03-21 PROCEDURE — 1090F PRES/ABSN URINE INCON ASSESS: CPT | Performed by: NURSE PRACTITIONER

## 2024-03-21 PROCEDURE — 0509F URINE INCON PLAN DOCD: CPT | Performed by: NURSE PRACTITIONER

## 2024-03-21 RX ORDER — OXYBUTYNIN CHLORIDE 5 MG/1
TABLET, EXTENDED RELEASE ORAL
Qty: 90 TABLET | Refills: 3 | Status: CANCELLED | OUTPATIENT
Start: 2024-03-21

## 2024-03-21 ASSESSMENT — ENCOUNTER SYMPTOMS
CONSTIPATION: 0
DIARRHEA: 0
RESPIRATORY NEGATIVE: 1
EYES NEGATIVE: 1
ALLERGIC/IMMUNOLOGIC NEGATIVE: 1
GASTROINTESTINAL NEGATIVE: 1

## 2024-03-21 NOTE — PROGRESS NOTES
Pt is here for medication refill on her oxybutynin. She states the oxybutynin helps some but not a lot. She is wanting to discuss BMD.  
Postmenopausal  DEXA BONE DENSITY 2 SITES      3. Continuous leakage of urine            MEDICATIONS:  No orders of the defined types were placed in this encounter.      ORDERS:  Orders Placed This Encounter   Procedures    DEXA BONE DENSITY 2 SITES       PLAN:  Discussed OAB and treatment options  Switching from Oxybutynin to Myrbetriq 50mg- gave samples and can send to pharmacy if working well  Ordered DEXA and discussed calcium and Vit D, encouraged walking    There are no Patient Instructions on file for this visit.

## 2024-03-27 ENCOUNTER — HOSPITAL ENCOUNTER (OUTPATIENT)
Dept: WOMENS IMAGING | Age: 84
Discharge: HOME | End: 2024-03-27
Payer: MEDICARE

## 2024-03-27 DIAGNOSIS — Z78.0 POSTMENOPAUSAL: ICD-10-CM

## 2024-03-27 PROCEDURE — 77080 DXA BONE DENSITY AXIAL: CPT

## 2024-04-15 ENCOUNTER — HOSPITAL ENCOUNTER (OUTPATIENT)
Dept: MAMMOGRAPHY | Facility: HOSPITAL | Age: 84
Discharge: HOME | End: 2024-04-15
Admitting: STUDENT IN AN ORGANIZED HEALTH CARE EDUCATION/TRAINING PROGRAM
Payer: MEDICARE

## 2024-04-15 DIAGNOSIS — N64.1 FAT NECROSIS (SEGMENTAL) OF BREAST: ICD-10-CM

## 2024-04-15 PROCEDURE — 77065 DX MAMMO INCL CAD UNI: CPT

## 2024-04-15 PROCEDURE — G0279 TOMOSYNTHESIS, MAMMO: HCPCS

## 2024-04-17 ENCOUNTER — OFFICE VISIT (OUTPATIENT)
Dept: SURGERY | Facility: CLINIC | Age: 84
End: 2024-04-17
Payer: MEDICARE

## 2024-04-17 VITALS
HEIGHT: 62 IN | BODY MASS INDEX: 29.08 KG/M2 | WEIGHT: 158 LBS | SYSTOLIC BLOOD PRESSURE: 140 MMHG | OXYGEN SATURATION: 96 % | DIASTOLIC BLOOD PRESSURE: 88 MMHG | HEART RATE: 97 BPM

## 2024-04-17 DIAGNOSIS — R92.8 ABNORMALITY OF RIGHT BREAST ON SCREENING MAMMOGRAM: Primary | ICD-10-CM

## 2024-04-17 DIAGNOSIS — Z85.3 PERSONAL HISTORY OF BREAST CANCER: ICD-10-CM

## 2024-04-17 DIAGNOSIS — E66.3 OVERWEIGHT WITH BODY MASS INDEX (BMI) OF 28 TO 28.9 IN ADULT: ICD-10-CM

## 2024-04-17 RX ORDER — FLUTICASONE FUROATE, UMECLIDINIUM BROMIDE AND VILANTEROL TRIFENATATE 100; 62.5; 25 UG/1; UG/1; UG/1
1 POWDER RESPIRATORY (INHALATION)
COMMUNITY

## 2024-04-17 NOTE — PROGRESS NOTES
Office Established Patient Note:     Referring Provider: No ref. provider found    No chief complaint on file.      Subjective .     History of present illness:  Suzanna Jolly is a 83 y.o. female who presents for breast follow up. She denies any new masses, nipple discharge or skin changes. She has a history of right breast cancer s/p lumpectomy and radiation and Tamoxifen x 5 years. She has had several subsequent right breast biopsies for abnormal mammograms.     History  Past Medical History:   Diagnosis Date    Anemia, unspecified     Arthritis     Biliary stones     Bone/cartilage disorder     Bronchitis     Chronic obstructive asthma with status asthmaticus     History of bone density study     DR. HONEYCUTT    Hx of radiation therapy 2013    right breast    Hypertension     Malignant neoplasm of upper-outer quadrant of right female breast 2013    RIGHT SIDED BREAST CANCER WITH LUMPECTOMY    Osteoporosis     PONV (postoperative nausea and vomiting)     Pure hypercholesterolemia     Type 2 diabetes mellitus without complications     Urinary incontinence    ,   Past Surgical History:   Procedure Laterality Date    BREAST BIOPSY Right 11/11/2013    cancer    BREAST BIOPSY Right 1972     with Benign findings    BREAST BIOPSY Right 2018    benign    BREAST BIOPSY Right 9/14/2020    Procedure: WIDE EXCISION RIGHT BREAST NODULE;  Surgeon: Melina Lan MD;  Location: [facility] OR;  Service: General;  Laterality: Right;    BREAST LUMPECTOMY Right 12/03/2013    BREAST LUMPECTOMY      RIGHT    CHOLECYSTECTOMY      COLONOSCOPY  2010     Dr. Gardiner. facility used [facility]    ERCP AND ENDOSCOPY      FOOT SURGERY  09/2014     Dr. Sherri Saha in [facility]    MAMMO BILATERAL  08/19/2014    PAP SMEAR      SKIN BIOPSY Right 11/16/2013    FOOT AND LEG; BENIGN FINDINGS    TUBAL ABDOMINAL LIGATION     ,   Family History   Problem Relation Age of Onset    Stroke Brother     No Known Problems Daughter     No Known Problems Son      Other Brother         hx of many comorbidities    Down syndrome Brother     Stroke Mother     Pneumonia Father     Stroke Father     No Known Problems Sister     No Known Problems Maternal Grandmother     No Known Problems Paternal Grandmother     No Known Problems Maternal Aunt     No Known Problems Paternal Aunt     Breast cancer Neg Hx     BRCA 1/2 Neg Hx     Colon cancer Neg Hx     Endometrial cancer Neg Hx     Ovarian cancer Neg Hx    ,   Social History     Tobacco Use    Smoking status: Never    Smokeless tobacco: Never   Substance Use Topics    Alcohol use: No    Drug use: No   , (Not in a hospital admission)   and Allergies:  Septra [sulfamethoxazole-trimethoprim], Ceftin [cefuroxime], Cefuroxime axetil, Ciprofloxacin, Keflex [cephalexin], and Hydrocodone    Current Outpatient Medications:     aspirin 81 MG EC tablet, Take 1 tablet by mouth Daily., Disp: , Rfl:     B Complex-C (SUPER B COMPLEX PO), Take 1 tablet by mouth Daily., Disp: , Rfl:     Calcium Carbonate (CALCIUM 500 PO), Take 500 mg by mouth Daily., Disp: , Rfl:     famotidine (PEPCID) 20 MG tablet, Take 1 tablet by mouth At Night As Needed., Disp: , Rfl:     fluticasone (FLONASE) 50 MCG/ACT nasal spray, , Disp: , Rfl:     Fluticasone-Umeclidin-Vilant (Trelegy Ellipta) 100-62.5-25 MCG/ACT inhaler, Inhale 1 puff Daily., Disp: , Rfl:     IPRATROPIUM BROMIDE NA, 2 sprays into the nostril(s) as directed by provider Daily., Disp: , Rfl:     levocetirizine (XYZAL) 5 MG tablet, Take 1 tablet by mouth Daily., Disp: , Rfl:     metFORMIN (GLUCOPHAGE) 500 MG tablet, Take 1 tablet by mouth Daily With Breakfast., Disp: , Rfl:     montelukast (SINGULAIR) 10 MG tablet, Take 1 tablet by mouth Daily., Disp: , Rfl:     Multiple Vitamins-Minerals (MULTIVITAMIN WITH MINERALS) tablet tablet, Take 1 tablet by mouth Daily., Disp: , Rfl:     rosuvastatin (CRESTOR) 10 MG tablet, Take 1 tablet by mouth Daily., Disp: , Rfl:     triamterene-hydrochlorothiazide (DYAZIDE)  "37.5-25 MG per capsule, Take 1 capsule by mouth Daily., Disp: , Rfl:     ursodiol (ACTIGALL) 250 MG tablet, Take 1 tablet by mouth Daily., Disp: , Rfl: 5    vitamin D3 125 MCG (5000 UT) capsule capsule, Take 1 capsule by mouth Daily., Disp: , Rfl:     vitamin E 400 UNIT capsule, Take 1 capsule by mouth Daily., Disp: , Rfl:     benzonatate (TESSALON) 100 MG capsule, Take 1 capsule by mouth 3 (Three) Times a Day As Needed. (Patient not taking: Reported on 4/17/2024), Disp: , Rfl:     oxybutynin XL (DITROPAN-XL) 5 MG 24 hr tablet, Take 1 tablet by mouth Daily. (Patient not taking: Reported on 4/17/2024), Disp: , Rfl:     Symbicort 160-4.5 MCG/ACT inhaler, , Disp: , Rfl:     Current Facility-Administered Medications:     lidocaine (XYLOCAINE) 1 % injection 10 mL, 10 mL, Subcutaneous, Once, Pham Santiago MD    lidocaine 1% - EPINEPHrine 1:013042 (XYLOCAINE W/EPI) 1 %-1:934834 injection 10 mL, 10 mL, Injection, Once, Pham Santiago MD      Objective     Vital Signs   /88   Pulse 97   Ht 157.5 cm (62\")   Wt 71.7 kg (158 lb)   LMP  (LMP Unknown)   SpO2 96%   BMI 28.90 kg/m²      Physical Exam:  General appearance - alert, well appearing, and in no distress  Mental status - alert, oriented to person, place, and time  Eyes - pupils equal and reactive, extraocular eye movements intact  Neck - supple, no significant adenopathy  Chest - no tachypnea, retractions or cyanosis  Heart - normal rate and regular rhythm  Abdomen - soft, nontender, nondistended, no masses or organomegaly  Neurological - alert, oriented, normal speech, no focal findings or movement disorder noted  Breast Exam:  Bilateral nipples everted. Patient examined in the supine position with the ipsilateral arm above the head. In the upper outer quadrant of the right breast there is a deep indentation at her prior incision.  No other palpable masses bilaterally. No nipple discharge bilaterally. No palpable axillary nor supraclavicular " adenopathy bilaterally.       Results Review:    The following data was reviewed by: Lima Swenson MD on 04/17/2024:   Mammo Diagnostic Digital Tomosynthesis Right With CAD (04/15/2024 13:48)   1. Apparent increase in volume of probable scar tissue at the original  lumpectomy site in the upper outer quadrant of the right breast when  compared with mammograms from September 2022. Based on the mammographic  appearance, it is not possible to determine if this is only related to  progressive fibrosis or recurrent neoplasm, consider follow-up with MRI  of the breasts with and without contrast. BI-RADS Category 3, probably  benign.    Assessment & Plan       Diagnoses and all orders for this visit:    1. Abnormality of right breast on screening mammogram (Primary)  -     MRI Breast Bilateral Diagnostic W WO Contrast; Future    2. Personal history of breast cancer    3. Overweight with body mass index (BMI) of 28 to 28.9 in adult       Ms. Jolly is an 83 year old female with a history of right breast cancer s/p lumpectomy and radiation and Tamoxifen x 5 years.She has had multiple right breast biopsies since then. Her most recent mammogram shows increasing calcifications at the lumpectomy site for which an MRI is recommended. She is in agreement with this plan.   Breast MRI ordered. I will call her with the results and set up follow up at that time.     This is a new undiagnosed new problem with an uncertain prognosis. I have reviewed the mammo above and ordered an MRI.       Lima Swenson MD  04/18/24  15:00 CDT

## 2024-04-18 NOTE — PATIENT INSTRUCTIONS

## 2024-04-23 ENCOUNTER — TELEPHONE (OUTPATIENT)
Dept: SURGERY | Facility: CLINIC | Age: 84
End: 2024-04-23
Payer: MEDICARE

## 2024-04-23 ENCOUNTER — TELEPHONE (OUTPATIENT)
Dept: SURGERY | Facility: CLINIC | Age: 84
End: 2024-04-23

## 2024-04-23 NOTE — TELEPHONE ENCOUNTER
patient called wanting Dr Swenson to give Valium before her mri she stated Dr Swenson said if she needed this she would call in also she had questions about mri and eating and stopping medications. Informed her about all instructions she voiced understanding.     Sent message to Dr Swenson medical Assistant to ask abut Valium

## 2024-04-23 NOTE — TELEPHONE ENCOUNTER
Caller: MARISSA HANLEY    Relationship: SELF    Best call back number: [phone] (home)       Requested Prescriptions: VALIUM    Pharmacy where request should be sent:    [facility]  [address]  [place] [address]     Last office visit with prescribing clinician: 4/17/2024   Last telemedicine visit with prescribing clinician: Visit date not found   Next office visit with prescribing clinician: Visit date not found     Additional details provided by patient: REQUESTING THIS FOR MY FUTURE MRI BREAST 5.2.24    Does the patient have less than a 3 day supply:  [x] Yes  [] No    Would you like a call back once the refill request has been completed: [x] Yes [] No    If the office needs to give you a call back, can they leave a voicemail: [x] Yes [] No    Ana Cristina Neely Rep   04/23/24 15:04 CDT     PT IS NEEDING: PLEASE HAVE DR ARELLANO CALL ME I HAVE QUESTIONS REGARDING MRI INSTRUCTIONS. WHEN TO STOP MEDICATION; I'M DIABETIC. WHEN TO STOP EATING. ETC.

## 2024-04-29 DIAGNOSIS — D05.11 DUCTAL CARCINOMA IN SITU (DCIS) OF RIGHT BREAST: Primary | ICD-10-CM

## 2024-04-29 RX ORDER — DIAZEPAM 2 MG/1
2 TABLET ORAL ONCE AS NEEDED
Qty: 1 TABLET | Refills: 0 | Status: SHIPPED | OUTPATIENT
Start: 2024-04-29

## 2024-05-02 ENCOUNTER — HOSPITAL ENCOUNTER (OUTPATIENT)
Dept: MRI IMAGING | Facility: HOSPITAL | Age: 84
Discharge: HOME | End: 2024-05-02
Admitting: STUDENT IN AN ORGANIZED HEALTH CARE EDUCATION/TRAINING PROGRAM
Payer: MEDICARE

## 2024-05-02 DIAGNOSIS — R92.8 ABNORMALITY OF RIGHT BREAST ON SCREENING MAMMOGRAM: ICD-10-CM

## 2024-05-02 LAB — CREAT BLDA-MCNC: 0.9 MG/DL (ref 0.6–1.3)

## 2024-05-02 PROCEDURE — 82565 ASSAY OF CREATININE: CPT

## 2024-05-02 PROCEDURE — C8908 MRI W/O FOL W/CONT, BREAST,: HCPCS

## 2024-05-02 PROCEDURE — A9577 INJ MULTIHANCE: HCPCS | Performed by: STUDENT IN AN ORGANIZED HEALTH CARE EDUCATION/TRAINING PROGRAM

## 2024-05-02 PROCEDURE — 0 GADOBENATE DIMEGLUMINE 529 MG/ML SOLUTION: Performed by: STUDENT IN AN ORGANIZED HEALTH CARE EDUCATION/TRAINING PROGRAM

## 2024-05-02 PROCEDURE — C8937 CAD BREAST MRI: HCPCS

## 2024-05-02 RX ADMIN — GADOBENATE DIMEGLUMINE 14 ML: 529 INJECTION, SOLUTION INTRAVENOUS at 09:33

## 2024-05-06 ENCOUNTER — HOSPITAL ENCOUNTER (OUTPATIENT)
Dept: ULTRASOUND IMAGING | Facility: HOSPITAL | Age: 84
Discharge: HOME | End: 2024-05-06
Admitting: STUDENT IN AN ORGANIZED HEALTH CARE EDUCATION/TRAINING PROGRAM
Payer: MEDICARE

## 2024-05-06 DIAGNOSIS — R92.8 ABNORMAL MRI, BREAST: ICD-10-CM

## 2024-05-06 PROCEDURE — 76642 ULTRASOUND BREAST LIMITED: CPT

## 2024-05-13 ENCOUNTER — OFFICE VISIT (OUTPATIENT)
Dept: SURGERY | Facility: CLINIC | Age: 84
End: 2024-05-13
Payer: MEDICARE

## 2024-05-13 VITALS
OXYGEN SATURATION: 97 % | BODY MASS INDEX: 29.08 KG/M2 | SYSTOLIC BLOOD PRESSURE: 142 MMHG | HEART RATE: 102 BPM | HEIGHT: 62 IN | DIASTOLIC BLOOD PRESSURE: 82 MMHG | WEIGHT: 158 LBS

## 2024-05-13 DIAGNOSIS — R92.8 ABNORMALITY OF RIGHT BREAST ON SCREENING MAMMOGRAM: ICD-10-CM

## 2024-05-13 DIAGNOSIS — E66.3 OVERWEIGHT WITH BODY MASS INDEX (BMI) OF 28 TO 28.9 IN ADULT: ICD-10-CM

## 2024-05-13 DIAGNOSIS — D05.11 DUCTAL CARCINOMA IN SITU (DCIS) OF RIGHT BREAST: Primary | ICD-10-CM

## 2024-05-13 PROCEDURE — 99213 OFFICE O/P EST LOW 20 MIN: CPT | Performed by: STUDENT IN AN ORGANIZED HEALTH CARE EDUCATION/TRAINING PROGRAM

## 2024-05-13 PROCEDURE — 3077F SYST BP >= 140 MM HG: CPT | Performed by: STUDENT IN AN ORGANIZED HEALTH CARE EDUCATION/TRAINING PROGRAM

## 2024-05-13 PROCEDURE — 3079F DIAST BP 80-89 MM HG: CPT | Performed by: STUDENT IN AN ORGANIZED HEALTH CARE EDUCATION/TRAINING PROGRAM

## 2024-05-13 PROCEDURE — 1160F RVW MEDS BY RX/DR IN RCRD: CPT | Performed by: STUDENT IN AN ORGANIZED HEALTH CARE EDUCATION/TRAINING PROGRAM

## 2024-05-13 PROCEDURE — 1159F MED LIST DOCD IN RCRD: CPT | Performed by: STUDENT IN AN ORGANIZED HEALTH CARE EDUCATION/TRAINING PROGRAM

## 2024-05-13 NOTE — PROGRESS NOTES
Office Established Patient Note:     Referring Provider: No ref. provider found    Chief Complaint   Patient presents with    Abnormal Breast Imaging       Subjective .     History of present illness:  Suzanna Jolly is a 83 y.o. female who presents to review her breast imaging with her son who is also a general surgeon.She denies any new palpable masses, skin changes or nipple discharge.     She has a history of right breast cancer s/p lumpectomy and radiation and Tamoxifen x 5 years. She has had several subsequent right breast biopsies for abnormal mammograms.     History  Past Medical History:   Diagnosis Date    Anemia, unspecified     Arthritis     Biliary stones     Bone/cartilage disorder     Bronchitis     Chronic obstructive asthma with status asthmaticus     History of bone density study     DR. HONEYCUTT    Hx of radiation therapy 2013    right breast    Hypertension     Malignant neoplasm of upper-outer quadrant of right female breast 2013    RIGHT SIDED BREAST CANCER WITH LUMPECTOMY    Osteoporosis     PONV (postoperative nausea and vomiting)     Pure hypercholesterolemia     Type 2 diabetes mellitus without complications     Urinary incontinence    ,   Past Surgical History:   Procedure Laterality Date    BREAST BIOPSY Right 11/11/2013    cancer    BREAST BIOPSY Right 1972     with Benign findings    BREAST BIOPSY Right 2018    benign    BREAST BIOPSY Right 9/14/2020    Procedure: WIDE EXCISION RIGHT BREAST NODULE;  Surgeon: Melina Lan MD;  Location: [facility];  Service: General;  Laterality: Right;    BREAST LUMPECTOMY Right 12/03/2013    BREAST LUMPECTOMY      RIGHT    CHOLECYSTECTOMY      COLONOSCOPY  2010     Dr. Gardiner. facility used [facility]    ERCP AND ENDOSCOPY      FOOT SURGERY  09/2014     Dr. Sherri Saha in [facility]    MAMMO BILATERAL  08/19/2014    PAP SMEAR      SKIN BIOPSY Right 11/16/2013    FOOT AND LEG; BENIGN FINDINGS    TUBAL ABDOMINAL LIGATION     ,   Family History    Problem Relation Age of Onset    Stroke Brother     No Known Problems Daughter     No Known Problems Son     Other Brother         hx of many comorbidities    Down syndrome Brother     Stroke Mother     Pneumonia Father     Stroke Father     No Known Problems Sister     No Known Problems Maternal Grandmother     No Known Problems Paternal Grandmother     No Known Problems Maternal Aunt     No Known Problems Paternal Aunt     Breast cancer Neg Hx     BRCA 1/2 Neg Hx     Colon cancer Neg Hx     Endometrial cancer Neg Hx     Ovarian cancer Neg Hx    ,   Social History     Tobacco Use    Smoking status: Never     Passive exposure: Never    Smokeless tobacco: Never   Vaping Use    Vaping status: Never Used   Substance Use Topics    Alcohol use: No    Drug use: No   , (Not in a hospital admission)   and Allergies:  Septra [sulfamethoxazole-trimethoprim], Ceftin [cefuroxime], Cefuroxime axetil, Ciprofloxacin, Keflex [cephalexin], and Hydrocodone    Current Outpatient Medications:     aspirin 81 MG EC tablet, Take 1 tablet by mouth Daily., Disp: , Rfl:     B Complex-C (SUPER B COMPLEX PO), Take 1 tablet by mouth Daily., Disp: , Rfl:     benzonatate (TESSALON) 100 MG capsule, Take 1 capsule by mouth 3 (Three) Times a Day As Needed., Disp: , Rfl:     Calcium Carbonate (CALCIUM 500 PO), Take 500 mg by mouth Daily., Disp: , Rfl:     diazePAM (Valium) 2 MG tablet, Take 1 tablet by mouth 1 (One) Time As Needed for Anxiety (before mri) for up to 1 dose., Disp: 1 tablet, Rfl: 0    famotidine (PEPCID) 20 MG tablet, Take 1 tablet by mouth At Night As Needed., Disp: , Rfl:     fluticasone (FLONASE) 50 MCG/ACT nasal spray, , Disp: , Rfl:     Fluticasone-Umeclidin-Vilant (Trelegy Ellipta) 100-62.5-25 MCG/ACT inhaler, Inhale 1 puff Daily., Disp: , Rfl:     IPRATROPIUM BROMIDE NA, 2 sprays into the nostril(s) as directed by provider Daily., Disp: , Rfl:     levocetirizine (XYZAL) 5 MG tablet, Take 1 tablet by mouth Daily., Disp: , Rfl:  "    metFORMIN (GLUCOPHAGE) 500 MG tablet, Take 1 tablet by mouth Daily With Breakfast., Disp: , Rfl:     montelukast (SINGULAIR) 10 MG tablet, Take 1 tablet by mouth Daily., Disp: , Rfl:     Multiple Vitamins-Minerals (MULTIVITAMIN WITH MINERALS) tablet tablet, Take 1 tablet by mouth Daily., Disp: , Rfl:     oxybutynin XL (DITROPAN-XL) 5 MG 24 hr tablet, Take 1 tablet by mouth Daily., Disp: , Rfl:     rosuvastatin (CRESTOR) 10 MG tablet, Take 1 tablet by mouth Daily., Disp: , Rfl:     Symbicort 160-4.5 MCG/ACT inhaler, , Disp: , Rfl:     triamterene-hydrochlorothiazide (DYAZIDE) 37.5-25 MG per capsule, Take 1 capsule by mouth Daily., Disp: , Rfl:     ursodiol (ACTIGALL) 250 MG tablet, Take 1 tablet by mouth Daily., Disp: , Rfl: 5    vitamin D3 125 MCG (5000 UT) capsule capsule, Take 1 capsule by mouth Daily., Disp: , Rfl:     vitamin E 400 UNIT capsule, Take 1 capsule by mouth Daily., Disp: , Rfl:     Current Facility-Administered Medications:     lidocaine (XYLOCAINE) 1 % injection 10 mL, 10 mL, Subcutaneous, Once, Pham Santiago MD    lidocaine 1% - EPINEPHrine 1:476524 (XYLOCAINE W/EPI) 1 %-1:652736 injection 10 mL, 10 mL, Injection, Once, Pham Santiago MD    Objective     Vital Signs   /82   Pulse 102   Ht 157.5 cm (62\")   Wt 71.7 kg (158 lb)   LMP  (LMP Unknown)   SpO2 97%   BMI 28.90 kg/m²      Physical Exam:  General appearance - alert, well appearing, and in no distress  Mental status - alert, oriented to person, place, and time  Eyes - pupils equal and reactive, extraocular eye movements intact  Neck - supple, no significant adenopathy  Chest - no tachypnea, retractions or cyanosis  Heart - normal rate and regular rhythm  Abdomen - soft, nontender, nondistended, no masses or organomegaly  Neurological - alert, oriented, normal speech, no focal findings or movement disorder noted  Breast Exam:  Bilateral nipples everted. Patient examined in the supine position with the ipsilateral arm " above the head. In the upper outer quadrant of the right breast there is a deep indentation at her prior incision.  No other palpable masses bilaterally. No nipple discharge bilaterally. No palpable axillary nor supraclavicular adenopathy bilaterally.     Results Review:     The following data was reviewed by: Lima Swenson MD on 05/13/2024:  MRI Breast Bilateral Diagnostic W WO Contrast (05/02/2024 09:33)   1. RIGHT breast lumpectomy bed with some peripheral enhancement.  Difficult to determine if this could be fat necrosis or disease.  Recommend second look ultrasound with attention to the inferior margin,  which had the greatest confluent area of enhancement.  2. RIGHT breast enhancement anteriorly with adjacent biopsy marker,  suggestive of fat necrosis.  3. No MRI evidence of malignancy in the LEFT breast.  4. No adenopathy.     BI-RADS CATEGORY 4: Suspicious abnormality--biopsy should be considered.  Management Recommendation: Biopsy should be performed in the absence of  clinical contraindication.  US Breast Right Limited (05/06/2024 09:07)   RIGHT breast with finding favored to correlate with enhancement on prior  MRI at the periphery of the lumpectomy bed. Recommend ultrasound-guided  biopsy.       Assessment & Plan       Diagnoses and all orders for this visit:    1. 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 Abnormality of right breast on screening mammogram  -     US Breast Right Limited; Future    3. Overweight with body mass index (BMI) of 28 to 28.9 in adult       Ms. Jolly is an 83 year old female with a history of right breast cancer s/p lumpectomy and radiation and Tamoxifen x 5 years.She has had multiple right breast biopsies since then.  Mammogram showed increasing calcifications at the lumpectomy bed. MRI showed some peripheral enhancement in the lumpectomy bed. US showed an oval hypoechoic tissue area at the inferior aspect of the lumpectomy cavity. A biopsy was  recommended. She is hesitant to proceed with another biopsy as she has had so many in the past. She understands that the risk of forgoing biopsy is missing a cancer or pre-invasive lesion. She would like to proceed with short interval imaging and her son is in agreement. Right breast US in 3 months. Follow up with me after.         Lima Swenson MD  05/16/24  16:43 CDT

## 2024-05-16 NOTE — PATIENT INSTRUCTIONS

## 2024-06-03 ENCOUNTER — TRANSCRIBE ORDERS (OUTPATIENT)
Dept: ADMINISTRATIVE | Facility: HOSPITAL | Age: 84
End: 2024-06-03
Payer: MEDICARE

## 2024-06-10 ENCOUNTER — HOSPITAL ENCOUNTER (OUTPATIENT)
Dept: GENERAL RADIOLOGY | Age: 84
Discharge: HOME | End: 2024-06-10
Payer: MEDICARE

## 2024-06-10 DIAGNOSIS — M79.672 PAIN IN LEFT FOOT: ICD-10-CM

## 2024-06-10 DIAGNOSIS — M25.572 LEFT ANKLE PAIN, UNSPECIFIED CHRONICITY: ICD-10-CM

## 2024-06-10 PROCEDURE — 73610 X-RAY EXAM OF ANKLE: CPT

## 2024-06-10 PROCEDURE — 73630 X-RAY EXAM OF FOOT: CPT

## 2024-08-12 ENCOUNTER — HOSPITAL ENCOUNTER (OUTPATIENT)
Dept: ULTRASOUND IMAGING | Facility: HOSPITAL | Age: 84
Discharge: HOME | End: 2024-08-12
Admitting: STUDENT IN AN ORGANIZED HEALTH CARE EDUCATION/TRAINING PROGRAM
Payer: MEDICARE

## 2024-08-12 DIAGNOSIS — R92.8 ABNORMALITY OF RIGHT BREAST ON SCREENING MAMMOGRAM: ICD-10-CM

## 2024-08-12 PROCEDURE — 76642 ULTRASOUND BREAST LIMITED: CPT

## 2024-08-14 ENCOUNTER — OFFICE VISIT (OUTPATIENT)
Dept: SURGERY | Facility: CLINIC | Age: 84
End: 2024-08-14
Payer: MEDICARE

## 2024-08-14 VITALS
BODY MASS INDEX: 27.97 KG/M2 | DIASTOLIC BLOOD PRESSURE: 82 MMHG | SYSTOLIC BLOOD PRESSURE: 130 MMHG | HEIGHT: 62 IN | WEIGHT: 152 LBS

## 2024-08-14 DIAGNOSIS — R92.8 ABNORMALITY OF RIGHT BREAST ON SCREENING MAMMOGRAM: Primary | ICD-10-CM

## 2024-08-14 DIAGNOSIS — Z85.3 PERSONAL HISTORY OF BREAST CANCER: ICD-10-CM

## 2024-08-14 DIAGNOSIS — Z12.31 ENCOUNTER FOR SCREENING MAMMOGRAM FOR MALIGNANT NEOPLASM OF BREAST: ICD-10-CM

## 2024-08-14 DIAGNOSIS — E66.3 OVERWEIGHT WITH BODY MASS INDEX (BMI) OF 28 TO 28.9 IN ADULT: ICD-10-CM

## 2024-08-14 NOTE — PATIENT INSTRUCTIONS

## 2024-08-14 NOTE — PROGRESS NOTES
Office Established Patient Note:     Referring Provider: No ref. provider found    Chief Complaint   Patient presents with    Follow-up     Patient is here for a 3 month breast follow up       Subjective .     History of present illness:  Suzanna Jolly is a 83 y.o. female with a history of right breast cancer s/p lumpectomy and radiation + 5 years of adjuvant Tamoxifen. She has had multiple subsequent biopsies. She denies any changes today to her breasts.     History  Past Medical History:   Diagnosis Date    Anemia, unspecified     Arthritis     Biliary stones     Bone/cartilage disorder     Bronchitis     Chronic obstructive asthma with status asthmaticus     History of bone density study     DR. HONEYCUTT    Hx of radiation therapy 2013    right breast    Hypertension     Malignant neoplasm of upper-outer quadrant of right female breast 2013    RIGHT SIDED BREAST CANCER WITH LUMPECTOMY    Osteoporosis     PONV (postoperative nausea and vomiting)     Pure hypercholesterolemia     Type 2 diabetes mellitus without complications     Urinary incontinence    ,   Past Surgical History:   Procedure Laterality Date    BREAST BIOPSY Right 11/11/2013    cancer    BREAST BIOPSY Right 1972     with Benign findings    BREAST BIOPSY Right 2018    benign    BREAST BIOPSY Right 9/14/2020    Procedure: WIDE EXCISION RIGHT BREAST NODULE;  Surgeon: Melina Lan MD;  Location: [facility] OR;  Service: General;  Laterality: Right;    BREAST LUMPECTOMY Right 12/03/2013    BREAST LUMPECTOMY      RIGHT    CHOLECYSTECTOMY      COLONOSCOPY  2010     Dr. Gardiner. facility used [facility]    ERCP AND ENDOSCOPY      FOOT SURGERY  09/2014     Dr. Sherri S[facility] in [facility]    MAMMO BILATERAL  08/19/2014    PAP SMEAR      SKIN BIOPSY Right 11/16/2013    FOOT AND LEG; BENIGN FINDINGS    TUBAL ABDOMINAL LIGATION     ,   Family History   Problem Relation Age of Onset    Stroke Brother     No Known Problems Daughter     No Known Problems Son      Other Brother         hx of many comorbidities    Down syndrome Brother     Stroke Mother     Pneumonia Father     Stroke Father     No Known Problems Sister     No Known Problems Maternal Grandmother     No Known Problems Paternal Grandmother     No Known Problems Maternal Aunt     No Known Problems Paternal Aunt     Breast cancer Neg Hx     BRCA 1/2 Neg Hx     Colon cancer Neg Hx     Endometrial cancer Neg Hx     Ovarian cancer Neg Hx    ,   Social History     Tobacco Use    Smoking status: Never     Passive exposure: Never    Smokeless tobacco: Never   Vaping Use    Vaping status: Never Used   Substance Use Topics    Alcohol use: No    Drug use: No   , (Not in a hospital admission)   and Allergies:  Septra [sulfamethoxazole-trimethoprim], Ceftin [cefuroxime], Cefuroxime axetil, Ciprofloxacin, Keflex [cephalexin], and Hydrocodone    Current Outpatient Medications:     aspirin 81 MG EC tablet, Take 1 tablet by mouth Daily., Disp: , Rfl:     B Complex-C (SUPER B COMPLEX PO), Take 1 tablet by mouth Daily., Disp: , Rfl:     benzonatate (TESSALON) 100 MG capsule, Take 1 capsule by mouth 3 (Three) Times a Day As Needed., Disp: , Rfl:     Calcium Carbonate (CALCIUM 500 PO), Take 500 mg by mouth Daily., Disp: , Rfl:     ciclopirox (LOPROX) 0.77 % cream, APPLY THIN LAYER TO LOW ABDOMEN AREA ONCE A DAY X 1 MONTH, Disp: , Rfl:     diazePAM (Valium) 2 MG tablet, Take 1 tablet by mouth 1 (One) Time As Needed for Anxiety (before mri) for up to 1 dose., Disp: 1 tablet, Rfl: 0    famotidine (PEPCID) 20 MG tablet, Take 1 tablet by mouth At Night As Needed., Disp: , Rfl:     fluticasone (FLONASE) 50 MCG/ACT nasal spray, , Disp: , Rfl:     Fluticasone-Umeclidin-Vilant (Trelegy Ellipta) 100-62.5-25 MCG/ACT inhaler, Inhale 1 puff Daily., Disp: , Rfl:     IPRATROPIUM BROMIDE NA, 2 sprays into the nostril(s) as directed by provider Daily., Disp: , Rfl:     levocetirizine (XYZAL) 5 MG tablet, Take 1 tablet by mouth Daily., Disp: , Rfl:  "    metFORMIN (GLUCOPHAGE) 500 MG tablet, Take 1 tablet by mouth Daily With Breakfast., Disp: , Rfl:     montelukast (SINGULAIR) 10 MG tablet, Take 1 tablet by mouth Daily., Disp: , Rfl:     Multiple Vitamins-Minerals (MULTIVITAMIN WITH MINERALS) tablet tablet, Take 1 tablet by mouth Daily., Disp: , Rfl:     oxybutynin XL (DITROPAN-XL) 5 MG 24 hr tablet, Take 1 tablet by mouth Daily., Disp: , Rfl:     rosuvastatin (CRESTOR) 10 MG tablet, Take 1 tablet by mouth Daily., Disp: , Rfl:     Symbicort 160-4.5 MCG/ACT inhaler, , Disp: , Rfl:     triamterene-hydrochlorothiazide (DYAZIDE) 37.5-25 MG per capsule, Take 1 capsule by mouth Daily., Disp: , Rfl:     ursodiol (ACTIGALL) 250 MG tablet, Take 1 tablet by mouth Daily., Disp: , Rfl: 5    vitamin D3 125 MCG (5000 UT) capsule capsule, Take 1 capsule by mouth Daily., Disp: , Rfl:     vitamin E 400 UNIT capsule, Take 1 capsule by mouth Daily., Disp: , Rfl:     Current Facility-Administered Medications:     lidocaine (XYLOCAINE) 1 % injection 10 mL, 10 mL, Subcutaneous, Once, Pham Santiago MD    lidocaine 1% - EPINEPHrine 1:088376 (XYLOCAINE W/EPI) 1 %-1:389729 injection 10 mL, 10 mL, Injection, Once, Pham Santiago MD      Objective     Vital Signs   /82 (BP Location: Left arm, Patient Position: Sitting, Cuff Size: Adult)   Ht 157.5 cm (62.01\")   Wt 68.9 kg (152 lb)   LMP  (LMP Unknown)   BMI 27.79 kg/m²      Physical Exam:  General appearance - alert, well appearing, and in no distress  Mental status - alert, oriented to person, place, and time  Eyes - pupils equal and reactive, extraocular eye movements intact  Neck - supple, no significant adenopathy  Chest - no tachypnea, retractions or cyanosis  Heart - normal rate and regular rhythm  Abdomen - soft, nontender, nondistended, no masses or organomegaly  Neurological - alert, oriented, normal speech, no focal findings or movement disorder noted  Breast Exam: Bilateral nipples everted. Patient examined in " the supine position with the ipsilateral arm above the head. In the upper outer quadrant of the right breast there is a deep indentation at her prior incision. No other palpable masses bilaterally. No nipple discharge bilaterally. No palpable axillary nor supraclavicular adenopathy bilaterally.     Results Review:    The following data was reviewed by: Lima Swenson MD on 08/14/2024:  US Breast Right Limited (08/12/2024 12:55)     Assessment & Plan       Diagnoses and all orders for this visit:    1. Abnormality of right breast on screening mammogram (Primary)  -     US Breast Right Limited; Future  -     Mammo Screening Digital Tomosynthesis Bilateral With CAD; Future    2. Overweight with body mass index (BMI) of 28 to 28.9 in adult    3. Personal history of breast cancer  -     US Breast Right Limited; Future  -     Mammo Screening Digital Tomosynthesis Bilateral With CAD; Future    4. Encounter for screening mammogram for malignant neoplasm of breast  -     Mammo Screening Digital Tomosynthesis Bilateral With CAD; Future       US and exam benign. Follow up US and mammogram in 3 months. Follow up with me in 3 months.         Lima Swenson MD  08/14/24  13:02 CDT

## 2024-09-24 LAB
ALBUMIN SERPL-MCNC: 4.2 G/DL (ref 3.5–5.2)
ALP SERPL-CCNC: 52 U/L (ref 35–104)
ALT SERPL-CCNC: 14 U/L (ref 5–33)
ANION GAP SERPL CALCULATED.3IONS-SCNC: 9 MMOL/L (ref 7–19)
AST SERPL-CCNC: 21 U/L (ref 5–32)
BILIRUB SERPL-MCNC: 0.5 MG/DL (ref 0.2–1.2)
BUN SERPL-MCNC: 15 MG/DL (ref 8–23)
CALCIUM SERPL-MCNC: 9.4 MG/DL (ref 8.8–10.2)
CHLORIDE SERPL-SCNC: 101 MMOL/L (ref 98–111)
CHOLEST SERPL-MCNC: 128 MG/DL (ref 0–199)
CO2 SERPL-SCNC: 29 MMOL/L (ref 22–29)
CREAT SERPL-MCNC: 0.7 MG/DL (ref 0.5–0.9)
CREAT UR-MCNC: 75.4 MG/DL (ref 28–217)
GLUCOSE SERPL-MCNC: 107 MG/DL (ref 70–99)
HBA1C MFR BLD: 5.7 % (ref 4–5.6)
HDLC SERPL-MCNC: 67 MG/DL (ref 40–60)
LDLC SERPL CALC-MCNC: 40 MG/DL
MICROALBUMIN UR-MCNC: 2.1 MG/DL (ref 0–1.99)
MICROALBUMIN/CREAT UR-RTO: 27.9 MG/G
POTASSIUM SERPL-SCNC: 3.8 MMOL/L (ref 3.5–5)
PROT SERPL-MCNC: 6.9 G/DL (ref 6.4–8.3)
SODIUM SERPL-SCNC: 139 MMOL/L (ref 136–145)
TRIGL SERPL-MCNC: 107 MG/DL (ref 0–149)

## 2024-10-11 DIAGNOSIS — Z85.3 HISTORY OF BREAST CANCER: Primary | ICD-10-CM

## 2024-10-17 ENCOUNTER — LAB (OUTPATIENT)
Dept: LAB | Facility: HOSPITAL | Age: 84
End: 2024-10-17
Payer: MEDICARE

## 2024-10-17 ENCOUNTER — OFFICE VISIT (OUTPATIENT)
Dept: ONCOLOGY | Facility: CLINIC | Age: 84
End: 2024-10-17
Payer: MEDICARE

## 2024-10-17 VITALS
SYSTOLIC BLOOD PRESSURE: 118 MMHG | OXYGEN SATURATION: 97 % | RESPIRATION RATE: 16 BRPM | HEIGHT: 62 IN | DIASTOLIC BLOOD PRESSURE: 76 MMHG | HEART RATE: 92 BPM | TEMPERATURE: 97.3 F | BODY MASS INDEX: 27.84 KG/M2 | WEIGHT: 151.3 LBS

## 2024-10-17 DIAGNOSIS — Z85.3 HISTORY OF BREAST CANCER: ICD-10-CM

## 2024-10-17 DIAGNOSIS — Z85.3 HISTORY OF BREAST CANCER: Primary | ICD-10-CM

## 2024-10-17 DIAGNOSIS — D72.829 LEUKOCYTOSIS, UNSPECIFIED TYPE: ICD-10-CM

## 2024-10-17 LAB
ALBUMIN SERPL-MCNC: 4.1 G/DL (ref 3.5–5.2)
ALBUMIN/GLOB SERPL: 1.6 G/DL
ALP SERPL-CCNC: 57 U/L (ref 39–117)
ALT SERPL W P-5'-P-CCNC: 17 U/L (ref 1–33)
ANION GAP SERPL CALCULATED.3IONS-SCNC: 11 MMOL/L (ref 5–15)
AST SERPL-CCNC: 21 U/L (ref 1–32)
BASOPHILS # BLD AUTO: 0.07 10*3/MM3 (ref 0–0.2)
BASOPHILS NFR BLD AUTO: 0.7 % (ref 0–1.5)
BILIRUB SERPL-MCNC: 0.3 MG/DL (ref 0–1.2)
BUN SERPL-MCNC: 16 MG/DL (ref 8–23)
BUN/CREAT SERPL: 22.5 (ref 7–25)
CALCIUM SPEC-SCNC: 9.4 MG/DL (ref 8.6–10.5)
CHLORIDE SERPL-SCNC: 100 MMOL/L (ref 98–107)
CO2 SERPL-SCNC: 27 MMOL/L (ref 22–29)
CREAT SERPL-MCNC: 0.71 MG/DL (ref 0.57–1)
DEPRECATED RDW RBC AUTO: 40.1 FL (ref 37–54)
EGFRCR SERPLBLD CKD-EPI 2021: 83.4 ML/MIN/1.73
EOSINOPHIL # BLD AUTO: 0.18 10*3/MM3 (ref 0–0.4)
EOSINOPHIL NFR BLD AUTO: 1.7 % (ref 0.3–6.2)
ERYTHROCYTE [DISTWIDTH] IN BLOOD BY AUTOMATED COUNT: 11.8 % (ref 12.3–15.4)
GLOBULIN UR ELPH-MCNC: 2.6 GM/DL
GLUCOSE SERPL-MCNC: 179 MG/DL (ref 65–99)
HCT VFR BLD AUTO: 38.4 % (ref 34–46.6)
HGB BLD-MCNC: 13.9 G/DL (ref 12–15.9)
IMM GRANULOCYTES # BLD AUTO: 0.05 10*3/MM3 (ref 0–0.05)
IMM GRANULOCYTES NFR BLD AUTO: 0.5 % (ref 0–0.5)
LYMPHOCYTES # BLD AUTO: 1.32 10*3/MM3 (ref 0.7–3.1)
LYMPHOCYTES NFR BLD AUTO: 12.8 % (ref 19.6–45.3)
MCH RBC QN AUTO: 33.7 PG (ref 26.6–33)
MCHC RBC AUTO-ENTMCNC: 36.2 G/DL (ref 31.5–35.7)
MCV RBC AUTO: 93.2 FL (ref 79–97)
MONOCYTES # BLD AUTO: 0.48 10*3/MM3 (ref 0.1–0.9)
MONOCYTES NFR BLD AUTO: 4.7 % (ref 5–12)
NEUTROPHILS NFR BLD AUTO: 79.6 % (ref 42.7–76)
NEUTROPHILS NFR BLD AUTO: 8.21 10*3/MM3 (ref 1.7–7)
NRBC BLD AUTO-RTO: 0 /100 WBC (ref 0–0.2)
PLATELET # BLD AUTO: 238 10*3/MM3 (ref 140–450)
PMV BLD AUTO: 9.1 FL (ref 6–12)
POTASSIUM SERPL-SCNC: 4 MMOL/L (ref 3.5–5.2)
PROT SERPL-MCNC: 6.7 G/DL (ref 6–8.5)
RBC # BLD AUTO: 4.12 10*6/MM3 (ref 3.77–5.28)
SODIUM SERPL-SCNC: 138 MMOL/L (ref 136–145)
WBC NRBC COR # BLD AUTO: 10.31 10*3/MM3 (ref 3.4–10.8)

## 2024-10-17 PROCEDURE — 80053 COMPREHEN METABOLIC PANEL: CPT

## 2024-10-17 PROCEDURE — 36415 COLL VENOUS BLD VENIPUNCTURE: CPT

## 2024-10-17 PROCEDURE — 85025 COMPLETE CBC W/AUTO DIFF WBC: CPT

## 2024-10-17 RX ORDER — BUDESONIDE AND FORMOTEROL FUMARATE DIHYDRATE 80; 4.5 UG/1; UG/1
2 AEROSOL RESPIRATORY (INHALATION)
COMMUNITY
End: 2024-11-02

## 2024-10-17 NOTE — PROGRESS NOTES
[facility]  HEMATOLOGY & ONCOLOGY    [facility] [place] [facility] [place] AND [facility]COLOGY  [address]  [place] [address] [phone]    Patient Name: Suzanna Jolly  Encounter Date: 10/17/2024  YOB: 1940  Patient Number: [id number]    REASON FOR VISIT: Ms. Jolly is a 84 y.o. patient here today in follow up for ductal carcinoma in situ of the right breast.  She was diagnosed in 2013 and underwent lumpectomy.  This was followed by radiation therapy and then tamoxifen for 5 years.      She did have to had to have a  lump removed from the incision site at the breast cancer site in September 2020.  US confirmed the lesion and therefore Dr Siu discussed it with the patient and they decided to complete another lumpectomy.  Pathology on 9/14/20 shows no malignancy just fat necrosis.     Mammogram and Ultrasound 10/3/23 showed  Persistence of an asymmetry in the retroareolar region of the right breast with corresponding ultrasound abnormality. These findings are considered suspicious.  She had ultrasound guided biopsy per Dr. wSenson on 10/10/23 which again showed benign fat necrosis.  Radiologist recommended 6 month mammogram.    INTERVAL HISTORY  Pt presents to clinic today for continued follow up.        She had ultrasound of her right breast on 8/12/24.  There was no definite change of the area in the right breast which radiologist feels may represent fat necrosis.  She continues to follow with Dr. Swenson.    She had labs drawn and results were reviewed with her in office.       PAST MEDICAL HISTORY:  ALLERGIES:  Allergies   Allergen Reactions    Septra [Sulfamethoxazole-Trimethoprim] Hives and Nausea And Vomiting    Ceftin [Cefuroxime] Hives and GI Intolerance    Cefuroxime Axetil Hives and Nausea And Vomiting    Ciprofloxacin Hives, Nausea And Vomiting and Rash    Keflex [Cephalexin] Hives, Nausea And Vomiting and GI  Intolerance    Hydrocodone Rash     Rash and flushed         CURRENT MEDICATIONS:  Outpatient Encounter Medications as of 10/17/2024   Medication Sig Dispense Refill    aspirin 81 MG EC tablet Take 1 tablet by mouth Daily.      B Complex-C (SUPER B COMPLEX PO) Take 1 tablet by mouth Daily.      benzonatate (TESSALON) 100 MG capsule Take 1 capsule by mouth 3 (Three) Times a Day As Needed.      budesonide-formoterol (SYMBICORT) 80-4.5 MCG/ACT inhaler Inhale 2 puffs 2 (Two) Times a Day.      Calcium Carbonate (CALCIUM 500 PO) Take 500 mg by mouth Daily.      ciclopirox (LOPROX) 0.77 % cream APPLY THIN LAYER TO LOW ABDOMEN AREA ONCE A DAY X 1 MONTH      diazePAM (Valium) 2 MG tablet Take 1 tablet by mouth 1 (One) Time As Needed for Anxiety (before mri) for up to 1 dose. 1 tablet 0    famotidine (PEPCID) 20 MG tablet Take 1 tablet by mouth At Night As Needed.      fluticasone (FLONASE) 50 MCG/ACT nasal spray       Fluticasone-Umeclidin-Vilant (Trelegy Ellipta) 100-62.5-25 MCG/ACT inhaler Inhale 1 puff Daily.      IPRATROPIUM BROMIDE NA 2 sprays into the nostril(s) as directed by provider Daily.      levocetirizine (XYZAL) 5 MG tablet Take 1 tablet by mouth Daily.      metFORMIN (GLUCOPHAGE) 500 MG tablet Take 1 tablet by mouth Daily With Breakfast.      montelukast (SINGULAIR) 10 MG tablet Take 1 tablet by mouth Daily.      Multiple Vitamins-Minerals (MULTIVITAMIN WITH MINERALS) tablet tablet Take 1 tablet by mouth Daily.      oxybutynin XL (DITROPAN-XL) 5 MG 24 hr tablet Take 1 tablet by mouth Daily.      rosuvastatin (CRESTOR) 10 MG tablet Take 1 tablet by mouth Daily.      triamterene-hydrochlorothiazide (DYAZIDE) 37.5-25 MG per capsule Take 1 capsule by mouth Daily.      ursodiol (ACTIGALL) 250 MG tablet Take 1 tablet by mouth Daily.  5    vitamin D3 125 MCG (5000 UT) capsule capsule Take 1 capsule by mouth Daily.      vitamin E 400 UNIT capsule Take 1 capsule by mouth Daily.      [DISCONTINUED] Symbicort 160-4.5  MCG/ACT inhaler  (Patient not taking: Reported on 10/17/2024)       Facility-Administered Encounter Medications as of 10/17/2024   Medication Dose Route Frequency Provider Last Rate Last Admin    lidocaine (XYLOCAINE) 1 % injection 10 mL  10 mL Subcutaneous Once Pham Santiago MD        lidocaine 1% - EPINEPHrine 1:468507 (XYLOCAINE W/EPI) 1 %-1:437527 injection 10 mL  10 mL Injection Once Pham Santiago MD         ADULT ILLNESSES:  Patient Active Problem List   Diagnosis Code    Sepsis A41.9    Acute colitis K52.9    Nausea R11.0    Abnormal liver function R94.5    Ductal carcinoma in situ (DCIS) of right breast D05.11    Hypertension I10    Osteoporosis M81.0     SURGERIES:  Past Surgical History:   Procedure Laterality Date    BREAST BIOPSY Right 11/11/2013    cancer    BREAST BIOPSY Right 1972     with Benign findings    BREAST BIOPSY Right 2018    benign    BREAST BIOPSY Right 9/14/2020    Procedure: WIDE EXCISION RIGHT BREAST NODULE;  Surgeon: Melina Lan MD;  Location: [facility] OR;  Service: General;  Laterality: Right;    BREAST LUMPECTOMY Right 12/03/2013    BREAST LUMPECTOMY      RIGHT    CHOLECYSTECTOMY      COLONOSCOPY  2010     Dr. Gardiner. facility used [facility]    ERCP AND ENDOSCOPY      FOOT SURGERY  09/2014     [facility]ri [facility] in [facility]    MAMMO BILATERAL  08/19/2014    PAP SMEAR      SKIN BIOPSY Right 11/16/2013    FOOT AND LEG; BENIGN FINDINGS    TUBAL ABDOMINAL LIGATION       HEALTH MAINTENANCE ITEMS:    Last Completed Colonoscopy       November 2011 - negative          Immunization History   Administered Date(s) Administered    COVID-19 (MODERNA) 12YRS+ (SPIKEVAX) 11/09/2023    COVID-19 (MODERNA) 1st,2nd,3rd Dose Monovalent 02/12/2021, 03/12/2021    COVID-19 (MODERNA) BIVALENT 12+YRS 10/14/2022    COVID-19 (MODERNA) Monovalent Original Booster 10/30/2021, 05/05/2022    COVID-19 (PFIZER) 12YRS+ (COMIRNATY) 10/08/2024     Last Completed Mammogram         Status Date       MAMMOGRAM Done 9/1/2021 Mammogram 9/1/2021 showed no sign of malignancy.                   FAMILY HISTORY:  Family History   Problem Relation Age of Onset    Stroke Brother     No Known Problems Daughter     No Known Problems Son     Other Brother         hx of many comorbidities    Down syndrome Brother     Stroke Mother     Pneumonia Father     Stroke Father     No Known Problems Sister     No Known Problems Maternal Grandmother     No Known Problems Paternal Grandmother     No Known Problems Maternal Aunt     No Known Problems Paternal Aunt     Breast cancer Neg Hx     BRCA 1/2 Neg Hx     Colon cancer Neg Hx     Endometrial cancer Neg Hx     Ovarian cancer Neg Hx      SOCIAL HISTORY:  Social History     Socioeconomic History    Marital status:    Tobacco Use    Smoking status: Never     Passive exposure: Never    Smokeless tobacco: Never   Vaping Use    Vaping status: Never Used   Substance and Sexual Activity    Alcohol use: No    Drug use: No    Sexual activity: Defer       REVIEW OF SYSTEMS:  Review of Systems   Constitutional:  Negative for activity change, appetite change, chills, diaphoresis, fatigue, fever and unexpected weight loss.   HENT:  Negative for ear pain, nosebleeds, sinus pressure, sore throat and voice change.    Eyes:  Negative for blurred vision, double vision, pain and visual disturbance.   Respiratory:  Negative for cough and shortness of breath.    Cardiovascular:  Negative for chest pain, palpitations and leg swelling.   Gastrointestinal:  Negative for abdominal pain, anal bleeding, blood in stool, constipation, diarrhea, nausea and vomiting.   Endocrine: Negative for heat intolerance, polydipsia and polyuria.   Genitourinary:  Negative for dysuria, frequency, hematuria, urgency and urinary incontinence.   Musculoskeletal:  Positive for arthralgias. Negative for myalgias.   Skin:  Negative for rash and skin lesions.   Neurological:  Negative for dizziness, tremors, seizures,  "syncope, speech difficulty, weakness and headache.   Hematological:  Negative for adenopathy. Does not bruise/bleed easily.   Psychiatric/Behavioral:  Negative for dysphoric mood, sleep disturbance, suicidal ideas and depressed mood.        /76   Pulse 92   Temp 97.3 °F (36.3 °C)   Resp 16   Ht 157.5 cm (62\")   Wt 68.6 kg (151 lb 4.8 oz)   LMP  (LMP Unknown)   SpO2 97%   BMI 27.67 kg/m²  Body surface area is 1.7 meters squared.  Pain Score    10/17/24 1421   PainSc: 0-No pain       Physical Exam  Vitals reviewed.   Constitutional:       Appearance: Normal appearance. She is well-developed.   HENT:      Head: Atraumatic.   Eyes:      General: No scleral icterus.     Pupils: Pupils are equal, round, and reactive to light.   Neck:      Trachea: Trachea normal.   Cardiovascular:      Rate and Rhythm: Normal rate and regular rhythm.   Pulmonary:      Effort: Pulmonary effort is normal.      Breath sounds: Normal breath sounds. No wheezing, rhonchi or rales.   Chest:   Breasts:     Left: Normal.      Comments: Right breast with surgical change noted  Abdominal:      Palpations: Abdomen is soft.      Tenderness: There is no abdominal tenderness. There is no guarding or rebound.   Musculoskeletal:      Cervical back: Neck supple.      Comments: Intermittent ankle swelling. Pt states she has broken them and they swell since then   Lymphadenopathy:      Cervical: No cervical adenopathy.      Upper Body:      Right upper body: No supraclavicular adenopathy.      Left upper body: No supraclavicular adenopathy.   Skin:     General: Skin is warm and dry.   Neurological:      General: No focal deficit present.      Mental Status: She is alert and oriented to person, place, and time.   Psychiatric:         Mood and Affect: Mood normal.         Behavior: Behavior normal.         Suzanna DE LEON Shanae reports a pain score of 0.   Patient's Body mass index is 27.67 kg/m². BMI is above normal parameters. Recommendations " include: defer to pcp .      LABS    Lab Results - Last 18 Months   Lab Units 10/17/24  1413 10/18/23  1411 08/14/23  1021   HEMOGLOBIN g/dL 13.9 14.4 14.7   HEMATOCRIT % 38.4 41.6 42.6   MCV fL 93.2 94.1 96.8   WBC 10*3/mm3 10.31 11.91* 7.6   RDW % 11.8* 12.0* 11.7   MPV fL 9.1 9.4 10.1   PLATELETS 10*3/mm3 238 207 212   IMM GRAN % % 0.5 0.4  --    NEUTROS ABS 10*3/mm3 8.21* 9.87* 5.5   LYMPHS ABS 10*3/mm3 1.32 1.35 1.3   MONOS ABS 10*3/mm3 0.48 0.46 0.60   EOS ABS 10*3/mm3 0.18 0.13 0.20   BASOS ABS 10*3/mm3 0.07 0.05 0.10   IMMATURE GRANS (ABS) 10*3/mm3 0.05 0.05 0.0   NRBC /100 WBC 0.0 0.0  --        Lab Results - Last 18 Months   Lab Units 10/17/24  1413 09/24/24  0952 05/02/24  0850 12/05/23  1414 10/18/23  1411   GLUCOSE mg/dL 179* 107*  --   --  180*   SODIUM mmol/L 138 139  --   --  137   POTASSIUM mmol/L 4.0 3.8  --   --  4.2   TOTAL CO2 mmol/L  --  29  --   --   --    CO2 mmol/L 27.0  --   --   --  26.0   CHLORIDE mmol/L 100 101  --   --  102   ANION GAP mmol/L 11.0 9  --   --  9.0   CREATININE mg/dL 0.71 0.7 0.90 0.70 0.70   BUN mg/dL 16 15  --   --  15   BUN / CREAT RATIO  22.5  --   --   --  21.4   CALCIUM mg/dL 9.4 9.4  --   --  9.8   ALK PHOS U/L 57 52  --   --  51   TOTAL PROTEIN g/dL 6.7 6.9  --   --  6.7   ALT (SGPT) U/L 17 14  --   --  15   AST (SGOT) U/L 21 21  --   --  25   BILIRUBIN mg/dL 0.3 0.5  --   --  0.4   ALBUMIN g/dL 4.1 4.2  --   --  4.2   GLOBULIN gm/dL 2.6  --   --   --  2.5       ASSESSMENT  1. History of breast cancer    2. Leukocytosis, unspecified type          1. History of Right breast ductal carcinoma in situ diagnosed in 2013  Initial Stage: AJCC TNM pTisNXMX, stage 0  Treatment: Lumpectomy followed by adjuvant radiation therapy.  She then took TMX for 5 years which was completed in August 2019.  Right breast nodule at previous tumor bed site 9/2020, resected and negative. Path - fat necrosis  Mammogram and Ultrasound 10/3/23 showed  Persistence of an asymmetry in the  retroareolar region of the right breast with corresponding ultrasound abnormality. These findings are considered suspicious.  She had ultrasound guided biopsy on 10/10/23 which again showed benign fat necrosis.  Radiologist recommended 6 month mammogram.    US Breast Right Limited (08/12/2024 12:55)   Continue to follow with Dr. Swenson      2. Leukocytosis.   ANC today 8.21. Will order flow cytometry        Osteopenia  -BMD on 08/30/2021 with Lumbar spine T Score -1.2 and Left Hip T Score -1.0  -Taking calcium + D.   - Managed by PCP              PLAN  Stable for observation  Continue follow up with Dr. Lima Swenson   Continue current medications, treatment plans and follow up with PCP and any other providers  Return to office 1 year   Pre-office labs for CBC CMP  Care discussed with patient.  Understanding expressed.  Patient agreeable with plan.        Martha Saravia, SOURAV  10/17/2024

## 2024-10-20 LAB — Lab: NORMAL

## 2024-11-04 ENCOUNTER — HOSPITAL ENCOUNTER (OUTPATIENT)
Dept: ULTRASOUND IMAGING | Facility: HOSPITAL | Age: 84
Discharge: HOME | End: 2024-11-04
Payer: MEDICARE

## 2024-11-04 ENCOUNTER — HOSPITAL ENCOUNTER (OUTPATIENT)
Dept: MAMMOGRAPHY | Facility: HOSPITAL | Age: 84
Discharge: HOME | End: 2024-11-04
Payer: MEDICARE

## 2024-11-04 DIAGNOSIS — R92.8 ABNORMALITY OF RIGHT BREAST ON SCREENING MAMMOGRAM: ICD-10-CM

## 2024-11-04 DIAGNOSIS — Z85.3 PERSONAL HISTORY OF BREAST CANCER: ICD-10-CM

## 2024-11-04 DIAGNOSIS — Z12.31 ENCOUNTER FOR SCREENING MAMMOGRAM FOR MALIGNANT NEOPLASM OF BREAST: ICD-10-CM

## 2024-11-04 LAB — REF LAB TEST METHOD: NORMAL

## 2024-11-04 PROCEDURE — 77067 SCR MAMMO BI INCL CAD: CPT

## 2024-11-04 PROCEDURE — 77066 DX MAMMO INCL CAD BI: CPT

## 2024-11-04 PROCEDURE — 76642 ULTRASOUND BREAST LIMITED: CPT

## 2024-11-04 PROCEDURE — 77063 BREAST TOMOSYNTHESIS BI: CPT

## 2024-11-04 PROCEDURE — G0279 TOMOSYNTHESIS, MAMMO: HCPCS

## 2024-11-11 ENCOUNTER — OFFICE VISIT (OUTPATIENT)
Dept: SURGERY | Facility: CLINIC | Age: 84
End: 2024-11-11
Payer: MEDICARE

## 2024-11-11 VITALS
OXYGEN SATURATION: 98 % | HEIGHT: 62 IN | WEIGHT: 151 LBS | SYSTOLIC BLOOD PRESSURE: 145 MMHG | HEART RATE: 90 BPM | BODY MASS INDEX: 27.79 KG/M2 | DIASTOLIC BLOOD PRESSURE: 74 MMHG

## 2024-11-11 DIAGNOSIS — E66.3 OVERWEIGHT WITH BODY MASS INDEX (BMI) OF 28 TO 28.9 IN ADULT: ICD-10-CM

## 2024-11-11 DIAGNOSIS — Z85.3 PERSONAL HISTORY OF BREAST CANCER: Primary | ICD-10-CM

## 2024-11-11 DIAGNOSIS — R92.8 ABNORMALITY OF RIGHT BREAST ON SCREENING MAMMOGRAM: ICD-10-CM

## 2024-11-11 NOTE — PATIENT INSTRUCTIONS

## 2024-11-11 NOTE — PROGRESS NOTES
Office Established Patient Note:     Referring Provider: No ref. provider found    Chief Complaint   Patient presents with    Follow-up       Subjective .     History of present illness:  Suzanna Jolly is a 84 y.o. female with a history of right breast cancer s/p lumpectomy and radiation + 5 years of adjuvant Tamoxifen. She has had multiple subsequent biopsies. She denies any changes today to her breasts.   History of Present Illness    Supplemental Information  She is going to have foot surgery on Thursday.       History  Past Medical History:   Diagnosis Date    Anemia, unspecified     Arthritis     Biliary stones     Bone/cartilage disorder     Bronchitis     Chronic obstructive asthma with status asthmaticus     History of bone density study     DR. HONEYCUTT    Hx of radiation therapy 2013    right breast    Hypertension     Malignant neoplasm of upper-outer quadrant of right female breast 2013    RIGHT SIDED BREAST CANCER WITH LUMPECTOMY    Osteoporosis     PONV (postoperative nausea and vomiting)     Pure hypercholesterolemia     Type 2 diabetes mellitus without complications     Urinary incontinence    ,   Past Surgical History:   Procedure Laterality Date    BREAST BIOPSY Right 11/11/2013    cancer    BREAST BIOPSY Right 1972     with Benign findings    BREAST BIOPSY Right 2018    benign    BREAST BIOPSY Right 9/14/2020    Procedure: WIDE EXCISION RIGHT BREAST NODULE;  Surgeon: Melina Lan MD;  Location: [facility] OR;  Service: General;  Laterality: Right;    BREAST LUMPECTOMY Right 12/03/2013    BREAST LUMPECTOMY      RIGHT    CHOLECYSTECTOMY      COLONOSCOPY  2010     Dr. Gardiner. facility used [facility]    ERCP AND ENDOSCOPY      FOOT SURGERY  09/2014     Dr. Sherri Saha in [facility]    MAMMO BILATERAL  08/19/2014    PAP SMEAR      SKIN BIOPSY Right 11/16/2013    FOOT AND LEG; BENIGN FINDINGS    TUBAL ABDOMINAL LIGATION     ,   Family History   Problem Relation Age of Onset    Stroke Brother      No Known Problems Daughter     No Known Problems Son     Other Brother         hx of many comorbidities    Down syndrome Brother     Stroke Mother     Pneumonia Father     Stroke Father     No Known Problems Sister     No Known Problems Maternal Grandmother     No Known Problems Paternal Grandmother     No Known Problems Maternal Aunt     No Known Problems Paternal Aunt     Breast cancer Neg Hx     BRCA 1/2 Neg Hx     Colon cancer Neg Hx     Endometrial cancer Neg Hx     Ovarian cancer Neg Hx    ,   Social History     Tobacco Use    Smoking status: Never     Passive exposure: Never    Smokeless tobacco: Never   Vaping Use    Vaping status: Never Used   Substance Use Topics    Alcohol use: No    Drug use: No   , (Not in a hospital admission)   and Allergies:  Septra [sulfamethoxazole-trimethoprim], Ceftin [cefuroxime], Cefuroxime axetil, Ciprofloxacin, Keflex [cephalexin], and Hydrocodone    Current Outpatient Medications:     aspirin 81 MG EC tablet, Take 1 tablet by mouth Daily., Disp: , Rfl:     B Complex-C (SUPER B COMPLEX PO), Take 1 tablet by mouth Daily., Disp: , Rfl:     benzonatate (TESSALON) 100 MG capsule, Take 1 capsule by mouth 3 (Three) Times a Day As Needed., Disp: , Rfl:     Calcium Carbonate (CALCIUM 500 PO), Take 500 mg by mouth Daily., Disp: , Rfl:     ciclopirox (LOPROX) 0.77 % cream, APPLY THIN LAYER TO LOW ABDOMEN AREA ONCE A DAY X 1 MONTH, Disp: , Rfl:     diazePAM (Valium) 2 MG tablet, Take 1 tablet by mouth 1 (One) Time As Needed for Anxiety (before mri) for up to 1 dose., Disp: 1 tablet, Rfl: 0    famotidine (PEPCID) 20 MG tablet, Take 1 tablet by mouth At Night As Needed., Disp: , Rfl:     fluticasone (FLONASE) 50 MCG/ACT nasal spray, , Disp: , Rfl:     Fluticasone-Umeclidin-Vilant (Trelegy Ellipta) 100-62.5-25 MCG/ACT inhaler, Inhale 1 puff Daily., Disp: , Rfl:     IPRATROPIUM BROMIDE NA, 2 sprays into the nostril(s) as directed by provider Daily., Disp: , Rfl:     levocetirizine (XYZAL) 5  "MG tablet, Take 1 tablet by mouth Daily., Disp: , Rfl:     metFORMIN (GLUCOPHAGE) 500 MG tablet, Take 1 tablet by mouth Daily With Breakfast., Disp: , Rfl:     montelukast (SINGULAIR) 10 MG tablet, Take 1 tablet by mouth Daily., Disp: , Rfl:     Multiple Vitamins-Minerals (MULTIVITAMIN WITH MINERALS) tablet tablet, Take 1 tablet by mouth Daily., Disp: , Rfl:     oxybutynin XL (DITROPAN-XL) 5 MG 24 hr tablet, Take 1 tablet by mouth Daily., Disp: , Rfl:     rosuvastatin (CRESTOR) 10 MG tablet, Take 1 tablet by mouth Daily., Disp: , Rfl:     triamterene-hydrochlorothiazide (DYAZIDE) 37.5-25 MG per capsule, Take 1 capsule by mouth Daily., Disp: , Rfl:     ursodiol (ACTIGALL) 250 MG tablet, Take 1 tablet by mouth Daily., Disp: , Rfl: 5    vitamin D3 125 MCG (5000 UT) capsule capsule, Take 1 capsule by mouth Daily., Disp: , Rfl:     vitamin E 400 UNIT capsule, Take 1 capsule by mouth Daily., Disp: , Rfl:     Current Facility-Administered Medications:     lidocaine (XYLOCAINE) 1 % injection 10 mL, 10 mL, Subcutaneous, Once, Pham Santiago MD    lidocaine 1% - EPINEPHrine 1:921016 (XYLOCAINE W/EPI) 1 %-1:598289 injection 10 mL, 10 mL, Injection, Once, Pham Santiago MD      Objective     Vital Signs   /74   Pulse 90   Ht 157.5 cm (62\")   Wt 68.5 kg (151 lb)   LMP  (LMP Unknown)   SpO2 98%   BMI 27.62 kg/m²      Physical Exam:  General appearance - alert, well appearing, and in no distress  Mental status - alert, oriented to person, place, and time  Eyes - pupils equal and reactive, extraocular eye movements intact  Neck - supple, no significant adenopathy  Chest - no tachypnea, retractions or cyanosis  Heart - normal rate and regular rhythm  Abdomen - soft, nontender, nondistended, no masses or organomegaly  Neurological - alert, oriented, normal speech, no focal findings or movement disorder noted  Breast Exam: Bilateral nipples everted. Patient examined in the supine position with the ipsilateral arm " above the head. In the upper outer quadrant of the right breast there is a deep indentation at her prior incision. No other palpable masses bilaterally. No nipple discharge bilaterally. No palpable axillary nor supraclavicular adenopathy bilaterally.   Physical Exam         Results Review:     The following data was reviewed by: Lima Swenson MD on 11/11/2024:    Mammo Diagnostic Digital Tomosynthesis Bilateral With CAD (11/04/2024 13:00)   US Breast Right Limited (11/04/2024 13:31)   Results         Assessment & Plan       Diagnoses and all orders for this visit:    1. Personal history of breast cancer (Primary)  -     MRI Breast Bilateral Screening With & Without Contrast; Future    2. Abnormality of right breast on screening mammogram  -     MRI Breast Bilateral Screening With & Without Contrast; Future    3. Overweight with body mass index (BMI) of 28 to 28.9 in adult       Assessment & Plan  1. Personal history of breast cancer with abnormal MRI:   There are no current concerns about her breasts. However, a follow-up MRI is recommended in 6 months to monitor a spot identified in the previous imaging. The MRI will be shorter as it is a screening one.     2. Foot surgery.  She is scheduled to undergo foot surgery on Thursday. No additional treatment or medication is discussed for this condition during the visit.    Follow-up  Return in 6 months for MRI.     Lima Swenson MD  11/11/24  11:10 CST    Patient or patient representative verbalized consent for the use of Ambient Listening during the visit with  Lima Swenson MD for chart documentation. 11/12/2024  16:06 CST             No pertinent past medical history

## 2025-06-27 ENCOUNTER — TELEPHONE (OUTPATIENT)
Dept: SURGERY | Facility: CLINIC | Age: 85
End: 2025-06-27

## 2025-06-27 NOTE — TELEPHONE ENCOUNTER
Patient cancelled her Imaging and Apt called to see if we could do anything or help get her rescheduled. No answer so left patient a voicemail to call back.

## 2025-07-29 NOTE — TELEPHONE ENCOUNTER
Tried to call patient multiple times. No answer, no return. Per scheduling notes patient cancelled MRI because she wants to discuss this further before having done and will contact office to schedule.

## (undated) DEVICE — DRSNG SURESITE WNDW 4X4.5

## (undated) DEVICE — ADHS LIQ MASTISOL 2/3ML

## (undated) DEVICE — PAD MINOR UNIVERSAL: Brand: MEDLINE INDUSTRIES, INC.

## (undated) DEVICE — ANTIBACTERIAL UNDYED BRAIDED (POLYGLACTIN 910), SYNTHETIC ABSORBABLE SUTURE: Brand: COATED VICRYL

## (undated) DEVICE — 3M™ STERI-STRIP™ REINFORCED ADHESIVE SKIN CLOSURES, R1546, 1/4 IN X 4 IN (6 MM X 100 MM), 10 STRIPS/ENVELOPE: Brand: 3M™ STERI-STRIP™

## (undated) DEVICE — GLV SURG BIOGEL M LTX PF 7 1/2

## (undated) DEVICE — PK TURNOVER RM ADV

## (undated) DEVICE — APPL CHLORAPREP HI/LITE 26ML ORNG

## (undated) DEVICE — SPNG GZ STRL 2S 4X4 12PLY